# Patient Record
Sex: MALE | Race: BLACK OR AFRICAN AMERICAN | Employment: OTHER | URBAN - METROPOLITAN AREA
[De-identification: names, ages, dates, MRNs, and addresses within clinical notes are randomized per-mention and may not be internally consistent; named-entity substitution may affect disease eponyms.]

---

## 2017-02-08 ENCOUNTER — OFFICE VISIT (OUTPATIENT)
Dept: INTERNAL MEDICINE | Age: 82
End: 2017-02-08

## 2017-02-08 ENCOUNTER — TELEPHONE (OUTPATIENT)
Dept: INTERNAL MEDICINE | Age: 82
End: 2017-02-08

## 2017-02-08 VITALS
HEIGHT: 68 IN | HEART RATE: 66 BPM | SYSTOLIC BLOOD PRESSURE: 139 MMHG | BODY MASS INDEX: 47.44 KG/M2 | DIASTOLIC BLOOD PRESSURE: 75 MMHG | OXYGEN SATURATION: 90 % | WEIGHT: 313 LBS

## 2017-02-08 DIAGNOSIS — E11.22 TYPE 2 DIABETES MELLITUS WITH STAGE 3 CHRONIC KIDNEY DISEASE, WITHOUT LONG-TERM CURRENT USE OF INSULIN (HCC): Chronic | ICD-10-CM

## 2017-02-08 DIAGNOSIS — D64.9 ANEMIA, UNSPECIFIED TYPE: Chronic | ICD-10-CM

## 2017-02-08 DIAGNOSIS — M1A.9XX0 CHRONIC GOUT WITHOUT TOPHUS, UNSPECIFIED CAUSE, UNSPECIFIED SITE: Chronic | ICD-10-CM

## 2017-02-08 DIAGNOSIS — E66.01 MORBID OBESITY WITH BMI OF 45.0-49.9, ADULT (HCC): ICD-10-CM

## 2017-02-08 DIAGNOSIS — N18.30 CHRONIC KIDNEY DISEASE, STAGE III (MODERATE) (HCC): ICD-10-CM

## 2017-02-08 DIAGNOSIS — N18.30 TYPE 2 DIABETES MELLITUS WITH STAGE 3 CHRONIC KIDNEY DISEASE, WITHOUT LONG-TERM CURRENT USE OF INSULIN (HCC): Chronic | ICD-10-CM

## 2017-02-08 DIAGNOSIS — R80.9 PROTEINURIA: Chronic | ICD-10-CM

## 2017-02-08 DIAGNOSIS — R06.09 DYSPNEA ON EXERTION: Chronic | ICD-10-CM

## 2017-02-08 DIAGNOSIS — I10 ESSENTIAL HYPERTENSION: Chronic | ICD-10-CM

## 2017-02-08 DIAGNOSIS — E78.5 HYPERLIPIDEMIA, UNSPECIFIED HYPERLIPIDEMIA TYPE: Primary | Chronic | ICD-10-CM

## 2017-02-08 DIAGNOSIS — J43.9 PULMONARY EMPHYSEMA, UNSPECIFIED EMPHYSEMA TYPE (HCC): ICD-10-CM

## 2017-02-08 DIAGNOSIS — I51.89 DIASTOLIC DYSFUNCTION: Chronic | ICD-10-CM

## 2017-02-08 LAB
ALBUMIN SERPL-MCNC: 4.8 G/DL (ref 3.4–5)
ALP BLD-CCNC: 63 U/L (ref 40–129)
ALT SERPL-CCNC: 9 U/L (ref 10–40)
ANION GAP SERPL CALCULATED.3IONS-SCNC: 16 MMOL/L (ref 3–16)
AST SERPL-CCNC: 17 U/L (ref 15–37)
BASOPHILS ABSOLUTE: 0 K/UL (ref 0–0.2)
BASOPHILS RELATIVE PERCENT: 0.6 %
BILIRUB SERPL-MCNC: 0.6 MG/DL (ref 0–1)
BILIRUBIN DIRECT: <0.2 MG/DL (ref 0–0.3)
BILIRUBIN URINE: NEGATIVE
BILIRUBIN, INDIRECT: ABNORMAL MG/DL (ref 0–1)
BLOOD, URINE: NEGATIVE
BUN BLDV-MCNC: 16 MG/DL (ref 7–20)
CALCIUM SERPL-MCNC: 10.6 MG/DL (ref 8.3–10.6)
CHLORIDE BLD-SCNC: 101 MMOL/L (ref 99–110)
CLARITY: CLEAR
CO2: 29 MMOL/L (ref 21–32)
COLOR: YELLOW
CREAT SERPL-MCNC: 1.5 MG/DL (ref 0.8–1.3)
CREATININE URINE: 134.2 MG/DL (ref 39–259)
EOSINOPHILS ABSOLUTE: 0.4 K/UL (ref 0–0.6)
EOSINOPHILS RELATIVE PERCENT: 7.1 %
EPITHELIAL CELLS, UA: 3 /HPF (ref 0–5)
GFR AFRICAN AMERICAN: 54
GFR NON-AFRICAN AMERICAN: 45
GLUCOSE BLD-MCNC: 111 MG/DL (ref 70–99)
GLUCOSE URINE: NEGATIVE MG/DL
HCT VFR BLD CALC: 45.3 % (ref 40.5–52.5)
HEMOGLOBIN: 14.2 G/DL (ref 13.5–17.5)
HYALINE CASTS: 3 /HPF (ref 0–8)
KETONES, URINE: NEGATIVE MG/DL
LEUKOCYTE ESTERASE, URINE: NEGATIVE
LYMPHOCYTES ABSOLUTE: 1.5 K/UL (ref 1–5.1)
LYMPHOCYTES RELATIVE PERCENT: 29.5 %
MCH RBC QN AUTO: 30.8 PG (ref 26–34)
MCHC RBC AUTO-ENTMCNC: 31.5 G/DL (ref 31–36)
MCV RBC AUTO: 97.8 FL (ref 80–100)
MICROALBUMIN UR-MCNC: 79.1 MG/DL
MICROALBUMIN/CREAT UR-RTO: 589.4 MG/G (ref 0–30)
MICROSCOPIC EXAMINATION: YES
MONOCYTES ABSOLUTE: 0.5 K/UL (ref 0–1.3)
MONOCYTES RELATIVE PERCENT: 8.7 %
NEUTROPHILS ABSOLUTE: 2.8 K/UL (ref 1.7–7.7)
NEUTROPHILS RELATIVE PERCENT: 54.1 %
NITRITE, URINE: NEGATIVE
PDW BLD-RTO: 16.1 % (ref 12.4–15.4)
PH UA: 7
PHOSPHORUS: 2.8 MG/DL (ref 2.5–4.9)
PLATELET # BLD: 141 K/UL (ref 135–450)
PMV BLD AUTO: 10 FL (ref 5–10.5)
POTASSIUM SERPL-SCNC: 4.4 MMOL/L (ref 3.5–5.1)
PROTEIN UA: 100 MG/DL
RBC # BLD: 4.63 M/UL (ref 4.2–5.9)
RBC UA: 5 /HPF (ref 0–4)
SODIUM BLD-SCNC: 146 MMOL/L (ref 136–145)
SPECIFIC GRAVITY UA: 1.02
TOTAL PROTEIN: 7.8 G/DL (ref 6.4–8.2)
TSH SERPL DL<=0.05 MIU/L-ACNC: 4.36 UIU/ML (ref 0.27–4.2)
URIC ACID, SERUM: 5.9 MG/DL (ref 3.5–7.2)
URINE TYPE: ABNORMAL
UROBILINOGEN, URINE: 1 E.U./DL
WBC # BLD: 5.2 K/UL (ref 4–11)
WBC UA: 3 /HPF (ref 0–5)

## 2017-02-08 PROCEDURE — 99214 OFFICE O/P EST MOD 30 MIN: CPT | Performed by: INTERNAL MEDICINE

## 2017-02-08 RX ORDER — BUDESONIDE AND FORMOTEROL FUMARATE DIHYDRATE 80; 4.5 UG/1; UG/1
2 AEROSOL RESPIRATORY (INHALATION) 2 TIMES DAILY
Qty: 1 INHALER | Refills: 12 | Status: SHIPPED | OUTPATIENT
Start: 2017-02-08 | End: 2017-05-09

## 2017-02-08 ASSESSMENT — ENCOUNTER SYMPTOMS
APNEA: 0
WHEEZING: 0
STRIDOR: 0
EYES NEGATIVE: 1
SHORTNESS OF BREATH: 1
COUGH: 0
GASTROINTESTINAL NEGATIVE: 1
CHOKING: 0
CHEST TIGHTNESS: 0

## 2017-02-09 LAB
ESTIMATED AVERAGE GLUCOSE: 134.1 MG/DL
HBA1C MFR BLD: 6.3 %

## 2017-02-14 RX ORDER — LEVOTHYROXINE SODIUM 0.03 MG/1
25 TABLET ORAL DAILY
Qty: 30 TABLET | Refills: 2 | Status: SHIPPED | OUTPATIENT
Start: 2017-02-14 | End: 2017-05-09 | Stop reason: SDUPTHER

## 2017-05-09 ENCOUNTER — OFFICE VISIT (OUTPATIENT)
Dept: INTERNAL MEDICINE | Age: 82
End: 2017-05-09

## 2017-05-09 VITALS
OXYGEN SATURATION: 93 % | WEIGHT: 315 LBS | HEART RATE: 76 BPM | SYSTOLIC BLOOD PRESSURE: 154 MMHG | DIASTOLIC BLOOD PRESSURE: 86 MMHG | BODY MASS INDEX: 48.96 KG/M2

## 2017-05-09 DIAGNOSIS — N18.30 TYPE 2 DIABETES MELLITUS WITH STAGE 3 CHRONIC KIDNEY DISEASE, WITHOUT LONG-TERM CURRENT USE OF INSULIN (HCC): Chronic | ICD-10-CM

## 2017-05-09 DIAGNOSIS — E04.2 MULTIPLE THYROID NODULES: Chronic | ICD-10-CM

## 2017-05-09 DIAGNOSIS — E11.22 TYPE 2 DIABETES MELLITUS WITH STAGE 3 CHRONIC KIDNEY DISEASE, WITHOUT LONG-TERM CURRENT USE OF INSULIN (HCC): Chronic | ICD-10-CM

## 2017-05-09 DIAGNOSIS — I10 ESSENTIAL HYPERTENSION: Primary | Chronic | ICD-10-CM

## 2017-05-09 DIAGNOSIS — R60.0 LOCALIZED EDEMA: Chronic | ICD-10-CM

## 2017-05-09 DIAGNOSIS — N18.30 CHRONIC KIDNEY DISEASE, STAGE III (MODERATE) (HCC): Chronic | ICD-10-CM

## 2017-05-09 DIAGNOSIS — J43.9 PULMONARY EMPHYSEMA, UNSPECIFIED EMPHYSEMA TYPE (HCC): ICD-10-CM

## 2017-05-09 PROCEDURE — 99214 OFFICE O/P EST MOD 30 MIN: CPT | Performed by: INTERNAL MEDICINE

## 2017-05-09 RX ORDER — TORSEMIDE 100 MG/1
100 TABLET ORAL DAILY
Qty: 90 TABLET | Refills: 3 | Status: SHIPPED | OUTPATIENT
Start: 2017-05-09 | End: 2017-07-06 | Stop reason: SDUPTHER

## 2017-05-09 RX ORDER — LISINOPRIL 40 MG/1
40 TABLET ORAL DAILY
Qty: 90 TABLET | Refills: 3 | Status: ON HOLD | OUTPATIENT
Start: 2017-05-09 | End: 2017-08-13 | Stop reason: HOSPADM

## 2017-05-09 RX ORDER — CARVEDILOL 25 MG/1
25 TABLET ORAL 2 TIMES DAILY
Qty: 180 TABLET | Refills: 3 | Status: SHIPPED | OUTPATIENT
Start: 2017-05-09 | End: 2017-11-01 | Stop reason: SDUPTHER

## 2017-05-09 RX ORDER — LEVOTHYROXINE SODIUM 0.03 MG/1
25 TABLET ORAL DAILY
Qty: 90 TABLET | Refills: 3 | Status: SHIPPED | OUTPATIENT
Start: 2017-05-09 | End: 2017-11-01 | Stop reason: SDUPTHER

## 2017-05-09 ASSESSMENT — ENCOUNTER SYMPTOMS
APNEA: 0
EYES NEGATIVE: 1
CHEST TIGHTNESS: 0
CHOKING: 0
SHORTNESS OF BREATH: 1
COUGH: 0
BLURRED VISION: 0
STRIDOR: 0
GASTROINTESTINAL NEGATIVE: 1
WHEEZING: 0

## 2017-05-09 ASSESSMENT — PATIENT HEALTH QUESTIONNAIRE - PHQ9
SUM OF ALL RESPONSES TO PHQ QUESTIONS 1-9: 0
2. FEELING DOWN, DEPRESSED OR HOPELESS: 0
SUM OF ALL RESPONSES TO PHQ9 QUESTIONS 1 & 2: 0
1. LITTLE INTEREST OR PLEASURE IN DOING THINGS: 0

## 2017-05-10 LAB
ALBUMIN SERPL-MCNC: 4.5 G/DL (ref 3.4–5)
ANION GAP SERPL CALCULATED.3IONS-SCNC: 15 MMOL/L (ref 3–16)
BUN BLDV-MCNC: 13 MG/DL (ref 7–20)
CALCIUM SERPL-MCNC: 10.3 MG/DL (ref 8.3–10.6)
CHLORIDE BLD-SCNC: 101 MMOL/L (ref 99–110)
CO2: 29 MMOL/L (ref 21–32)
CREAT SERPL-MCNC: 1.4 MG/DL (ref 0.8–1.3)
ESTIMATED AVERAGE GLUCOSE: 139.9 MG/DL
GFR AFRICAN AMERICAN: 59
GFR NON-AFRICAN AMERICAN: 48
GLUCOSE BLD-MCNC: 111 MG/DL (ref 70–99)
HBA1C MFR BLD: 6.5 %
PHOSPHORUS: 2.6 MG/DL (ref 2.5–4.9)
POTASSIUM SERPL-SCNC: 4.5 MMOL/L (ref 3.5–5.1)
SODIUM BLD-SCNC: 145 MMOL/L (ref 136–145)

## 2017-06-06 ENCOUNTER — OFFICE VISIT (OUTPATIENT)
Dept: INTERNAL MEDICINE | Age: 82
End: 2017-06-06

## 2017-06-06 VITALS
BODY MASS INDEX: 47.44 KG/M2 | SYSTOLIC BLOOD PRESSURE: 130 MMHG | WEIGHT: 313 LBS | HEIGHT: 68 IN | OXYGEN SATURATION: 93 % | DIASTOLIC BLOOD PRESSURE: 74 MMHG | HEART RATE: 63 BPM

## 2017-06-06 DIAGNOSIS — R60.0 LOCALIZED EDEMA: Primary | Chronic | ICD-10-CM

## 2017-06-06 DIAGNOSIS — N18.30 CHRONIC KIDNEY DISEASE, STAGE III (MODERATE) (HCC): Chronic | ICD-10-CM

## 2017-06-06 DIAGNOSIS — L03.119 CELLULITIS OF LOWER EXTREMITY, UNSPECIFIED LATERALITY: ICD-10-CM

## 2017-06-06 PROCEDURE — 99214 OFFICE O/P EST MOD 30 MIN: CPT | Performed by: INTERNAL MEDICINE

## 2017-06-06 RX ORDER — CIPROFLOXACIN 250 MG/1
250 TABLET, FILM COATED ORAL 2 TIMES DAILY
Qty: 20 TABLET | Refills: 0 | Status: SHIPPED | OUTPATIENT
Start: 2017-06-06 | End: 2017-07-06 | Stop reason: ALTCHOICE

## 2017-06-06 ASSESSMENT — ENCOUNTER SYMPTOMS
RESPIRATORY NEGATIVE: 1
GASTROINTESTINAL NEGATIVE: 1
EYES NEGATIVE: 1

## 2017-06-07 LAB
ALBUMIN SERPL-MCNC: 4.5 G/DL (ref 3.4–5)
ANION GAP SERPL CALCULATED.3IONS-SCNC: 15 MMOL/L (ref 3–16)
BUN BLDV-MCNC: 21 MG/DL (ref 7–20)
CALCIUM SERPL-MCNC: 10.4 MG/DL (ref 8.3–10.6)
CHLORIDE BLD-SCNC: 97 MMOL/L (ref 99–110)
CO2: 31 MMOL/L (ref 21–32)
CREAT SERPL-MCNC: 1.8 MG/DL (ref 0.8–1.3)
GFR AFRICAN AMERICAN: 44
GFR NON-AFRICAN AMERICAN: 36
GLUCOSE BLD-MCNC: 94 MG/DL (ref 70–99)
PHOSPHORUS: 3 MG/DL (ref 2.5–4.9)
POTASSIUM SERPL-SCNC: 4.3 MMOL/L (ref 3.5–5.1)
SODIUM BLD-SCNC: 143 MMOL/L (ref 136–145)

## 2017-06-11 ASSESSMENT — ENCOUNTER SYMPTOMS: BLURRED VISION: 0

## 2017-07-06 ENCOUNTER — OFFICE VISIT (OUTPATIENT)
Dept: INTERNAL MEDICINE | Age: 82
End: 2017-07-06

## 2017-07-06 VITALS
SYSTOLIC BLOOD PRESSURE: 136 MMHG | DIASTOLIC BLOOD PRESSURE: 67 MMHG | WEIGHT: 312 LBS | HEIGHT: 68 IN | HEART RATE: 66 BPM | OXYGEN SATURATION: 93 % | BODY MASS INDEX: 47.29 KG/M2

## 2017-07-06 DIAGNOSIS — N18.30 TYPE 2 DIABETES MELLITUS WITH STAGE 3 CHRONIC KIDNEY DISEASE, WITHOUT LONG-TERM CURRENT USE OF INSULIN (HCC): Primary | Chronic | ICD-10-CM

## 2017-07-06 DIAGNOSIS — N18.30 CHRONIC KIDNEY DISEASE, STAGE III (MODERATE) (HCC): Chronic | ICD-10-CM

## 2017-07-06 DIAGNOSIS — R60.0 LOCALIZED EDEMA: Chronic | ICD-10-CM

## 2017-07-06 DIAGNOSIS — E11.22 TYPE 2 DIABETES MELLITUS WITH STAGE 3 CHRONIC KIDNEY DISEASE, WITHOUT LONG-TERM CURRENT USE OF INSULIN (HCC): Primary | Chronic | ICD-10-CM

## 2017-07-06 DIAGNOSIS — J43.9 PULMONARY EMPHYSEMA, UNSPECIFIED EMPHYSEMA TYPE (HCC): ICD-10-CM

## 2017-07-06 PROCEDURE — 99214 OFFICE O/P EST MOD 30 MIN: CPT | Performed by: INTERNAL MEDICINE

## 2017-07-06 RX ORDER — ALBUTEROL SULFATE 90 UG/1
2 AEROSOL, METERED RESPIRATORY (INHALATION) EVERY 6 HOURS PRN
Qty: 1 INHALER | Refills: 12 | Status: SHIPPED | OUTPATIENT
Start: 2017-07-06 | End: 2022-07-11 | Stop reason: CLARIF

## 2017-07-06 RX ORDER — TORSEMIDE 100 MG/1
100 TABLET ORAL 2 TIMES DAILY
Qty: 180 TABLET | Refills: 3 | Status: ON HOLD | OUTPATIENT
Start: 2017-07-06 | End: 2017-08-13

## 2017-07-06 RX ORDER — CEPHALEXIN 500 MG/1
500 CAPSULE ORAL 3 TIMES DAILY
Qty: 30 CAPSULE | Refills: 0 | Status: ON HOLD | OUTPATIENT
Start: 2017-07-06 | End: 2017-08-08 | Stop reason: CLARIF

## 2017-07-07 ENCOUNTER — TELEPHONE (OUTPATIENT)
Dept: INTERNAL MEDICINE | Age: 82
End: 2017-07-07

## 2017-07-10 ASSESSMENT — ENCOUNTER SYMPTOMS
APNEA: 0
EYES NEGATIVE: 1
COUGH: 0
WHEEZING: 0
CHEST TIGHTNESS: 0
SHORTNESS OF BREATH: 1
GASTROINTESTINAL NEGATIVE: 1
STRIDOR: 0
BLURRED VISION: 0
CHOKING: 0

## 2017-07-27 ENCOUNTER — TELEPHONE (OUTPATIENT)
Dept: INTERNAL MEDICINE | Age: 82
End: 2017-07-27

## 2017-08-08 PROBLEM — R60.1 ANASARCA: Status: ACTIVE | Noted: 2017-08-08

## 2017-08-09 PROBLEM — I87.2 VENOUS STASIS DERMATITIS OF BOTH LOWER EXTREMITIES: Status: ACTIVE | Noted: 2017-08-09

## 2017-08-14 ENCOUNTER — TELEPHONE (OUTPATIENT)
Dept: PHARMACY | Facility: CLINIC | Age: 82
End: 2017-08-14

## 2017-08-14 ENCOUNTER — CARE COORDINATION (OUTPATIENT)
Dept: CASE MANAGEMENT | Age: 82
End: 2017-08-14

## 2017-08-15 ENCOUNTER — CARE COORDINATION (OUTPATIENT)
Dept: CASE MANAGEMENT | Age: 82
End: 2017-08-15

## 2017-08-17 ENCOUNTER — TELEPHONE (OUTPATIENT)
Dept: INTERNAL MEDICINE | Age: 82
End: 2017-08-17

## 2017-08-18 ENCOUNTER — CARE COORDINATION (OUTPATIENT)
Dept: CASE MANAGEMENT | Age: 82
End: 2017-08-18

## 2017-08-21 ENCOUNTER — OFFICE VISIT (OUTPATIENT)
Dept: INTERNAL MEDICINE | Age: 82
End: 2017-08-21

## 2017-08-21 VITALS
HEART RATE: 78 BPM | OXYGEN SATURATION: 98 % | BODY MASS INDEX: 44.57 KG/M2 | HEIGHT: 67 IN | WEIGHT: 284 LBS | SYSTOLIC BLOOD PRESSURE: 120 MMHG | DIASTOLIC BLOOD PRESSURE: 66 MMHG

## 2017-08-21 DIAGNOSIS — R76.8 ANTINEUTROPHIL CYTOPLASMIC ANTIBODY (ANCA) POSITIVE: Chronic | ICD-10-CM

## 2017-08-21 DIAGNOSIS — E11.69 DIABETES MELLITUS TYPE 2 IN OBESE (HCC): Chronic | ICD-10-CM

## 2017-08-21 DIAGNOSIS — N18.30 CHRONIC KIDNEY DISEASE, STAGE III (MODERATE) (HCC): Chronic | ICD-10-CM

## 2017-08-21 DIAGNOSIS — D64.9 ANEMIA, UNSPECIFIED TYPE: Chronic | ICD-10-CM

## 2017-08-21 DIAGNOSIS — N18.30 ACUTE RENAL FAILURE SUPERIMPOSED ON STAGE 3 CHRONIC KIDNEY DISEASE (HCC): ICD-10-CM

## 2017-08-21 DIAGNOSIS — I87.2 VENOUS STASIS DERMATITIS OF BOTH LOWER EXTREMITIES: ICD-10-CM

## 2017-08-21 DIAGNOSIS — J43.9 PULMONARY EMPHYSEMA, UNSPECIFIED EMPHYSEMA TYPE (HCC): Chronic | ICD-10-CM

## 2017-08-21 DIAGNOSIS — N17.9 ACUTE RENAL FAILURE SUPERIMPOSED ON STAGE 3 CHRONIC KIDNEY DISEASE (HCC): ICD-10-CM

## 2017-08-21 DIAGNOSIS — E78.5 HYPERLIPIDEMIA, UNSPECIFIED HYPERLIPIDEMIA TYPE: Chronic | ICD-10-CM

## 2017-08-21 DIAGNOSIS — R60.1 ANASARCA: Primary | ICD-10-CM

## 2017-08-21 DIAGNOSIS — E55.9 VITAMIN D DEFICIENCY: Chronic | ICD-10-CM

## 2017-08-21 DIAGNOSIS — N18.4 CHRONIC KIDNEY DISEASE (CKD), STAGE IV (SEVERE) (HCC): ICD-10-CM

## 2017-08-21 DIAGNOSIS — I10 ESSENTIAL HYPERTENSION: Chronic | ICD-10-CM

## 2017-08-21 DIAGNOSIS — I50.33 ACUTE ON CHRONIC DIASTOLIC HEART FAILURE (HCC): ICD-10-CM

## 2017-08-21 DIAGNOSIS — Z23 NEED FOR INFLUENZA VACCINATION: ICD-10-CM

## 2017-08-21 DIAGNOSIS — M1A.9XX0 CHRONIC GOUT WITHOUT TOPHUS, UNSPECIFIED CAUSE, UNSPECIFIED SITE: Chronic | ICD-10-CM

## 2017-08-21 DIAGNOSIS — E66.9 DIABETES MELLITUS TYPE 2 IN OBESE (HCC): Chronic | ICD-10-CM

## 2017-08-21 LAB
ALBUMIN SERPL-MCNC: 5 G/DL (ref 3.4–5)
ALP BLD-CCNC: 64 U/L (ref 40–129)
ALT SERPL-CCNC: 11 U/L (ref 10–40)
ANION GAP SERPL CALCULATED.3IONS-SCNC: 24 MMOL/L (ref 3–16)
AST SERPL-CCNC: 15 U/L (ref 15–37)
BASOPHILS ABSOLUTE: 0 K/UL (ref 0–0.2)
BASOPHILS RELATIVE PERCENT: 0.5 %
BILIRUB SERPL-MCNC: 0.6 MG/DL (ref 0–1)
BILIRUBIN DIRECT: <0.2 MG/DL (ref 0–0.3)
BILIRUBIN URINE: NEGATIVE
BILIRUBIN, INDIRECT: ABNORMAL MG/DL (ref 0–1)
BLOOD, URINE: ABNORMAL
BUN BLDV-MCNC: 65 MG/DL (ref 7–20)
CALCIUM SERPL-MCNC: 11.2 MG/DL (ref 8.3–10.6)
CHLORIDE BLD-SCNC: 93 MMOL/L (ref 99–110)
CHOLESTEROL, TOTAL: 149 MG/DL (ref 0–199)
CLARITY: CLEAR
CO2: 25 MMOL/L (ref 21–32)
COLOR: YELLOW
CREAT SERPL-MCNC: 2.9 MG/DL (ref 0.8–1.3)
CREATININE URINE: 108.1 MG/DL (ref 39–259)
EOSINOPHILS ABSOLUTE: 0.3 K/UL (ref 0–0.6)
EOSINOPHILS RELATIVE PERCENT: 3.5 %
EPITHELIAL CELLS, UA: 3 /HPF (ref 0–5)
GFR AFRICAN AMERICAN: 25
GFR NON-AFRICAN AMERICAN: 21
GLUCOSE BLD-MCNC: 121 MG/DL (ref 70–99)
GLUCOSE URINE: NEGATIVE MG/DL
HCT VFR BLD CALC: 45.4 % (ref 40.5–52.5)
HDLC SERPL-MCNC: 42 MG/DL (ref 40–60)
HEMOGLOBIN: 14.7 G/DL (ref 13.5–17.5)
HYALINE CASTS: 1 /LPF (ref 0–8)
KETONES, URINE: NEGATIVE MG/DL
LDL CHOLESTEROL CALCULATED: 89 MG/DL
LEUKOCYTE ESTERASE, URINE: ABNORMAL
LYMPHOCYTES ABSOLUTE: 2 K/UL (ref 1–5.1)
LYMPHOCYTES RELATIVE PERCENT: 27.3 %
MCH RBC QN AUTO: 30.8 PG (ref 26–34)
MCHC RBC AUTO-ENTMCNC: 32.5 G/DL (ref 31–36)
MCV RBC AUTO: 94.6 FL (ref 80–100)
MICROALBUMIN UR-MCNC: 7.8 MG/DL
MICROALBUMIN/CREAT UR-RTO: 72.2 MG/G (ref 0–30)
MICROSCOPIC EXAMINATION: YES
MONOCYTES ABSOLUTE: 0.7 K/UL (ref 0–1.3)
MONOCYTES RELATIVE PERCENT: 10.3 %
NEUTROPHILS ABSOLUTE: 4.2 K/UL (ref 1.7–7.7)
NEUTROPHILS RELATIVE PERCENT: 58.4 %
NITRITE, URINE: NEGATIVE
PDW BLD-RTO: 15.1 % (ref 12.4–15.4)
PH UA: 6
PHOSPHORUS: 2.9 MG/DL (ref 2.5–4.9)
PLATELET # BLD: 177 K/UL (ref 135–450)
PMV BLD AUTO: 11.1 FL (ref 5–10.5)
POTASSIUM SERPL-SCNC: 4.4 MMOL/L (ref 3.5–5.1)
PROTEIN UA: ABNORMAL MG/DL
RBC # BLD: 4.79 M/UL (ref 4.2–5.9)
RBC UA: 4 /HPF (ref 0–4)
SODIUM BLD-SCNC: 142 MMOL/L (ref 136–145)
SPECIFIC GRAVITY UA: 1.01
TOTAL PROTEIN: 8.7 G/DL (ref 6.4–8.2)
TRIGL SERPL-MCNC: 91 MG/DL (ref 0–150)
URIC ACID, SERUM: 6.6 MG/DL (ref 3.5–7.2)
URINE TYPE: ABNORMAL
UROBILINOGEN, URINE: 0.2 E.U./DL
VLDLC SERPL CALC-MCNC: 18 MG/DL
WBC # BLD: 7.2 K/UL (ref 4–11)
WBC UA: 18 /HPF (ref 0–5)

## 2017-08-21 PROCEDURE — 99495 TRANSJ CARE MGMT MOD F2F 14D: CPT | Performed by: INTERNAL MEDICINE

## 2017-08-21 PROCEDURE — 90662 IIV NO PRSV INCREASED AG IM: CPT | Performed by: INTERNAL MEDICINE

## 2017-08-21 PROCEDURE — G0008 ADMIN INFLUENZA VIRUS VAC: HCPCS | Performed by: INTERNAL MEDICINE

## 2017-08-21 ASSESSMENT — ENCOUNTER SYMPTOMS
RESPIRATORY NEGATIVE: 1
EYES NEGATIVE: 1
GASTROINTESTINAL NEGATIVE: 1
FACIAL SWELLING: 0
VOICE CHANGE: 0
SORE THROAT: 0
RHINORRHEA: 0
TROUBLE SWALLOWING: 0
SINUS PRESSURE: 0

## 2017-08-22 LAB
ESTIMATED AVERAGE GLUCOSE: 165.7 MG/DL
HBA1C MFR BLD: 7.4 %
TSH SERPL DL<=0.05 MIU/L-ACNC: 4.18 UIU/ML (ref 0.27–4.2)
VITAMIN D 25-HYDROXY: 28.1 NG/ML

## 2017-08-23 ENCOUNTER — CARE COORDINATION (OUTPATIENT)
Dept: CASE MANAGEMENT | Age: 82
End: 2017-08-23

## 2017-08-24 PROBLEM — E83.52 HYPERCALCEMIA: Chronic | Status: ACTIVE | Noted: 2017-08-24

## 2017-08-24 RX ORDER — LEVOFLOXACIN 250 MG/1
250 TABLET ORAL DAILY
Qty: 3 TABLET | Refills: 0 | OUTPATIENT
Start: 2017-08-24 | End: 2017-08-27

## 2017-08-29 ENCOUNTER — CARE COORDINATION (OUTPATIENT)
Dept: CASE MANAGEMENT | Age: 82
End: 2017-08-29

## 2017-10-12 ENCOUNTER — OFFICE VISIT (OUTPATIENT)
Dept: INTERNAL MEDICINE | Age: 82
End: 2017-10-12

## 2017-10-12 VITALS
OXYGEN SATURATION: 98 % | HEART RATE: 60 BPM | WEIGHT: 275 LBS | DIASTOLIC BLOOD PRESSURE: 74 MMHG | BODY MASS INDEX: 44.2 KG/M2 | SYSTOLIC BLOOD PRESSURE: 150 MMHG | HEIGHT: 66 IN

## 2017-10-12 DIAGNOSIS — E78.5 HYPERLIPIDEMIA, UNSPECIFIED HYPERLIPIDEMIA TYPE: Chronic | ICD-10-CM

## 2017-10-12 DIAGNOSIS — E11.69 DIABETES MELLITUS TYPE 2 IN OBESE (HCC): Chronic | ICD-10-CM

## 2017-10-12 DIAGNOSIS — I51.89 DIASTOLIC DYSFUNCTION: Chronic | ICD-10-CM

## 2017-10-12 DIAGNOSIS — I10 ESSENTIAL HYPERTENSION: Chronic | ICD-10-CM

## 2017-10-12 DIAGNOSIS — N18.30 CHRONIC KIDNEY DISEASE, STAGE III (MODERATE) (HCC): Primary | Chronic | ICD-10-CM

## 2017-10-12 DIAGNOSIS — E66.9 DIABETES MELLITUS TYPE 2 IN OBESE (HCC): Chronic | ICD-10-CM

## 2017-10-12 PROCEDURE — 99213 OFFICE O/P EST LOW 20 MIN: CPT | Performed by: INTERNAL MEDICINE

## 2017-10-12 ASSESSMENT — ENCOUNTER SYMPTOMS
VOMITING: 0
ABDOMINAL PAIN: 0
CHEST TIGHTNESS: 0
SHORTNESS OF BREATH: 0
NAUSEA: 0

## 2017-10-12 NOTE — PROGRESS NOTES
MD   aspirin EC 81 MG EC tablet Take 1 tablet by mouth daily. With food. 2/6/15  Yes Santa Becerra MD   Multiple Vitamin (MULTIVITAMIN PO) Take 1 tablet by mouth daily. 5/24/10  Yes Santa Becerra MD       REVIEW OF SYSTEMS:  Review of Systems   Constitutional: Negative for chills and fever. Respiratory: Negative for chest tightness and shortness of breath. Cardiovascular: Negative for chest pain. Gastrointestinal: Negative for abdominal pain, nausea and vomiting. Neurological: Negative for weakness, numbness and headaches. Physical Exam:      Vitals: BP (!) 150/74   Pulse 60   Ht 5' 6\" (1.676 m)   Wt 275 lb (124.7 kg)   SpO2 98%   BMI 44.39 kg/m²     Body mass index is 44.39 kg/m². Wt Readings from Last 3 Encounters:   10/12/17 275 lb (124.7 kg)   08/21/17 284 lb (128.8 kg)   08/13/17 298 lb 4.5 oz (135.3 kg)     Physical Exam   Constitutional: He is oriented to person, place, and time. He appears well-developed and well-nourished. No distress. HENT:   Head: Normocephalic and atraumatic. Right Ear: External ear normal.   Left Ear: External ear normal.   Mouth/Throat: Oropharynx is clear and moist. No oropharyngeal exudate. Eyes: Conjunctivae and EOM are normal. Pupils are equal, round, and reactive to light. Right eye exhibits no discharge. Left eye exhibits no discharge. No scleral icterus. Right eye exhibits normal extraocular motion and no nystagmus. Left eye exhibits normal extraocular motion and no nystagmus. Right pupil is round and reactive. Left pupil is round and reactive. Neck: Normal range of motion. Neck supple. Cardiovascular: Normal rate and normal heart sounds. No murmur heard. Pulmonary/Chest: Effort normal and breath sounds normal. No respiratory distress. He has no wheezes. He has no rales. Abdominal: Soft. There is no tenderness. Musculoskeletal: He exhibits edema. Lymphadenopathy:     He has no cervical adenopathy.    Neurological: He is alert and oriented to person, place, and time. He has normal strength and normal reflexes. He displays no tremor. No cranial nerve deficit or sensory deficit. He exhibits normal muscle tone. GCS eye subscore is 4. GCS verbal subscore is 5. GCS motor subscore is 6. Normal CN II-XII   Skin: No rash noted. He is not diaphoretic. Psychiatric: He has a normal mood and affect. His behavior is normal. Judgment and thought content normal.        Assessment/Plan:   Nathen Perry was seen today for diabetes and hypertension. Diagnoses and all orders for this visit:    Chronic kidney disease, stage III (moderate)  He is followed by a nephrologist of the soft are clinically there is no worsening but I will repeat all lab tests and look for results. Essential hypertension  Pressure relatively controlledagain he is followed by a nephrologist which manage the blood pressure with results of blood tests for further workup    Diabetes mellitus type 2 in obese ([address]  Diabetes was not known to him last A1c 7.4 He can not be treated with metformin for his GFR. I  will wait for the current kidney function to decide on further management for the diabetes. -     HEMOGLOBIN A1C; Future    Hyperlipidemia, unspecified hyperlipidemia type  Keep  simvastatin  Diastolic dysfunction      Patient was encouraged to call the office or be seen with MD for any worsening or lack of improvement.      Ricarda Kwon M.D.   10/12/2017, 6:09 PM

## 2017-10-27 DIAGNOSIS — I10 ESSENTIAL HYPERTENSION: Primary | Chronic | ICD-10-CM

## 2017-10-30 DIAGNOSIS — I10 ESSENTIAL HYPERTENSION: Chronic | ICD-10-CM

## 2017-10-30 LAB
ANION GAP SERPL CALCULATED.3IONS-SCNC: 19 MMOL/L (ref 3–16)
BUN BLDV-MCNC: 41 MG/DL (ref 7–20)
CALCIUM SERPL-MCNC: 10.9 MG/DL (ref 8.3–10.6)
CHLORIDE BLD-SCNC: 97 MMOL/L (ref 99–110)
CO2: 25 MMOL/L (ref 21–32)
CREAT SERPL-MCNC: 2.5 MG/DL (ref 0.8–1.3)
GFR AFRICAN AMERICAN: 30
GFR NON-AFRICAN AMERICAN: 25
GLUCOSE BLD-MCNC: 111 MG/DL (ref 70–99)
POTASSIUM SERPL-SCNC: 4.7 MMOL/L (ref 3.5–5.1)
SODIUM BLD-SCNC: 141 MMOL/L (ref 136–145)

## 2017-11-01 ENCOUNTER — TELEPHONE (OUTPATIENT)
Dept: INTERNAL MEDICINE | Age: 82
End: 2017-11-01

## 2017-11-01 DIAGNOSIS — E04.2 MULTIPLE THYROID NODULES: Chronic | ICD-10-CM

## 2017-11-01 DIAGNOSIS — K21.9 GASTROESOPHAGEAL REFLUX DISEASE WITHOUT ESOPHAGITIS: Chronic | ICD-10-CM

## 2017-11-01 DIAGNOSIS — N40.1 BENIGN NON-NODULAR PROSTATIC HYPERPLASIA WITH LOWER URINARY TRACT SYMPTOMS: Chronic | ICD-10-CM

## 2017-11-01 DIAGNOSIS — I10 ESSENTIAL HYPERTENSION: Chronic | ICD-10-CM

## 2017-11-01 RX ORDER — FAMOTIDINE 20 MG/1
20 TABLET, FILM COATED ORAL 2 TIMES DAILY
Qty: 180 TABLET | Refills: 3 | Status: SHIPPED | OUTPATIENT
Start: 2017-11-01 | End: 2018-10-16 | Stop reason: SDUPTHER

## 2017-11-01 RX ORDER — TAMSULOSIN HYDROCHLORIDE 0.4 MG/1
0.4 CAPSULE ORAL NIGHTLY
Qty: 90 CAPSULE | Refills: 3 | Status: SHIPPED | OUTPATIENT
Start: 2017-11-01 | End: 2018-10-16 | Stop reason: SDUPTHER

## 2017-11-01 RX ORDER — CARVEDILOL 25 MG/1
25 TABLET ORAL 2 TIMES DAILY
Qty: 180 TABLET | Refills: 3 | Status: SHIPPED | OUTPATIENT
Start: 2017-11-01 | End: 2018-11-27 | Stop reason: SDUPTHER

## 2017-11-01 RX ORDER — SIMVASTATIN 20 MG
20 TABLET ORAL NIGHTLY
Qty: 90 TABLET | Refills: 3 | Status: SHIPPED | OUTPATIENT
Start: 2017-11-01 | End: 2018-10-16 | Stop reason: SDUPTHER

## 2017-11-01 RX ORDER — LEVOTHYROXINE SODIUM 0.03 MG/1
25 TABLET ORAL DAILY
Qty: 90 TABLET | Refills: 3 | Status: SHIPPED | OUTPATIENT
Start: 2017-11-01 | End: 2019-01-16 | Stop reason: SDUPTHER

## 2017-11-01 NOTE — TELEPHONE ENCOUNTER
Pt needs refill on the following:  carvedilol (COREG) 25 MG tablet   simvastatin (ZOCOR) 20 MG tablet [[id number]  famotidine (PEPCID) 20 MG tablet [[id number]  tamsulosin (FLOMAX) 0.4 MG capsule [[id number]    levothyroxine (LEVOTHROID) 25 MCG tablet [[id number]  To pharmacy on file

## 2017-11-15 ENCOUNTER — TELEPHONE (OUTPATIENT)
Dept: INTERNAL MEDICINE | Age: 82
End: 2017-11-15

## 2017-11-15 DIAGNOSIS — M1A.9XX0 CHRONIC GOUT WITHOUT TOPHUS, UNSPECIFIED CAUSE, UNSPECIFIED SITE: Chronic | ICD-10-CM

## 2017-11-15 RX ORDER — ALLOPURINOL 100 MG/1
200 TABLET ORAL DAILY
Qty: 60 TABLET | Refills: 2 | Status: SHIPPED | OUTPATIENT
Start: 2017-11-15 | End: 2017-12-20 | Stop reason: SDUPTHER

## 2017-11-15 NOTE — TELEPHONE ENCOUNTER
Patient needs a refill on allopurinol (ZYLOPRIM) 100 MG tablet      . They need a 30 day supply.      Mail order or local pharmacy: local    Pharmacy: [facility] on file

## 2017-12-18 ENCOUNTER — TELEPHONE (OUTPATIENT)
Dept: INTERNAL MEDICINE | Age: 82
End: 2017-12-18

## 2017-12-20 ENCOUNTER — TELEPHONE (OUTPATIENT)
Dept: INTERNAL MEDICINE | Age: 82
End: 2017-12-20

## 2017-12-20 DIAGNOSIS — M1A.9XX0 CHRONIC GOUT WITHOUT TOPHUS, UNSPECIFIED CAUSE, UNSPECIFIED SITE: Chronic | ICD-10-CM

## 2017-12-20 RX ORDER — ALLOPURINOL 100 MG/1
200 TABLET ORAL DAILY
Qty: 60 TABLET | Refills: 5 | Status: SHIPPED | OUTPATIENT
Start: 2017-12-20 | End: 2018-07-16 | Stop reason: SDUPTHER

## 2017-12-20 NOTE — TELEPHONE ENCOUNTER
Patient needs a refill on allopurinol (ZYLOPRIM) 100 MG tablet     . They need a 60 day supply.      Mail order or local pharmacy: local    [facility]     Patient  or mail to patient(If mail order):    Pt is out of medication   Please call pt when completed

## 2018-01-15 ENCOUNTER — CARE COORDINATOR VISIT (OUTPATIENT)
Dept: CARE COORDINATION | Age: 83
End: 2018-01-15

## 2018-01-15 ENCOUNTER — OFFICE VISIT (OUTPATIENT)
Dept: INTERNAL MEDICINE | Age: 83
End: 2018-01-15

## 2018-01-15 VITALS
OXYGEN SATURATION: 92 % | BODY MASS INDEX: 44.22 KG/M2 | HEART RATE: 75 BPM | DIASTOLIC BLOOD PRESSURE: 78 MMHG | SYSTOLIC BLOOD PRESSURE: 152 MMHG | WEIGHT: 274 LBS

## 2018-01-15 DIAGNOSIS — E66.9 DIABETES MELLITUS TYPE 2 IN OBESE (HCC): Chronic | ICD-10-CM

## 2018-01-15 DIAGNOSIS — E78.5 HYPERLIPIDEMIA, UNSPECIFIED HYPERLIPIDEMIA TYPE: Chronic | ICD-10-CM

## 2018-01-15 DIAGNOSIS — N18.30 CHRONIC KIDNEY DISEASE, STAGE III (MODERATE) (HCC): Chronic | ICD-10-CM

## 2018-01-15 DIAGNOSIS — E11.69 DIABETES MELLITUS TYPE 2 IN OBESE (HCC): Primary | Chronic | ICD-10-CM

## 2018-01-15 DIAGNOSIS — E11.69 DIABETES MELLITUS TYPE 2 IN OBESE (HCC): Chronic | ICD-10-CM

## 2018-01-15 DIAGNOSIS — I10 ESSENTIAL HYPERTENSION: Chronic | ICD-10-CM

## 2018-01-15 DIAGNOSIS — E66.9 DIABETES MELLITUS TYPE 2 IN OBESE (HCC): Primary | Chronic | ICD-10-CM

## 2018-01-15 LAB
A/G RATIO: 1.5 (ref 1.1–2.2)
ALBUMIN SERPL-MCNC: 4.5 G/DL (ref 3.4–5)
ALP BLD-CCNC: 59 U/L (ref 40–129)
ALT SERPL-CCNC: 11 U/L (ref 10–40)
ANION GAP SERPL CALCULATED.3IONS-SCNC: 16 MMOL/L (ref 3–16)
AST SERPL-CCNC: 19 U/L (ref 15–37)
BASOPHILS ABSOLUTE: 0 K/UL (ref 0–0.2)
BASOPHILS RELATIVE PERCENT: 0.6 %
BILIRUB SERPL-MCNC: 0.5 MG/DL (ref 0–1)
BUN BLDV-MCNC: 29 MG/DL (ref 7–20)
CALCIUM SERPL-MCNC: 10.9 MG/DL (ref 8.3–10.6)
CHLORIDE BLD-SCNC: 102 MMOL/L (ref 99–110)
CHOLESTEROL, TOTAL: 151 MG/DL (ref 0–199)
CO2: 26 MMOL/L (ref 21–32)
CREAT SERPL-MCNC: 2.3 MG/DL (ref 0.8–1.3)
EOSINOPHILS ABSOLUTE: 0.3 K/UL (ref 0–0.6)
EOSINOPHILS RELATIVE PERCENT: 4.4 %
GFR AFRICAN AMERICAN: 33
GFR NON-AFRICAN AMERICAN: 27
GLOBULIN: 3 G/DL
GLUCOSE BLD-MCNC: 101 MG/DL (ref 70–99)
HCT VFR BLD CALC: 41.3 % (ref 40.5–52.5)
HDLC SERPL-MCNC: 44 MG/DL (ref 40–60)
HEMOGLOBIN: 13.3 G/DL (ref 13.5–17.5)
LDL CHOLESTEROL CALCULATED: 84 MG/DL
LYMPHOCYTES ABSOLUTE: 1.7 K/UL (ref 1–5.1)
LYMPHOCYTES RELATIVE PERCENT: 27 %
MCH RBC QN AUTO: 31.8 PG (ref 26–34)
MCHC RBC AUTO-ENTMCNC: 32.2 G/DL (ref 31–36)
MCV RBC AUTO: 98.6 FL (ref 80–100)
MONOCYTES ABSOLUTE: 0.4 K/UL (ref 0–1.3)
MONOCYTES RELATIVE PERCENT: 7.2 %
NEUTROPHILS ABSOLUTE: 3.7 K/UL (ref 1.7–7.7)
NEUTROPHILS RELATIVE PERCENT: 60.8 %
PDW BLD-RTO: 16.4 % (ref 12.4–15.4)
PLATELET # BLD: 161 K/UL (ref 135–450)
PMV BLD AUTO: 11.5 FL (ref 5–10.5)
POTASSIUM SERPL-SCNC: 4.6 MMOL/L (ref 3.5–5.1)
RBC # BLD: 4.19 M/UL (ref 4.2–5.9)
SODIUM BLD-SCNC: 144 MMOL/L (ref 136–145)
TOTAL PROTEIN: 7.5 G/DL (ref 6.4–8.2)
TRIGL SERPL-MCNC: 117 MG/DL (ref 0–150)
VLDLC SERPL CALC-MCNC: 23 MG/DL
WBC # BLD: 6.1 K/UL (ref 4–11)

## 2018-01-15 PROCEDURE — G8484 FLU IMMUNIZE NO ADMIN: HCPCS | Performed by: INTERNAL MEDICINE

## 2018-01-15 PROCEDURE — 4040F PNEUMOC VAC/ADMIN/RCVD: CPT | Performed by: INTERNAL MEDICINE

## 2018-01-15 PROCEDURE — 1036F TOBACCO NON-USER: CPT | Performed by: INTERNAL MEDICINE

## 2018-01-15 PROCEDURE — G8417 CALC BMI ABV UP PARAM F/U: HCPCS | Performed by: INTERNAL MEDICINE

## 2018-01-15 PROCEDURE — G8598 ASA/ANTIPLAT THER USED: HCPCS | Performed by: INTERNAL MEDICINE

## 2018-01-15 PROCEDURE — 99214 OFFICE O/P EST MOD 30 MIN: CPT | Performed by: INTERNAL MEDICINE

## 2018-01-15 PROCEDURE — 1123F ACP DISCUSS/DSCN MKR DOCD: CPT | Performed by: INTERNAL MEDICINE

## 2018-01-15 PROCEDURE — G8427 DOCREV CUR MEDS BY ELIG CLIN: HCPCS | Performed by: INTERNAL MEDICINE

## 2018-01-15 ASSESSMENT — ENCOUNTER SYMPTOMS
VOMITING: 0
SHORTNESS OF BREATH: 0
CHEST TIGHTNESS: 0
ABDOMINAL PAIN: 0
NAUSEA: 0

## 2018-01-15 NOTE — PROGRESS NOTES
symptoms 11/3/2015    BPH (benign prostatic hypertrophy) 7/5/2011    Chronic gout without tophus 11/3/2015    Chronic kidney disease, stage III (moderate)     Chronic pain of both knees 11/8/2016    Coronary artery calcification seen on CT scan 2/18/2016    Diabetes mellitus, type 2 ([address]     Diastolic dysfunction 4/6/6630    Dyspnea on exertion 2/7/2014    Edema 1/8/2013    Elevated PSA 5/7/2015    Essential hypertension 11/3/2015    Gastroesophageal reflux disease without esophagitis 11/3/2015    GERD (gastroesophageal reflux disease) 7/5/2011    Gout 9/22/2011    Hepatic cyst 2/18/2016    Hypercalcemia 8/24/2017    Hyperlipidemia     Hypertension     Insomnia 6/29/2010    Localized edema 9/20/2016    Morbid obesity due to excess calories ([address] 11/8/2016    Multiple thyroid nodules 2/18/2016    Nocturia 5/7/2015    Obesity     Obstructive sleep apnea     Primary osteoarthritis of left knee 11/8/2016    Primary osteoarthritis of right knee 11/8/2016    Proteinuria 9/27/2010    Pulmonary emphysema ([address] 2/8/2017    Pulmonary nodules 2/4/2016    Shortness of breath 2/4/2016    Type 2 diabetes mellitus with diabetic chronic kidney disease ([address] 11/3/2015    Type 2 diabetes mellitus with stage 3 chronic kidney disease ([address] 2/4/2016    Type 2 diabetes mellitus with stage 3 chronic kidney disease, without long-term current use of insulin ([address] 8/5/2016    Vitamin D deficiency 8/21/2017       Social History:   TOBACCO:   reports that he has quit smoking. His smoking use included Cigarettes. He has never used smokeless tobacco.      ETOH:   reports that he does not drink alcohol. Current Medications:    Prior to Admission medications    Medication Sig Start Date End Date Taking?  Authorizing Provider   allopurinol (ZYLOPRIM) 100 MG tablet Take 2 tablets by mouth daily 12/20/17  Yes Cindy Stein MD   levothyroxine (LEVOTHROID) 25 MCG tablet Take 1 tablet by mouth daily 11/1/17 Body mass index is 44.22 kg/m². Wt Readings from Last 3 Encounters:   01/15/18 274 lb (124.3 kg)   10/12/17 275 lb (124.7 kg)   08/21/17 284 lb (128.8 kg)     Physical Exam   Constitutional: He is oriented to person, place, and time. He appears well-developed and well-nourished. No distress. HENT:   Head: Normocephalic and atraumatic. Right Ear: External ear normal.   Left Ear: External ear normal.   Mouth/Throat: Oropharynx is clear and moist. No oropharyngeal exudate. Eyes: Conjunctivae and EOM are normal. Right eye exhibits no discharge. Left eye exhibits no discharge. No scleral icterus. Neck: Normal range of motion. Neck supple. Cardiovascular: Normal rate and normal heart sounds. No murmur heard. Pulmonary/Chest: Effort normal and breath sounds normal. No respiratory distress. He has no wheezes. He has no rales. Abdominal: Soft. There is no tenderness. Musculoskeletal:   Feet:  No sensory- motor deficit  No wounds/callous/ cuts or rash  Nails: trimmed. I recommended daily foot exam, feet protection. Lymphadenopathy:     He has no cervical adenopathy. Neurological: He is alert and oriented to person, place, and time. He has normal reflexes. He exhibits normal muscle tone. Skin: No rash noted. He is not diaphoretic. No erythema. Assessment/Plan:   Valencia Gomez was seen today for hypertension and diabetes. Diagnoses and all orders for this visit:    Diabetes mellitus type 2 in [facility])  Well controlled- no changes in medication today. -     CBC Auto Differential; Future  -     Comprehensive Metabolic Panel; Future  -     Hemoglobin A1C; Future  -     Lipid Panel; Future    Chronic kidney disease, stage III (moderate)  Stable- no changes in medication today. He will see his nephrologist in a few days   -     Comprehensive Metabolic Panel; Future    Essential hypertension  He did not take his meds this morning.    He will check at home and follow up with his nephrologist.

## 2018-01-16 LAB
ESTIMATED AVERAGE GLUCOSE: 137 MG/DL
HBA1C MFR BLD: 6.4 %

## 2018-04-10 ENCOUNTER — TELEPHONE (OUTPATIENT)
Dept: INTERNAL MEDICINE | Age: 83
End: 2018-04-10

## 2018-04-10 DIAGNOSIS — R60.0 LOCALIZED EDEMA: Chronic | ICD-10-CM

## 2018-04-10 RX ORDER — TORSEMIDE 100 MG/1
50 TABLET ORAL 2 TIMES DAILY
Qty: 30 TABLET | Refills: 2 | Status: SHIPPED | OUTPATIENT
Start: 2018-04-10 | End: 2018-07-16 | Stop reason: SDUPTHER

## 2018-04-10 NOTE — TELEPHONE ENCOUNTER
Pt daughter stated the following:   needs refill on torsemide (DEMADEX) 100 MG tablet. Pt stated took last one today. Pt has an appointment on 4/16/18. Pharmacy on file verified.   Please call

## 2018-04-16 ENCOUNTER — OFFICE VISIT (OUTPATIENT)
Dept: INTERNAL MEDICINE | Age: 83
End: 2018-04-16

## 2018-04-16 VITALS
BODY MASS INDEX: 43.47 KG/M2 | HEIGHT: 67 IN | HEART RATE: 62 BPM | OXYGEN SATURATION: 95 % | WEIGHT: 277 LBS | DIASTOLIC BLOOD PRESSURE: 62 MMHG | SYSTOLIC BLOOD PRESSURE: 138 MMHG

## 2018-04-16 DIAGNOSIS — E66.9 DIABETES MELLITUS TYPE 2 IN OBESE (HCC): Chronic | ICD-10-CM

## 2018-04-16 DIAGNOSIS — E11.69 DIABETES MELLITUS TYPE 2 IN OBESE (HCC): Chronic | ICD-10-CM

## 2018-04-16 DIAGNOSIS — I10 ESSENTIAL HYPERTENSION: Primary | Chronic | ICD-10-CM

## 2018-04-16 DIAGNOSIS — E78.5 HYPERLIPIDEMIA, UNSPECIFIED HYPERLIPIDEMIA TYPE: Chronic | ICD-10-CM

## 2018-04-16 DIAGNOSIS — N18.30 CHRONIC KIDNEY DISEASE, STAGE III (MODERATE) (HCC): Chronic | ICD-10-CM

## 2018-04-16 PROCEDURE — 1123F ACP DISCUSS/DSCN MKR DOCD: CPT | Performed by: INTERNAL MEDICINE

## 2018-04-16 PROCEDURE — 4040F PNEUMOC VAC/ADMIN/RCVD: CPT | Performed by: INTERNAL MEDICINE

## 2018-04-16 PROCEDURE — G8598 ASA/ANTIPLAT THER USED: HCPCS | Performed by: INTERNAL MEDICINE

## 2018-04-16 PROCEDURE — G8417 CALC BMI ABV UP PARAM F/U: HCPCS | Performed by: INTERNAL MEDICINE

## 2018-04-16 PROCEDURE — 99214 OFFICE O/P EST MOD 30 MIN: CPT | Performed by: INTERNAL MEDICINE

## 2018-04-16 PROCEDURE — 1036F TOBACCO NON-USER: CPT | Performed by: INTERNAL MEDICINE

## 2018-04-16 PROCEDURE — G8427 DOCREV CUR MEDS BY ELIG CLIN: HCPCS | Performed by: INTERNAL MEDICINE

## 2018-04-16 ASSESSMENT — ENCOUNTER SYMPTOMS
VOMITING: 0
SHORTNESS OF BREATH: 0
NAUSEA: 0
ABDOMINAL PAIN: 0
CHEST TIGHTNESS: 0

## 2018-05-31 ENCOUNTER — TELEPHONE (OUTPATIENT)
Dept: INTERNAL MEDICINE | Age: 83
End: 2018-05-31

## 2018-05-31 DIAGNOSIS — H91.90 HEARING LOSS, UNSPECIFIED HEARING LOSS TYPE, UNSPECIFIED LATERALITY: Primary | ICD-10-CM

## 2018-07-16 ENCOUNTER — OFFICE VISIT (OUTPATIENT)
Dept: INTERNAL MEDICINE | Age: 83
End: 2018-07-16

## 2018-07-16 VITALS
HEART RATE: 68 BPM | OXYGEN SATURATION: 96 % | WEIGHT: 277 LBS | SYSTOLIC BLOOD PRESSURE: 122 MMHG | BODY MASS INDEX: 44.04 KG/M2 | DIASTOLIC BLOOD PRESSURE: 78 MMHG

## 2018-07-16 DIAGNOSIS — M1A.9XX0 CHRONIC GOUT WITHOUT TOPHUS, UNSPECIFIED CAUSE, UNSPECIFIED SITE: Chronic | ICD-10-CM

## 2018-07-16 DIAGNOSIS — I10 ESSENTIAL HYPERTENSION: Chronic | ICD-10-CM

## 2018-07-16 DIAGNOSIS — R60.0 LOCALIZED EDEMA: Chronic | ICD-10-CM

## 2018-07-16 DIAGNOSIS — E11.69 DIABETES MELLITUS TYPE 2 IN OBESE (HCC): Chronic | ICD-10-CM

## 2018-07-16 DIAGNOSIS — E78.5 HYPERLIPIDEMIA, UNSPECIFIED HYPERLIPIDEMIA TYPE: Chronic | ICD-10-CM

## 2018-07-16 DIAGNOSIS — E66.9 DIABETES MELLITUS TYPE 2 IN OBESE (HCC): Chronic | ICD-10-CM

## 2018-07-16 DIAGNOSIS — N18.30 CHRONIC KIDNEY DISEASE, STAGE III (MODERATE) (HCC): Primary | Chronic | ICD-10-CM

## 2018-07-16 DIAGNOSIS — J43.9 PULMONARY EMPHYSEMA, UNSPECIFIED EMPHYSEMA TYPE (HCC): Chronic | ICD-10-CM

## 2018-07-16 DIAGNOSIS — E83.52 HYPERCALCEMIA: ICD-10-CM

## 2018-07-16 DIAGNOSIS — N18.30 CHRONIC KIDNEY DISEASE, STAGE III (MODERATE) (HCC): Chronic | ICD-10-CM

## 2018-07-16 LAB
A/G RATIO: 1.5 (ref 1.1–2.2)
ALBUMIN SERPL-MCNC: 4.8 G/DL (ref 3.4–5)
ALP BLD-CCNC: 83 U/L (ref 40–129)
ALT SERPL-CCNC: 14 U/L (ref 10–40)
ANION GAP SERPL CALCULATED.3IONS-SCNC: 20 MMOL/L (ref 3–16)
AST SERPL-CCNC: 17 U/L (ref 15–37)
BASOPHILS ABSOLUTE: 0 K/UL (ref 0–0.2)
BASOPHILS RELATIVE PERCENT: 0.4 %
BILIRUB SERPL-MCNC: 0.5 MG/DL (ref 0–1)
BUN BLDV-MCNC: 37 MG/DL (ref 7–20)
CALCIUM SERPL-MCNC: 11 MG/DL (ref 8.3–10.6)
CHLORIDE BLD-SCNC: 90 MMOL/L (ref 99–110)
CHOLESTEROL, TOTAL: 170 MG/DL (ref 0–199)
CO2: 33 MMOL/L (ref 21–32)
CREAT SERPL-MCNC: 2.5 MG/DL (ref 0.8–1.3)
EOSINOPHILS ABSOLUTE: 0.2 K/UL (ref 0–0.6)
EOSINOPHILS RELATIVE PERCENT: 2.7 %
GFR AFRICAN AMERICAN: 30
GFR NON-AFRICAN AMERICAN: 25
GLOBULIN: 3.3 G/DL
GLUCOSE BLD-MCNC: 271 MG/DL (ref 70–99)
HCT VFR BLD CALC: 42.8 % (ref 40.5–52.5)
HDLC SERPL-MCNC: 44 MG/DL (ref 40–60)
HEMOGLOBIN: 14.2 G/DL (ref 13.5–17.5)
LDL CHOLESTEROL CALCULATED: 86 MG/DL
LYMPHOCYTES ABSOLUTE: 2.4 K/UL (ref 1–5.1)
LYMPHOCYTES RELATIVE PERCENT: 28.2 %
MCH RBC QN AUTO: 32.4 PG (ref 26–34)
MCHC RBC AUTO-ENTMCNC: 33.3 G/DL (ref 31–36)
MCV RBC AUTO: 97.3 FL (ref 80–100)
MONOCYTES ABSOLUTE: 0.6 K/UL (ref 0–1.3)
MONOCYTES RELATIVE PERCENT: 7.2 %
NEUTROPHILS ABSOLUTE: 5.2 K/UL (ref 1.7–7.7)
NEUTROPHILS RELATIVE PERCENT: 61.5 %
PDW BLD-RTO: 14.4 % (ref 12.4–15.4)
PLATELET # BLD: 178 K/UL (ref 135–450)
PMV BLD AUTO: 10.4 FL (ref 5–10.5)
POTASSIUM SERPL-SCNC: 3.1 MMOL/L (ref 3.5–5.1)
RBC # BLD: 4.39 M/UL (ref 4.2–5.9)
SODIUM BLD-SCNC: 143 MMOL/L (ref 136–145)
TOTAL PROTEIN: 8.1 G/DL (ref 6.4–8.2)
TRIGL SERPL-MCNC: 198 MG/DL (ref 0–150)
VLDLC SERPL CALC-MCNC: 40 MG/DL
WBC # BLD: 8.4 K/UL (ref 4–11)

## 2018-07-16 PROCEDURE — 4040F PNEUMOC VAC/ADMIN/RCVD: CPT | Performed by: INTERNAL MEDICINE

## 2018-07-16 PROCEDURE — 1123F ACP DISCUSS/DSCN MKR DOCD: CPT | Performed by: INTERNAL MEDICINE

## 2018-07-16 PROCEDURE — G8427 DOCREV CUR MEDS BY ELIG CLIN: HCPCS | Performed by: INTERNAL MEDICINE

## 2018-07-16 PROCEDURE — G8598 ASA/ANTIPLAT THER USED: HCPCS | Performed by: INTERNAL MEDICINE

## 2018-07-16 PROCEDURE — G8926 SPIRO NO PERF OR DOC: HCPCS | Performed by: INTERNAL MEDICINE

## 2018-07-16 PROCEDURE — 1036F TOBACCO NON-USER: CPT | Performed by: INTERNAL MEDICINE

## 2018-07-16 PROCEDURE — G8417 CALC BMI ABV UP PARAM F/U: HCPCS | Performed by: INTERNAL MEDICINE

## 2018-07-16 PROCEDURE — 1101F PT FALLS ASSESS-DOCD LE1/YR: CPT | Performed by: INTERNAL MEDICINE

## 2018-07-16 PROCEDURE — 99214 OFFICE O/P EST MOD 30 MIN: CPT | Performed by: INTERNAL MEDICINE

## 2018-07-16 PROCEDURE — 3023F SPIROM DOC REV: CPT | Performed by: INTERNAL MEDICINE

## 2018-07-16 RX ORDER — TORSEMIDE 100 MG/1
50 TABLET ORAL 2 TIMES DAILY
Qty: 90 TABLET | Refills: 3 | Status: SHIPPED | OUTPATIENT
Start: 2018-07-16 | End: 2019-01-16 | Stop reason: SDUPTHER

## 2018-07-16 RX ORDER — ALLOPURINOL 100 MG/1
200 TABLET ORAL DAILY
Qty: 180 TABLET | Refills: 5 | Status: SHIPPED | OUTPATIENT
Start: 2018-07-16 | End: 2019-08-10 | Stop reason: SDUPTHER

## 2018-07-16 ASSESSMENT — ENCOUNTER SYMPTOMS
VOMITING: 0
NAUSEA: 0
CHEST TIGHTNESS: 0
SHORTNESS OF BREATH: 0
ABDOMINAL PAIN: 0

## 2018-07-16 ASSESSMENT — PATIENT HEALTH QUESTIONNAIRE - PHQ9
SUM OF ALL RESPONSES TO PHQ QUESTIONS 1-9: 0
1. LITTLE INTEREST OR PLEASURE IN DOING THINGS: 0
SUM OF ALL RESPONSES TO PHQ9 QUESTIONS 1 & 2: 0
2. FEELING DOWN, DEPRESSED OR HOPELESS: 0

## 2018-07-16 NOTE — PROGRESS NOTES
edema 9/20/2016    Morbid obesity due to excess calories (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 11/3/2015    Type 2 diabetes mellitus with stage 3 chronic kidney disease ([facility] 2/4/2016    Type 2 diabetes mellitus with stage 3 chronic kidney disease, without long-term current use of insulin ([facility] 8/5/2016    Vitamin D deficiency 8/21/2017       Social History:   TOBACCO:   reports that he has quit smoking. His smoking use included Cigarettes. He has never used smokeless tobacco.    ETOH:   reports that he does not drink alcohol. Current Medications:    Prior to Admission medications    Medication Sig Start Date End Date Taking?  Authorizing Provider   torsemide (DEMADEX) 100 MG tablet Take 0.5 tablets by mouth 2 times daily 7/16/18  Yes Jamaal Basurto MD   allopurinol (ZYLOPRIM) 100 MG tablet Take 2 tablets by mouth daily 7/16/18  Yes Jamaal Basurto MD   insulin glargine (LANTUS SOLOSTAR) 100 UNIT/ML injection pen Inject 10 Units into the skin nightly 7/21/18   Jamaal Basurto MD   levothyroxine (LEVOTHROID) 25 MCG tablet Take 1 tablet by mouth daily 11/1/17   Jamaal Basurto MD   carvedilol (COREG) 25 MG tablet Take 1 tablet by mouth 2 times daily 11/1/17   Jamaal Basurto MD   famotidine (PEPCID) 20 MG tablet Take 1 tablet by mouth 2 times daily 11/1/17   Jamaal Basurto MD   tamsulosin (FLOMAX) 0.4 MG capsule Take 1 capsule by mouth nightly 11/1/17   Jamaal Basurto MD   simvastatin (ZOCOR) 20 MG tablet Take 1 tablet by mouth nightly 11/1/17   Jamaal Basurto MD   albuterol sulfate

## 2018-07-17 LAB
ESTIMATED AVERAGE GLUCOSE: 211.6 MG/DL
HBA1C MFR BLD: 9 %

## 2018-07-21 DIAGNOSIS — E66.9 DIABETES MELLITUS TYPE 2 IN OBESE (HCC): Primary | Chronic | ICD-10-CM

## 2018-07-21 DIAGNOSIS — E11.69 DIABETES MELLITUS TYPE 2 IN OBESE (HCC): Primary | Chronic | ICD-10-CM

## 2018-09-11 LAB
ALBUMIN SERPL-MCNC: 4.6 G/DL (ref 3.4–5)
ANION GAP SERPL CALCULATED.3IONS-SCNC: 14 MMOL/L (ref 3–16)
BASOPHILS ABSOLUTE: 0 K/UL (ref 0–0.2)
BASOPHILS RELATIVE PERCENT: 0.5 %
BUN BLDV-MCNC: 45 MG/DL (ref 7–20)
CALCIUM SERPL-MCNC: 11.6 MG/DL (ref 8.3–10.6)
CHLORIDE BLD-SCNC: 83 MMOL/L (ref 99–110)
CO2: 37 MMOL/L (ref 21–32)
CREAT SERPL-MCNC: 2.9 MG/DL (ref 0.8–1.3)
EOSINOPHILS ABSOLUTE: 0.2 K/UL (ref 0–0.6)
EOSINOPHILS RELATIVE PERCENT: 2.8 %
GFR AFRICAN AMERICAN: 25
GFR NON-AFRICAN AMERICAN: 21
GLUCOSE BLD-MCNC: 526 MG/DL (ref 70–99)
HCT VFR BLD CALC: 39.7 % (ref 40.5–52.5)
HEMOGLOBIN: 12.7 G/DL (ref 13.5–17.5)
LYMPHOCYTES ABSOLUTE: 1.8 K/UL (ref 1–5.1)
LYMPHOCYTES RELATIVE PERCENT: 31.9 %
MCH RBC QN AUTO: 31.1 PG (ref 26–34)
MCHC RBC AUTO-ENTMCNC: 32 G/DL (ref 31–36)
MCV RBC AUTO: 97 FL (ref 80–100)
MONOCYTES ABSOLUTE: 0.5 K/UL (ref 0–1.3)
MONOCYTES RELATIVE PERCENT: 8.7 %
NEUTROPHILS ABSOLUTE: 3.2 K/UL (ref 1.7–7.7)
NEUTROPHILS RELATIVE PERCENT: 56.1 %
PDW BLD-RTO: 15.1 % (ref 12.4–15.4)
PHOSPHORUS: 3 MG/DL (ref 2.5–4.9)
PLATELET # BLD: 155 K/UL (ref 135–450)
PMV BLD AUTO: 11.2 FL (ref 5–10.5)
POTASSIUM SERPL-SCNC: 3.5 MMOL/L (ref 3.5–5.1)
RBC # BLD: 4.1 M/UL (ref 4.2–5.9)
SODIUM BLD-SCNC: 134 MMOL/L (ref 136–145)
URIC ACID, SERUM: 10.1 MG/DL (ref 3.5–7.2)
WBC # BLD: 5.7 K/UL (ref 4–11)

## 2018-09-12 LAB
CALCIUM IONIZED, CALC AT PH 7.4: 1.45 MMOL/L (ref 1.11–1.3)
CALCIUM IONIZED: 1.38 MMOL/L (ref 1.11–1.3)

## 2018-10-16 ENCOUNTER — OFFICE VISIT (OUTPATIENT)
Dept: INTERNAL MEDICINE CLINIC | Age: 83
End: 2018-10-16
Payer: MEDICARE

## 2018-10-16 VITALS
BODY MASS INDEX: 46.11 KG/M2 | OXYGEN SATURATION: 98 % | SYSTOLIC BLOOD PRESSURE: 136 MMHG | WEIGHT: 290 LBS | HEART RATE: 66 BPM | DIASTOLIC BLOOD PRESSURE: 78 MMHG

## 2018-10-16 DIAGNOSIS — N18.30 CHRONIC KIDNEY DISEASE, STAGE III (MODERATE) (HCC): Chronic | ICD-10-CM

## 2018-10-16 DIAGNOSIS — D64.9 ANEMIA, UNSPECIFIED TYPE: Chronic | ICD-10-CM

## 2018-10-16 DIAGNOSIS — E83.52 HYPERCALCEMIA: ICD-10-CM

## 2018-10-16 DIAGNOSIS — N18.30 TYPE 2 DIABETES MELLITUS WITH STAGE 3 CHRONIC KIDNEY DISEASE, WITH LONG-TERM CURRENT USE OF INSULIN (HCC): Primary | ICD-10-CM

## 2018-10-16 DIAGNOSIS — I10 ESSENTIAL HYPERTENSION: Chronic | ICD-10-CM

## 2018-10-16 DIAGNOSIS — Z79.4 TYPE 2 DIABETES MELLITUS WITH STAGE 3 CHRONIC KIDNEY DISEASE, WITH LONG-TERM CURRENT USE OF INSULIN (HCC): Primary | ICD-10-CM

## 2018-10-16 DIAGNOSIS — E78.5 HYPERLIPIDEMIA, UNSPECIFIED HYPERLIPIDEMIA TYPE: Chronic | ICD-10-CM

## 2018-10-16 DIAGNOSIS — N40.1 BENIGN NON-NODULAR PROSTATIC HYPERPLASIA WITH LOWER URINARY TRACT SYMPTOMS: Chronic | ICD-10-CM

## 2018-10-16 DIAGNOSIS — K92.1 HEMATOCHEZIA: ICD-10-CM

## 2018-10-16 DIAGNOSIS — Z23 NEED FOR INFLUENZA VACCINATION: ICD-10-CM

## 2018-10-16 DIAGNOSIS — E11.22 TYPE 2 DIABETES MELLITUS WITH STAGE 3 CHRONIC KIDNEY DISEASE, WITH LONG-TERM CURRENT USE OF INSULIN (HCC): Primary | ICD-10-CM

## 2018-10-16 PROCEDURE — 1036F TOBACCO NON-USER: CPT | Performed by: INTERNAL MEDICINE

## 2018-10-16 PROCEDURE — G8417 CALC BMI ABV UP PARAM F/U: HCPCS | Performed by: INTERNAL MEDICINE

## 2018-10-16 PROCEDURE — G8598 ASA/ANTIPLAT THER USED: HCPCS | Performed by: INTERNAL MEDICINE

## 2018-10-16 PROCEDURE — G0008 ADMIN INFLUENZA VIRUS VAC: HCPCS | Performed by: INTERNAL MEDICINE

## 2018-10-16 PROCEDURE — G8428 CUR MEDS NOT DOCUMENT: HCPCS | Performed by: INTERNAL MEDICINE

## 2018-10-16 PROCEDURE — 1123F ACP DISCUSS/DSCN MKR DOCD: CPT | Performed by: INTERNAL MEDICINE

## 2018-10-16 PROCEDURE — 1101F PT FALLS ASSESS-DOCD LE1/YR: CPT | Performed by: INTERNAL MEDICINE

## 2018-10-16 PROCEDURE — 90662 IIV NO PRSV INCREASED AG IM: CPT | Performed by: INTERNAL MEDICINE

## 2018-10-16 PROCEDURE — 99214 OFFICE O/P EST MOD 30 MIN: CPT | Performed by: INTERNAL MEDICINE

## 2018-10-16 PROCEDURE — G8482 FLU IMMUNIZE ORDER/ADMIN: HCPCS | Performed by: INTERNAL MEDICINE

## 2018-10-16 PROCEDURE — 4040F PNEUMOC VAC/ADMIN/RCVD: CPT | Performed by: INTERNAL MEDICINE

## 2018-10-16 RX ORDER — TAMSULOSIN HYDROCHLORIDE 0.4 MG/1
0.4 CAPSULE ORAL NIGHTLY
Qty: 90 CAPSULE | Refills: 3 | Status: SHIPPED | OUTPATIENT
Start: 2018-10-16 | End: 2019-12-20

## 2018-10-16 RX ORDER — FAMOTIDINE 20 MG/1
20 TABLET, FILM COATED ORAL 2 TIMES DAILY
Qty: 180 TABLET | Refills: 3 | Status: SHIPPED | OUTPATIENT
Start: 2018-10-16 | End: 2019-12-20

## 2018-10-16 RX ORDER — LANCETS
EACH MISCELLANEOUS
Qty: 100 EACH | Refills: 3 | Status: SHIPPED | OUTPATIENT
Start: 2018-10-16 | End: 2021-09-02 | Stop reason: SDUPTHER

## 2018-10-16 RX ORDER — SIMVASTATIN 20 MG
20 TABLET ORAL NIGHTLY
Qty: 90 TABLET | Refills: 3 | Status: SHIPPED | OUTPATIENT
Start: 2018-10-16 | End: 2020-01-07

## 2018-10-16 RX ORDER — BLOOD SUGAR DIAGNOSTIC
STRIP MISCELLANEOUS
Qty: 100 STRIP | Refills: 3 | Status: SHIPPED | OUTPATIENT
Start: 2018-10-16 | End: 2020-05-27

## 2018-10-16 ASSESSMENT — ENCOUNTER SYMPTOMS
SHORTNESS OF BREATH: 0
ABDOMINAL PAIN: 0
BLOOD IN STOOL: 0
VOMITING: 0
CHEST TIGHTNESS: 0
NAUSEA: 0

## 2018-10-16 NOTE — PATIENT INSTRUCTIONS
Please keep a log of glucose levels ( before each meal, 3 times daily) and send me results in 2-3 weeks.

## 2018-10-18 DIAGNOSIS — D50.9 MICROCYTIC ANEMIA: Primary | ICD-10-CM

## 2018-11-05 ENCOUNTER — ANESTHESIA EVENT (OUTPATIENT)
Dept: ENDOSCOPY | Age: 83
End: 2018-11-05
Payer: MEDICARE

## 2018-11-06 ENCOUNTER — HOSPITAL ENCOUNTER (OUTPATIENT)
Age: 83
Setting detail: OUTPATIENT SURGERY
Discharge: HOME | End: 2018-11-06
Attending: INTERNAL MEDICINE | Admitting: INTERNAL MEDICINE
Payer: MEDICARE

## 2018-11-06 ENCOUNTER — ANESTHESIA (OUTPATIENT)
Dept: ENDOSCOPY | Age: 83
End: 2018-11-06
Payer: MEDICARE

## 2018-11-06 VITALS — DIASTOLIC BLOOD PRESSURE: 72 MMHG | SYSTOLIC BLOOD PRESSURE: 132 MMHG | OXYGEN SATURATION: 99 % | TEMPERATURE: 96.8 F

## 2018-11-06 VITALS
TEMPERATURE: 97.9 F | RESPIRATION RATE: 16 BRPM | WEIGHT: 280 LBS | HEART RATE: 54 BPM | DIASTOLIC BLOOD PRESSURE: 76 MMHG | OXYGEN SATURATION: 92 % | SYSTOLIC BLOOD PRESSURE: 124 MMHG | BODY MASS INDEX: 43.95 KG/M2 | HEIGHT: 67 IN

## 2018-11-06 LAB
GLUCOSE BLD-MCNC: 101 MG/DL (ref 70–99)
PERFORMED ON: ABNORMAL

## 2018-11-06 PROCEDURE — 88305 TISSUE EXAM BY PATHOLOGIST: CPT

## 2018-11-06 PROCEDURE — 3609012400 HC EGD TRANSORAL BIOPSY SINGLE/MULTIPLE: Performed by: INTERNAL MEDICINE

## 2018-11-06 PROCEDURE — 7100000011 HC PHASE II RECOVERY - ADDTL 15 MIN: Performed by: INTERNAL MEDICINE

## 2018-11-06 PROCEDURE — 3609009500 HC COLONOSCOPY DIAGNOSTIC OR SCREENING: Performed by: INTERNAL MEDICINE

## 2018-11-06 PROCEDURE — 6360000002 HC RX W HCPCS: Performed by: NURSE ANESTHETIST, CERTIFIED REGISTERED

## 2018-11-06 PROCEDURE — 7100000010 HC PHASE II RECOVERY - FIRST 15 MIN: Performed by: INTERNAL MEDICINE

## 2018-11-06 PROCEDURE — 3700000000 HC ANESTHESIA ATTENDED CARE: Performed by: INTERNAL MEDICINE

## 2018-11-06 PROCEDURE — 3700000001 HC ADD 15 MINUTES (ANESTHESIA): Performed by: INTERNAL MEDICINE

## 2018-11-06 PROCEDURE — 2500000003 HC RX 250 WO HCPCS: Performed by: INTERNAL MEDICINE

## 2018-11-06 PROCEDURE — 2580000003 HC RX 258: Performed by: NURSE ANESTHETIST, CERTIFIED REGISTERED

## 2018-11-06 PROCEDURE — 2709999900 HC NON-CHARGEABLE SUPPLY: Performed by: INTERNAL MEDICINE

## 2018-11-06 RX ORDER — SODIUM CHLORIDE, SODIUM LACTATE, POTASSIUM CHLORIDE, CALCIUM CHLORIDE 600; 310; 30; 20 MG/100ML; MG/100ML; MG/100ML; MG/100ML
INJECTION, SOLUTION INTRAVENOUS CONTINUOUS PRN
Status: DISCONTINUED | OUTPATIENT
Start: 2018-11-06 | End: 2018-11-06 | Stop reason: SDUPTHER

## 2018-11-06 RX ORDER — PROPOFOL 10 MG/ML
INJECTION, EMULSION INTRAVENOUS PRN
Status: DISCONTINUED | OUTPATIENT
Start: 2018-11-06 | End: 2018-11-06 | Stop reason: SDUPTHER

## 2018-11-06 RX ORDER — POTASSIUM CHLORIDE 1.5 G/1.77G
20 POWDER, FOR SOLUTION ORAL 2 TIMES DAILY
COMMUNITY
End: 2019-01-16 | Stop reason: SDUPTHER

## 2018-11-06 RX ADMIN — SODIUM CHLORIDE, SODIUM LACTATE, POTASSIUM CHLORIDE, AND CALCIUM CHLORIDE: 600; 310; 30; 20 INJECTION, SOLUTION INTRAVENOUS at 13:00

## 2018-11-06 RX ADMIN — PROPOFOL 100 MG: 10 INJECTION, EMULSION INTRAVENOUS at 13:24

## 2018-11-06 RX ADMIN — PROPOFOL 60 MG: 10 INJECTION, EMULSION INTRAVENOUS at 13:10

## 2018-11-06 RX ADMIN — PROPOFOL 20 MG: 10 INJECTION, EMULSION INTRAVENOUS at 13:07

## 2018-11-06 ASSESSMENT — PULMONARY FUNCTION TESTS
PIF_VALUE: 0

## 2018-11-06 ASSESSMENT — ENCOUNTER SYMPTOMS: SHORTNESS OF BREATH: 1

## 2018-11-06 ASSESSMENT — PAIN SCALES - GENERAL: PAINLEVEL_OUTOF10: 0

## 2018-11-06 ASSESSMENT — PAIN - FUNCTIONAL ASSESSMENT: PAIN_FUNCTIONAL_ASSESSMENT: 0-10

## 2018-11-06 ASSESSMENT — COPD QUESTIONNAIRES: CAT_SEVERITY: MODERATE

## 2018-11-06 NOTE — ANESTHESIA PRE PROCEDURE
use No                                Counseling given: Not Answered      Vital Signs (Current): There were no vitals filed for this visit. BP Readings from Last 3 Encounters:   10/16/18 136/78   07/16/18 122/78   04/16/18 138/62       NPO Status:                                                                                 BMI:   Wt Readings from Last 3 Encounters:   10/16/18 290 lb (131.5 kg)   07/16/18 277 lb (125.6 kg)   04/16/18 277 lb (125.6 kg)     There is no height or weight on file to calculate BMI.    CBC:   Lab Results   Component Value Date    WBC 7.5 10/16/2018    RBC 3.30 10/16/2018    HGB 10.4 10/16/2018    HCT 32.4 10/16/2018    MCV 98.4 10/16/2018    RDW 15.6 10/16/2018     10/16/2018       CMP:   Lab Results   Component Value Date     10/16/2018    K 5.3 10/16/2018    CL 99 10/16/2018    CO2 25 10/16/2018    BUN 17 10/16/2018    CREATININE 1.8 10/16/2018    GFRAA 44 10/16/2018    GFRAA 58 05/10/2013    AGRATIO 1.5 07/16/2018    LABGLOM 36 10/16/2018    GLUCOSE 67 10/16/2018    GLUCOSE 96 10/06/2011    PROT 8.1 07/16/2018    PROT 8.6 10/08/2012    CALCIUM 10.7 10/16/2018    BILITOT 0.5 07/16/2018    ALKPHOS 83 07/16/2018    AST 17 07/16/2018    ALT 14 07/16/2018       POC Tests: No results for input(s): POCGLU, POCNA, POCK, POCCL, POCBUN, POCHEMO, POCHCT in the last 72 hours.     Coags: No results found for: PROTIME, INR, APTT    HCG (If Applicable): No results found for: PREGTESTUR, PREGSERUM, HCG, HCGQUANT     ABGs: No results found for: PHART, PO2ART, UPH3JKG, MFJ5UTN, BEART, H6COGBPL     Type & Screen (If Applicable):  No results found for: LABABO, [address]    Anesthesia Evaluation  Patient summary reviewed and Nursing notes reviewed no history of anesthetic complications:   Airway: Mallampati: III  TM distance: >3 FB   Neck ROM: full  Mouth opening: > = 3 FB Dental: normal exam   (+) edentulous      Pulmonary:normal exam  breath sounds clear

## 2018-11-14 ENCOUNTER — TELEPHONE (OUTPATIENT)
Dept: INTERNAL MEDICINE CLINIC | Age: 83
End: 2018-11-14

## 2018-11-27 DIAGNOSIS — I10 ESSENTIAL HYPERTENSION: Chronic | ICD-10-CM

## 2018-11-27 RX ORDER — CARVEDILOL 25 MG/1
25 TABLET ORAL 2 TIMES DAILY
Qty: 180 TABLET | Refills: 3 | Status: SHIPPED | OUTPATIENT
Start: 2018-11-27 | End: 2019-12-20

## 2018-11-29 ENCOUNTER — TELEPHONE (OUTPATIENT)
Dept: INTERNAL MEDICINE CLINIC | Age: 83
End: 2018-11-29

## 2018-12-18 LAB
ALBUMIN SERPL-MCNC: 4.6 G/DL (ref 3.4–5)
ANION GAP SERPL CALCULATED.3IONS-SCNC: 15 MMOL/L (ref 3–16)
BASOPHILS ABSOLUTE: 0 K/UL (ref 0–0.2)
BASOPHILS RELATIVE PERCENT: 0.8 %
BUN BLDV-MCNC: 26 MG/DL (ref 7–20)
CALCIUM SERPL-MCNC: 10.6 MG/DL (ref 8.3–10.6)
CHLORIDE BLD-SCNC: 99 MMOL/L (ref 99–110)
CO2: 32 MMOL/L (ref 21–32)
CREAT SERPL-MCNC: 2 MG/DL (ref 0.8–1.3)
EOSINOPHILS ABSOLUTE: 0.2 K/UL (ref 0–0.6)
EOSINOPHILS RELATIVE PERCENT: 4.3 %
ESTIMATED AVERAGE GLUCOSE: 131.2 MG/DL
FERRITIN: 31 NG/ML (ref 30–400)
FOLATE: >20 NG/ML (ref 4.78–24.2)
GFR AFRICAN AMERICAN: 39
GFR NON-AFRICAN AMERICAN: 32
GLUCOSE BLD-MCNC: 82 MG/DL (ref 70–99)
HBA1C MFR BLD: 6.2 %
HCT VFR BLD CALC: 36 % (ref 40.5–52.5)
HEMOGLOBIN: 11.3 G/DL (ref 13.5–17.5)
IRON SATURATION: 16 % (ref 20–50)
IRON: 71 UG/DL (ref 59–158)
LYMPHOCYTES ABSOLUTE: 2.2 K/UL (ref 1–5.1)
LYMPHOCYTES RELATIVE PERCENT: 42.2 %
MAGNESIUM: 2.4 MG/DL (ref 1.8–2.4)
MCH RBC QN AUTO: 28.5 PG (ref 26–34)
MCHC RBC AUTO-ENTMCNC: 31.3 G/DL (ref 31–36)
MCV RBC AUTO: 91.1 FL (ref 80–100)
MONOCYTES ABSOLUTE: 0.6 K/UL (ref 0–1.3)
MONOCYTES RELATIVE PERCENT: 11.6 %
NEUTROPHILS ABSOLUTE: 2.2 K/UL (ref 1.7–7.7)
NEUTROPHILS RELATIVE PERCENT: 41.1 %
PDW BLD-RTO: 18 % (ref 12.4–15.4)
PHOSPHORUS: 4 MG/DL (ref 2.5–4.9)
PLATELET # BLD: 217 K/UL (ref 135–450)
PMV BLD AUTO: 9 FL (ref 5–10.5)
POTASSIUM SERPL-SCNC: 4.2 MMOL/L (ref 3.5–5.1)
RBC # BLD: 3.95 M/UL (ref 4.2–5.9)
SODIUM BLD-SCNC: 146 MMOL/L (ref 136–145)
TOTAL IRON BINDING CAPACITY: 441 UG/DL (ref 260–445)
URIC ACID, SERUM: 6.4 MG/DL (ref 3.5–7.2)
VITAMIN B-12: 669 PG/ML (ref 211–911)
WBC # BLD: 5.2 K/UL (ref 4–11)

## 2019-01-16 ENCOUNTER — OFFICE VISIT (OUTPATIENT)
Dept: INTERNAL MEDICINE CLINIC | Age: 84
End: 2019-01-16
Payer: MEDICARE

## 2019-01-16 VITALS
SYSTOLIC BLOOD PRESSURE: 142 MMHG | OXYGEN SATURATION: 95 % | WEIGHT: 298 LBS | BODY MASS INDEX: 46.67 KG/M2 | DIASTOLIC BLOOD PRESSURE: 82 MMHG | HEART RATE: 65 BPM

## 2019-01-16 DIAGNOSIS — E83.52 HYPERCALCEMIA: Chronic | ICD-10-CM

## 2019-01-16 DIAGNOSIS — D64.9 ANEMIA, UNSPECIFIED TYPE: Primary | Chronic | ICD-10-CM

## 2019-01-16 DIAGNOSIS — I10 ESSENTIAL HYPERTENSION: Chronic | ICD-10-CM

## 2019-01-16 DIAGNOSIS — E11.69 DIABETES MELLITUS TYPE 2 IN OBESE (HCC): Chronic | ICD-10-CM

## 2019-01-16 DIAGNOSIS — D64.9 ANEMIA, UNSPECIFIED TYPE: Chronic | ICD-10-CM

## 2019-01-16 DIAGNOSIS — N18.30 CHRONIC KIDNEY DISEASE, STAGE III (MODERATE) (HCC): Chronic | ICD-10-CM

## 2019-01-16 DIAGNOSIS — E66.9 DIABETES MELLITUS TYPE 2 IN OBESE (HCC): Chronic | ICD-10-CM

## 2019-01-16 DIAGNOSIS — E78.5 HYPERLIPIDEMIA, UNSPECIFIED HYPERLIPIDEMIA TYPE: Chronic | ICD-10-CM

## 2019-01-16 DIAGNOSIS — E04.2 MULTIPLE THYROID NODULES: Chronic | ICD-10-CM

## 2019-01-16 DIAGNOSIS — R60.0 LOCALIZED EDEMA: Chronic | ICD-10-CM

## 2019-01-16 LAB
A/G RATIO: 1.3 (ref 1.1–2.2)
ALBUMIN SERPL-MCNC: 4.7 G/DL (ref 3.4–5)
ALP BLD-CCNC: 73 U/L (ref 40–129)
ALT SERPL-CCNC: 10 U/L (ref 10–40)
ANION GAP SERPL CALCULATED.3IONS-SCNC: 16 MMOL/L (ref 3–16)
AST SERPL-CCNC: 18 U/L (ref 15–37)
BASOPHILS ABSOLUTE: 0 K/UL (ref 0–0.2)
BASOPHILS RELATIVE PERCENT: 0.4 %
BILIRUB SERPL-MCNC: 0.4 MG/DL (ref 0–1)
BUN BLDV-MCNC: 20 MG/DL (ref 7–20)
CALCIUM SERPL-MCNC: 10.9 MG/DL (ref 8.3–10.6)
CHLORIDE BLD-SCNC: 98 MMOL/L (ref 99–110)
CO2: 30 MMOL/L (ref 21–32)
CREAT SERPL-MCNC: 2 MG/DL (ref 0.8–1.3)
EOSINOPHILS ABSOLUTE: 0.4 K/UL (ref 0–0.6)
EOSINOPHILS RELATIVE PERCENT: 5.1 %
FERRITIN: 35.4 NG/ML (ref 30–400)
GFR AFRICAN AMERICAN: 39
GFR NON-AFRICAN AMERICAN: 32
GLOBULIN: 3.6 G/DL
GLUCOSE BLD-MCNC: 74 MG/DL (ref 70–99)
HCT VFR BLD CALC: 39.7 % (ref 40.5–52.5)
HEMOGLOBIN: 12.6 G/DL (ref 13.5–17.5)
LYMPHOCYTES ABSOLUTE: 2.4 K/UL (ref 1–5.1)
LYMPHOCYTES RELATIVE PERCENT: 32.3 %
MCH RBC QN AUTO: 28.5 PG (ref 26–34)
MCHC RBC AUTO-ENTMCNC: 31.7 G/DL (ref 31–36)
MCV RBC AUTO: 89.7 FL (ref 80–100)
MONOCYTES ABSOLUTE: 0.7 K/UL (ref 0–1.3)
MONOCYTES RELATIVE PERCENT: 9 %
NEUTROPHILS ABSOLUTE: 4 K/UL (ref 1.7–7.7)
NEUTROPHILS RELATIVE PERCENT: 53.2 %
PDW BLD-RTO: 18.1 % (ref 12.4–15.4)
PLATELET # BLD: 227 K/UL (ref 135–450)
PMV BLD AUTO: 9.2 FL (ref 5–10.5)
POTASSIUM SERPL-SCNC: 4.4 MMOL/L (ref 3.5–5.1)
RBC # BLD: 4.43 M/UL (ref 4.2–5.9)
SODIUM BLD-SCNC: 144 MMOL/L (ref 136–145)
TOTAL PROTEIN: 8.3 G/DL (ref 6.4–8.2)
WBC # BLD: 7.5 K/UL (ref 4–11)

## 2019-01-16 PROCEDURE — 4040F PNEUMOC VAC/ADMIN/RCVD: CPT | Performed by: INTERNAL MEDICINE

## 2019-01-16 PROCEDURE — 99214 OFFICE O/P EST MOD 30 MIN: CPT | Performed by: INTERNAL MEDICINE

## 2019-01-16 PROCEDURE — G8482 FLU IMMUNIZE ORDER/ADMIN: HCPCS | Performed by: INTERNAL MEDICINE

## 2019-01-16 PROCEDURE — 1101F PT FALLS ASSESS-DOCD LE1/YR: CPT | Performed by: INTERNAL MEDICINE

## 2019-01-16 PROCEDURE — 1036F TOBACCO NON-USER: CPT | Performed by: INTERNAL MEDICINE

## 2019-01-16 PROCEDURE — G8427 DOCREV CUR MEDS BY ELIG CLIN: HCPCS | Performed by: INTERNAL MEDICINE

## 2019-01-16 PROCEDURE — 1123F ACP DISCUSS/DSCN MKR DOCD: CPT | Performed by: INTERNAL MEDICINE

## 2019-01-16 PROCEDURE — G8598 ASA/ANTIPLAT THER USED: HCPCS | Performed by: INTERNAL MEDICINE

## 2019-01-16 PROCEDURE — G8417 CALC BMI ABV UP PARAM F/U: HCPCS | Performed by: INTERNAL MEDICINE

## 2019-01-16 RX ORDER — TORSEMIDE 100 MG/1
TABLET ORAL
Qty: 90 TABLET | Refills: 3 | Status: SHIPPED | OUTPATIENT
Start: 2019-01-16 | End: 2019-08-12 | Stop reason: SDUPTHER

## 2019-01-16 RX ORDER — LEVOTHYROXINE SODIUM 0.03 MG/1
25 TABLET ORAL DAILY
Qty: 90 TABLET | Refills: 3 | Status: SHIPPED | OUTPATIENT
Start: 2019-01-16 | End: 2020-06-23 | Stop reason: SDUPTHER

## 2019-01-16 RX ORDER — POTASSIUM CHLORIDE 1.5 G/1.77G
20 POWDER, FOR SOLUTION ORAL 2 TIMES DAILY
Qty: 180 EACH | Refills: 3 | Status: SHIPPED | OUTPATIENT
Start: 2019-01-16 | End: 2019-01-21

## 2019-01-16 ASSESSMENT — ENCOUNTER SYMPTOMS
NAUSEA: 0
VOMITING: 0
CHEST TIGHTNESS: 0
ABDOMINAL PAIN: 0
SHORTNESS OF BREATH: 0

## 2019-01-20 DIAGNOSIS — D64.9 ANEMIA, UNSPECIFIED TYPE: Primary | Chronic | ICD-10-CM

## 2019-01-20 RX ORDER — FERROUS SULFATE 325(65) MG
325 TABLET ORAL EVERY OTHER DAY
Qty: 45 TABLET | Refills: 0 | Status: SHIPPED | OUTPATIENT
Start: 2019-01-20 | End: 2020-09-24 | Stop reason: SDUPTHER

## 2019-01-21 ENCOUNTER — TELEPHONE (OUTPATIENT)
Dept: INTERNAL MEDICINE CLINIC | Age: 84
End: 2019-01-21

## 2019-01-21 RX ORDER — POTASSIUM CHLORIDE 20 MEQ/1
20 TABLET, EXTENDED RELEASE ORAL DAILY
Qty: 90 TABLET | Refills: 1 | Status: SHIPPED | OUTPATIENT
Start: 2019-01-21 | End: 2019-07-24 | Stop reason: SDUPTHER

## 2019-05-07 ENCOUNTER — HOSPITAL ENCOUNTER (OUTPATIENT)
Dept: GENERAL RADIOLOGY | Age: 84
Discharge: HOME | End: 2019-05-07
Payer: MEDICARE

## 2019-05-07 ENCOUNTER — HOSPITAL ENCOUNTER (OUTPATIENT)
Age: 84
Discharge: HOME | End: 2019-05-07
Payer: MEDICARE

## 2019-05-07 ENCOUNTER — OFFICE VISIT (OUTPATIENT)
Dept: INTERNAL MEDICINE CLINIC | Age: 84
End: 2019-05-07
Payer: MEDICARE

## 2019-05-07 VITALS — HEART RATE: 68 BPM | SYSTOLIC BLOOD PRESSURE: 120 MMHG | DIASTOLIC BLOOD PRESSURE: 60 MMHG | OXYGEN SATURATION: 96 %

## 2019-05-07 DIAGNOSIS — I10 ESSENTIAL HYPERTENSION: Chronic | ICD-10-CM

## 2019-05-07 DIAGNOSIS — D64.9 ANEMIA, UNSPECIFIED TYPE: Chronic | ICD-10-CM
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
## 2019-05-07 DIAGNOSIS — M25.552 LEFT HIP PAIN: ICD-10-CM

## 2019-05-07 DIAGNOSIS — E66.9 DIABETES MELLITUS TYPE 2 IN OBESE (HCC): Chronic | ICD-10-CM

## 2019-05-07 DIAGNOSIS — N18.30 CHRONIC KIDNEY DISEASE, STAGE III (MODERATE) (HCC): Chronic | ICD-10-CM

## 2019-05-07 DIAGNOSIS — N50.89 TESTICULAR MASS: ICD-10-CM

## 2019-05-07 DIAGNOSIS — L03.317 CELLULITIS OF BUTTOCK: ICD-10-CM

## 2019-05-07 DIAGNOSIS — E11.69 DIABETES MELLITUS TYPE 2 IN OBESE (HCC): Chronic | ICD-10-CM

## 2019-05-07 DIAGNOSIS — E78.5 HYPERLIPIDEMIA, UNSPECIFIED HYPERLIPIDEMIA TYPE: Chronic | ICD-10-CM

## 2019-05-07 DIAGNOSIS — R29.898 LEG WEAKNESS, BILATERAL: ICD-10-CM

## 2019-05-07 DIAGNOSIS — N50.89 TESTICULAR MASS: Primary | ICD-10-CM

## 2019-05-07 LAB
BASOPHILS ABSOLUTE: 0.1 K/UL (ref 0–0.2)
BASOPHILS RELATIVE PERCENT: 0.7 %
EOSINOPHILS ABSOLUTE: 0.3 K/UL (ref 0–0.6)
EOSINOPHILS RELATIVE PERCENT: 3.2 %
HCT VFR BLD CALC: 37.6 % (ref 40.5–52.5)
HEMOGLOBIN: 12.2 G/DL (ref 13.5–17.5)
LYMPHOCYTES ABSOLUTE: 1.5 K/UL (ref 1–5.1)
LYMPHOCYTES RELATIVE PERCENT: 18.1 %
MCH RBC QN AUTO: 30.7 PG (ref 26–34)
MCHC RBC AUTO-ENTMCNC: 32.5 G/DL (ref 31–36)
MCV RBC AUTO: 94.6 FL (ref 80–100)
MONOCYTES ABSOLUTE: 0.7 K/UL (ref 0–1.3)
MONOCYTES RELATIVE PERCENT: 8.9 %
NEUTROPHILS ABSOLUTE: 5.6 K/UL (ref 1.7–7.7)
NEUTROPHILS RELATIVE PERCENT: 69.1 %
PDW BLD-RTO: 16.2 % (ref 12.4–15.4)
PLATELET # BLD: 293 K/UL (ref 135–450)
PMV BLD AUTO: 9.2 FL (ref 5–10.5)
RBC # BLD: 3.97 M/UL (ref 4.2–5.9)
WBC # BLD: 8.1 K/UL (ref 4–11)

## 2019-05-07 PROCEDURE — 99214 OFFICE O/P EST MOD 30 MIN: CPT | Performed by: INTERNAL MEDICINE

## 2019-05-07 PROCEDURE — 1036F TOBACCO NON-USER: CPT | Performed by: INTERNAL MEDICINE

## 2019-05-07 PROCEDURE — 1123F ACP DISCUSS/DSCN MKR DOCD: CPT | Performed by: INTERNAL MEDICINE

## 2019-05-07 PROCEDURE — 73502 X-RAY EXAM HIP UNI 2-3 VIEWS: CPT

## 2019-05-07 PROCEDURE — G8417 CALC BMI ABV UP PARAM F/U: HCPCS | Performed by: INTERNAL MEDICINE

## 2019-05-07 PROCEDURE — 4040F PNEUMOC VAC/ADMIN/RCVD: CPT | Performed by: INTERNAL MEDICINE

## 2019-05-07 PROCEDURE — G8427 DOCREV CUR MEDS BY ELIG CLIN: HCPCS | Performed by: INTERNAL MEDICINE

## 2019-05-07 PROCEDURE — G8598 ASA/ANTIPLAT THER USED: HCPCS | Performed by: INTERNAL MEDICINE

## 2019-05-07 RX ORDER — LEVOFLOXACIN 500 MG/1
500 TABLET, FILM COATED ORAL DAILY
Qty: 10 TABLET | Refills: 0 | Status: SHIPPED | OUTPATIENT
Start: 2019-05-07 | End: 2019-05-17

## 2019-05-07 ASSESSMENT — ENCOUNTER SYMPTOMS
SHORTNESS OF BREATH: 0
NAUSEA: 0
ABDOMINAL PAIN: 0
CHEST TIGHTNESS: 0
VOMITING: 0

## 2019-05-07 NOTE — PROGRESS NOTES
Outpatient Note for established Patient - regular Visit    History Obtained From:  patient, electronic medical record  Is the patient new to me ? - No    HISTORY OF PRESENT ILLNESS:   The patient is a 80 y.o. male who is here today for :  Pt had a fall (mechanical) he was was disoriented after his fall. Last 11 days ago. He did not hit his head, he did not loose his conscious. He describes dysequilibrium, no dizziness. Pt denies CP/SOB/palpitations/abdominal pain/N/V. No syncope/ presyncope. Today he is /o R knee pain  He is c/o b/l leg swelling. BP is controlled, he is torsemide and coreg   He is c/o gluteal pain since his fall as well   His A1C     Lab Results   Component Value Date    LABA1C 6.2 12/17/2018     Lab Results   Component Value Date    .2 12/17/2018   he is on Insulin lantus 16 units   FBG at home- 110-130 ~mg%      He is also c/o testicular pain , no dysuric Sx. For the past 3-4 years   No fever/ chills.       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    Atelectasis of left lung 2/18/2016    Bell's palsy 7/24/2015    Left sided - July 2015    Benign non-nodular prostatic hyperplasia with lower urinary tract symptoms 11/3/2015    BPH (benign prostatic hypertrophy) 7/5/2011    Chronic gout without tophus 11/3/2015    Chronic kidney disease, stage III (moderate) (HCC)     Chronic pain of both knees 11/8/2016    Coronary artery calcification seen on CT scan 2/18/2016    Diabetes mellitus, type 2 ([address]     Diastolic dysfunction 4/3/9957    Dyspnea on exertion 2/7/2014    Edema 1/8/2013    Elevated PSA 5/7/2015    Essential hypertension 11/3/2015    Gastroesophageal reflux disease without esophagitis 11/3/2015    GERD (gastroesophageal reflux disease) 7/5/2011    Gout 9/22/2011    Hepatic cyst 2/18/2016    Hypercalcemia 8/24/2017    Hyperlipidemia     Hypertension     Insomnia 6/29/2010    Localized edema 9/20/2016    Morbid obesity due to excess calories ([facility] 11/8/2016    Multiple thyroid nodules 2/18/2016    Nocturia 5/7/2015    Obesity     Obstructive sleep apnea     Primary osteoarthritis of left knee 11/8/2016    Primary osteoarthritis of right knee 11/8/2016    Proteinuria 9/27/2010    Pul[facility]sema ([email or web address] 2/8/2017    Pulmonary nodules 2/4/2016    Shortness of breath 2/4/2016    Type 2 diabetes mellitus with diabetic chronic kidney disease ([email or web address] 11/3/2015    Type 2 diabetes mellitus with stage 3 chronic kidney disease ([facility] 2/4/2016    Type 2 diabetes mellitus with stage 3 chronic kidney disease, without long-term current use of insulin ([facility] 8/5/2016    Vitamin D deficiency 8/21/2017       Social History:   TOBACCO:   reports that he has quit smoking. His smoking use included cigarettes. He has never used smokeless tobacco.  ETOH:   reports that he does not drink alcohol. Current Medications:    Prior to Admission medications    Medication Sig Start Date End Date Taking?  Authorizing Provider   levofloxacin (LEVAQUIN) 500 MG tablet Take 1 tablet by mouth daily for 10 days 5/7/19 5/17/19 Yes Lauren Elizabeth MD   insulin glargine (LANTUS SOLOSTAR) 100 UNIT/ML injection pen Inject 10 Units into the skin nightly 5/7/19  Yes Lauren Elizabeth MD   potassium chloride (KLOR-CON M) 20 MEQ extended release tablet Take 1 tablet by mouth daily 1/21/19  Yes Lauren Elizabeth MD   ferrous sulfate 325 (65 Fe) MG tablet Take 1 tablet by mouth every other day 1/20/19  Yes Lauren Elizabeth MD   levothyroxine (LEVOTHROID) 25 MCG tablet Take 1 tablet by mouth daily 1/16/19  Yes Lauren Elizabeth MD   torsemide (DEMADEX) 100 MG tablet Take 1 tablet (100 mg) in the morning and 1/2 tablet (50 mg) in the late afternoon 1/16/19  Yes Lauren Elizabeth MD   carvedilol (COREG) 25 MG tablet Take 1 tablet by mouth 2 times daily 11/27/18  Yes [facility]. Physical Exam:      Vitals: /60   Pulse 68   SpO2 96%     There is no height or weight on file to calculate BMI. Wt Readings from Last 3 Encounters:   01/16/19 298 lb (135.2 kg)   11/06/18 280 lb (127 kg)   10/16/18 290 lb (131.5 kg)     Physical Exam   Constitutional: He is oriented to person, place, and time. He appears well-developed and well-nourished. No distress. HENT:   Head: Normocephalic and atraumatic. Mouth/Throat: Oropharynx is clear and moist. No oropharyngeal exudate. Eyes: Conjunctivae and EOM are normal. Right eye exhibits no discharge. Left eye exhibits no discharge. No scleral icterus. Neck: Normal range of motion. Neck supple. Cardiovascular: Normal rate and normal heart sounds. No murmur heard. Pulmonary/Chest: Effort normal and breath sounds normal. No respiratory distress. He has no wheezes. He has no rales. Abdominal: Soft. He exhibits no distension and no mass. There is no tenderness. There is no guarding. Buttocks: L lower side with mild swelling/ tenderness    Genitourinary:   Genitourinary Comments: R testicular tenderness and swelling/  Mass. Musculoskeletal: He exhibits no edema or deformity. Lymphadenopathy:        Head (right side): No submental and no submandibular adenopathy present. Head (left side): No submental and no submandibular adenopathy present. He has no cervical adenopathy. Right cervical: No superficial cervical and no deep cervical adenopathy present. Left cervical: No superficial cervical and no deep cervical adenopathy present. Neurological: He is alert and oriented to person, place, and time. He has normal reflexes. He exhibits normal muscle tone. GCS eye subscore is 4. GCS verbal subscore is 5. GCS motor subscore is 6. Skin: No rash noted. He is not diaphoretic. Psychiatric: He has a normal mood and affect.  His behavior is normal. Thought content normal.          Assessment/Plan: difficulties. Additional patients instructions (if given): There are no Patient Instructions on file for this visit.     German Alston M.D.   5/7/2019, 7:23 PM

## 2019-05-08 LAB
A/G RATIO: 0.9 (ref 1.1–2.2)
ALBUMIN SERPL-MCNC: 3.6 G/DL (ref 3.4–5)
ALP BLD-CCNC: 67 U/L (ref 40–129)
ALT SERPL-CCNC: 15 U/L (ref 10–40)
ANION GAP SERPL CALCULATED.3IONS-SCNC: 12 MMOL/L (ref 3–16)
AST SERPL-CCNC: 16 U/L (ref 15–37)
BILIRUB SERPL-MCNC: 0.5 MG/DL (ref 0–1)
BUN BLDV-MCNC: 24 MG/DL (ref 7–20)
CALCIUM SERPL-MCNC: 10 MG/DL (ref 8.3–10.6)
CHLORIDE BLD-SCNC: 96 MMOL/L (ref 99–110)
CO2: 31 MMOL/L (ref 21–32)
CREAT SERPL-MCNC: 1.9 MG/DL (ref 0.8–1.3)
GFR AFRICAN AMERICAN: 41
GFR NON-AFRICAN AMERICAN: 34
GLOBULIN: 3.8 G/DL
GLUCOSE BLD-MCNC: 103 MG/DL (ref 70–99)
POTASSIUM SERPL-SCNC: 4.9 MMOL/L (ref 3.5–5.1)
SODIUM BLD-SCNC: 139 MMOL/L (ref 136–145)
TOTAL PROTEIN: 7.4 G/DL (ref 6.4–8.2)

## 2019-07-24 ENCOUNTER — OFFICE VISIT (OUTPATIENT)
Dept: INTERNAL MEDICINE CLINIC | Age: 84
End: 2019-07-24
Payer: MEDICARE

## 2019-07-24 VITALS
OXYGEN SATURATION: 96 % | DIASTOLIC BLOOD PRESSURE: 86 MMHG | SYSTOLIC BLOOD PRESSURE: 155 MMHG | BODY MASS INDEX: 47.3 KG/M2 | WEIGHT: 302 LBS | HEART RATE: 51 BPM

## 2019-07-24 DIAGNOSIS — E66.9 DIABETES MELLITUS TYPE 2 IN OBESE (HCC): Primary | Chronic | ICD-10-CM

## 2019-07-24 DIAGNOSIS — E03.9 HYPOTHYROIDISM, UNSPECIFIED TYPE: ICD-10-CM

## 2019-07-24 DIAGNOSIS — E78.5 HYPERLIPIDEMIA, UNSPECIFIED HYPERLIPIDEMIA TYPE: Chronic | ICD-10-CM

## 2019-07-24 DIAGNOSIS — I10 ESSENTIAL HYPERTENSION: Chronic | ICD-10-CM

## 2019-07-24 DIAGNOSIS — E11.69 DIABETES MELLITUS TYPE 2 IN OBESE (HCC): Primary | Chronic | ICD-10-CM

## 2019-07-24 DIAGNOSIS — E11.69 DIABETES MELLITUS TYPE 2 IN OBESE (HCC): Chronic | ICD-10-CM

## 2019-07-24 DIAGNOSIS — E66.9 DIABETES MELLITUS TYPE 2 IN OBESE (HCC): Chronic | ICD-10-CM

## 2019-07-24 DIAGNOSIS — E66.01 MORBID OBESITY DUE TO EXCESS CALORIES (HCC): Chronic | ICD-10-CM

## 2019-07-24 LAB
A/G RATIO: 1.4 (ref 1.1–2.2)
ALBUMIN SERPL-MCNC: 4.4 G/DL (ref 3.4–5)
ALP BLD-CCNC: 68 U/L (ref 40–129)
ALT SERPL-CCNC: 9 U/L (ref 10–40)
ANION GAP SERPL CALCULATED.3IONS-SCNC: 15 MMOL/L (ref 3–16)
AST SERPL-CCNC: 16 U/L (ref 15–37)
BASOPHILS ABSOLUTE: 0 K/UL (ref 0–0.2)
BASOPHILS RELATIVE PERCENT: 0.3 %
BILIRUB SERPL-MCNC: 0.4 MG/DL (ref 0–1)
BUN BLDV-MCNC: 26 MG/DL (ref 7–20)
CALCIUM SERPL-MCNC: 11 MG/DL (ref 8.3–10.6)
CHLORIDE BLD-SCNC: 96 MMOL/L (ref 99–110)
CHOLESTEROL, TOTAL: 149 MG/DL (ref 0–199)
CO2: 32 MMOL/L (ref 21–32)
CREAT SERPL-MCNC: 1.9 MG/DL (ref 0.8–1.3)
EOSINOPHILS ABSOLUTE: 0.3 K/UL (ref 0–0.6)
EOSINOPHILS RELATIVE PERCENT: 5.2 %
GFR AFRICAN AMERICAN: 41
GFR NON-AFRICAN AMERICAN: 34
GLOBULIN: 3.1 G/DL
GLUCOSE BLD-MCNC: 168 MG/DL (ref 70–99)
HCT VFR BLD CALC: 42.6 % (ref 40.5–52.5)
HDLC SERPL-MCNC: 42 MG/DL (ref 40–60)
HEMOGLOBIN: 13.7 G/DL (ref 13.5–17.5)
LDL CHOLESTEROL CALCULATED: 85 MG/DL
LYMPHOCYTES ABSOLUTE: 1.9 K/UL (ref 1–5.1)
LYMPHOCYTES RELATIVE PERCENT: 29.8 %
MCH RBC QN AUTO: 30.7 PG (ref 26–34)
MCHC RBC AUTO-ENTMCNC: 32.1 G/DL (ref 31–36)
MCV RBC AUTO: 95.6 FL (ref 80–100)
MONOCYTES ABSOLUTE: 0.5 K/UL (ref 0–1.3)
MONOCYTES RELATIVE PERCENT: 8 %
NEUTROPHILS ABSOLUTE: 3.6 K/UL (ref 1.7–7.7)
NEUTROPHILS RELATIVE PERCENT: 56.7 %
PDW BLD-RTO: 15.6 % (ref 12.4–15.4)
PLATELET # BLD: 173 K/UL (ref 135–450)
PMV BLD AUTO: 9.2 FL (ref 5–10.5)
POTASSIUM SERPL-SCNC: 4.1 MMOL/L (ref 3.5–5.1)
RBC # BLD: 4.45 M/UL (ref 4.2–5.9)
SODIUM BLD-SCNC: 143 MMOL/L (ref 136–145)
T4 FREE: 1.2 NG/DL (ref 0.9–1.8)
TOTAL PROTEIN: 7.5 G/DL (ref 6.4–8.2)
TRIGL SERPL-MCNC: 112 MG/DL (ref 0–150)
TSH REFLEX: 4.47 UIU/ML (ref 0.27–4.2)
VLDLC SERPL CALC-MCNC: 22 MG/DL
WBC # BLD: 6.3 K/UL (ref 4–11)

## 2019-07-24 PROCEDURE — 1036F TOBACCO NON-USER: CPT | Performed by: INTERNAL MEDICINE

## 2019-07-24 PROCEDURE — G8598 ASA/ANTIPLAT THER USED: HCPCS | Performed by: INTERNAL MEDICINE

## 2019-07-24 PROCEDURE — 1123F ACP DISCUSS/DSCN MKR DOCD: CPT | Performed by: INTERNAL MEDICINE

## 2019-07-24 PROCEDURE — 99214 OFFICE O/P EST MOD 30 MIN: CPT | Performed by: INTERNAL MEDICINE

## 2019-07-24 PROCEDURE — G8417 CALC BMI ABV UP PARAM F/U: HCPCS | Performed by: INTERNAL MEDICINE

## 2019-07-24 PROCEDURE — 4040F PNEUMOC VAC/ADMIN/RCVD: CPT | Performed by: INTERNAL MEDICINE

## 2019-07-24 PROCEDURE — G8427 DOCREV CUR MEDS BY ELIG CLIN: HCPCS | Performed by: INTERNAL MEDICINE

## 2019-07-24 RX ORDER — POTASSIUM CHLORIDE 20 MEQ/1
20 TABLET, EXTENDED RELEASE ORAL DAILY
Qty: 90 TABLET | Refills: 2 | Status: SHIPPED | OUTPATIENT
Start: 2019-07-24 | End: 2020-05-27

## 2019-07-24 ASSESSMENT — ENCOUNTER SYMPTOMS
VOMITING: 0
NAUSEA: 0
ABDOMINAL PAIN: 0
SHORTNESS OF BREATH: 0
CHEST TIGHTNESS: 0

## 2019-07-24 ASSESSMENT — PATIENT HEALTH QUESTIONNAIRE - PHQ9
SUM OF ALL RESPONSES TO PHQ QUESTIONS 1-9: 0
2. FEELING DOWN, DEPRESSED OR HOPELESS: 0
SUM OF ALL RESPONSES TO PHQ QUESTIONS 1-9: 0
1. LITTLE INTEREST OR PLEASURE IN DOING THINGS: 0
SUM OF ALL RESPONSES TO PHQ9 QUESTIONS 1 & 2: 0

## 2019-07-24 NOTE — PROGRESS NOTES
MD Kyung   torsemide (DEMADEX) 100 MG tablet Take 1 tablet (100 mg) in the morning and 1/2 tablet (50 mg) in the late afternoon 1/16/19  Yes Sveta Sutherland MD   carvedilol (COREG) 25 MG tablet Take 1 tablet by mouth 2 times daily 11/27/18  Yes Sveta Sutherland MD   Insulin Pen Needle 32G X 4 MM MISC Use daily as directed 11/14/18  Yes Sveta Sutherland MD   ACCCAROLINE-CHECAMI MEAGAN PLUS strip Check 3 times daily before meals 10/16/18  Yes Sveta Sutherland MD   ACCU-CHECAMI SOFTCLIX LANCETS MISC Check 3 times day before meals 10/16/18  Yes Sveta Sutherland MD   famotidine (PEPCID) 20 MG tablet Take 1 tablet by mouth 2 times daily 10/16/18  Yes Sveta Sutherland MD   tamsulosin (FLOMAX) 0.4 MG capsule Take 1 capsule by mouth nightly 10/16/18  Yes Sveta Sutherland MD   simvastatin (ZOCOR) 20 MG tablet Take 1 tablet by mouth nightly 10/16/18  Yes Sveta Sutherland MD   allopurinol (ZYLOPRIM) 100 MG tablet Take 2 tablets by mouth daily 7/16/18  Yes Sveta Sutherland MD   albuterol sulfate HFA (PROAIR HFA) 108 (90 Base) MCG/ACT inhaler Inhale 2 puffs into the lungs every 6 hours as needed for Wheezing or Shortness of Breath 7/6/17  Yes Terri Green MD   Umeclidinium Bromide (INCRUSE ELLIPTA) 62.5 MCG/INH AEPB Inhale 1 puff into the lungs daily 5/9/17  Yes Terri Green MD   aspirin EC 81 MG EC tablet Take 1 tablet by mouth daily. With food. 2/6/15  Yes Terri Green MD   Multiple Vitamin (MULTIVITAMIN PO) Take 1 tablet by mouth daily. 5/24/10  Yes Terri Green MD       REVIEW OF SYSTEMS:  Review of Systems   Constitutional: Negative for activity change, appetite change, chills, fever and unexpected weight change. Respiratory: Negative for chest tightness and shortness of breath. Cardiovascular: Negative for chest pain. Gastrointestinal: Negative for abdominal pain, nausea and vomiting. Genitourinary: Negative for hematuria. Skin: Negative for rash.

## 2019-07-25 LAB
ESTIMATED AVERAGE GLUCOSE: 182.9 MG/DL
HBA1C MFR BLD: 8 %

## 2019-08-10 DIAGNOSIS — M1A.9XX0 CHRONIC GOUT WITHOUT TOPHUS, UNSPECIFIED CAUSE, UNSPECIFIED SITE: Chronic | ICD-10-CM

## 2019-08-12 ENCOUNTER — TELEPHONE (OUTPATIENT)
Dept: INTERNAL MEDICINE CLINIC | Age: 84
End: 2019-08-12

## 2019-08-12 DIAGNOSIS — R60.0 LOCALIZED EDEMA: Chronic | ICD-10-CM

## 2019-08-12 RX ORDER — TORSEMIDE 100 MG/1
TABLET ORAL
Qty: 45 TABLET | Refills: 3 | Status: SHIPPED | OUTPATIENT
Start: 2019-08-12 | End: 2020-02-03 | Stop reason: SDUPTHER

## 2019-08-12 RX ORDER — ALLOPURINOL 100 MG/1
TABLET ORAL
Qty: 180 TABLET | Refills: 4 | Status: SHIPPED | OUTPATIENT
Start: 2019-08-12 | End: 2020-09-02

## 2019-10-28 ENCOUNTER — HOSPITAL ENCOUNTER (OUTPATIENT)
Dept: GENERAL RADIOLOGY | Age: 84
Discharge: HOME | End: 2019-10-28
Payer: MEDICARE

## 2019-10-28 ENCOUNTER — HOSPITAL ENCOUNTER (OUTPATIENT)
Age: 84
Discharge: HOME | End: 2019-10-28
Payer: MEDICARE

## 2019-10-28 ENCOUNTER — OFFICE VISIT (OUTPATIENT)
Dept: INTERNAL MEDICINE CLINIC | Age: 84
End: 2019-10-28
Payer: MEDICARE

## 2019-10-28 VITALS
BODY MASS INDEX: 47.14 KG/M2 | OXYGEN SATURATION: 90 % | HEART RATE: 85 BPM | DIASTOLIC BLOOD PRESSURE: 70 MMHG | WEIGHT: 301 LBS | SYSTOLIC BLOOD PRESSURE: 136 MMHG

## 2019-10-28 DIAGNOSIS — E66.9 DIABETES MELLITUS TYPE 2 IN OBESE (HCC): Primary | Chronic | ICD-10-CM

## 2019-10-28 DIAGNOSIS — R05.9 COUGH: ICD-10-CM

## 2019-10-28 DIAGNOSIS — N18.30 CHRONIC KIDNEY DISEASE, STAGE III (MODERATE) (HCC): Chronic | ICD-10-CM

## 2019-10-28 DIAGNOSIS — Z23 NEED FOR INFLUENZA VACCINATION: ICD-10-CM

## 2019-10-28 DIAGNOSIS — E11.69 DIABETES MELLITUS TYPE 2 IN OBESE (HCC): Chronic | ICD-10-CM

## 2019-10-28 DIAGNOSIS — E78.5 HYPERLIPIDEMIA, UNSPECIFIED HYPERLIPIDEMIA TYPE: Chronic | ICD-10-CM

## 2019-10-28 DIAGNOSIS — D64.9 ANEMIA, UNSPECIFIED TYPE: Chronic | ICD-10-CM

## 2019-10-28 DIAGNOSIS — E66.9 DIABETES MELLITUS TYPE 2 IN OBESE (HCC): Chronic | ICD-10-CM

## 2019-10-28 DIAGNOSIS — I10 ESSENTIAL HYPERTENSION: Chronic | ICD-10-CM

## 2019-10-28 DIAGNOSIS — R60.0 BILATERAL LEG EDEMA: ICD-10-CM

## 2019-10-28 DIAGNOSIS — E66.01 MORBID OBESITY DUE TO EXCESS CALORIES (HCC): Chronic | ICD-10-CM

## 2019-10-28 DIAGNOSIS — E11.69 DIABETES MELLITUS TYPE 2 IN OBESE (HCC): Primary | Chronic | ICD-10-CM

## 2019-10-28 LAB
A/G RATIO: 1.9 (ref 1.1–2.2)
ALBUMIN SERPL-MCNC: 5 G/DL (ref 3.4–5)
ALP BLD-CCNC: 63 U/L (ref 40–129)
ALT SERPL-CCNC: 10 U/L (ref 10–40)
ANION GAP SERPL CALCULATED.3IONS-SCNC: 14 MMOL/L (ref 3–16)
AST SERPL-CCNC: 15 U/L (ref 15–37)
BASOPHILS ABSOLUTE: 0 K/UL (ref 0–0.2)
BASOPHILS RELATIVE PERCENT: 0.5 %
BILIRUB SERPL-MCNC: 0.5 MG/DL (ref 0–1)
BUN BLDV-MCNC: 26 MG/DL (ref 7–20)
CALCIUM SERPL-MCNC: 10.6 MG/DL (ref 8.3–10.6)
CHLORIDE BLD-SCNC: 99 MMOL/L (ref 99–110)
CO2: 33 MMOL/L (ref 21–32)
CREAT SERPL-MCNC: 1.7 MG/DL (ref 0.8–1.3)
EOSINOPHILS ABSOLUTE: 0.3 K/UL (ref 0–0.6)
EOSINOPHILS RELATIVE PERCENT: 5.2 %
GFR AFRICAN AMERICAN: 47
GFR NON-AFRICAN AMERICAN: 38
GLOBULIN: 2.6 G/DL
GLUCOSE BLD-MCNC: 96 MG/DL (ref 70–99)
HCT VFR BLD CALC: 42.9 % (ref 40.5–52.5)
HEMOGLOBIN: 13.9 G/DL (ref 13.5–17.5)
LYMPHOCYTES ABSOLUTE: 1.5 K/UL (ref 1–5.1)
LYMPHOCYTES RELATIVE PERCENT: 28.7 %
MCH RBC QN AUTO: 30.8 PG (ref 26–34)
MCHC RBC AUTO-ENTMCNC: 32.4 G/DL (ref 31–36)
MCV RBC AUTO: 94.9 FL (ref 80–100)
MONOCYTES ABSOLUTE: 0.5 K/UL (ref 0–1.3)
MONOCYTES RELATIVE PERCENT: 9.3 %
NEUTROPHILS ABSOLUTE: 2.9 K/UL (ref 1.7–7.7)
NEUTROPHILS RELATIVE PERCENT: 56.3 %
PDW BLD-RTO: 15.3 % (ref 12.4–15.4)
PLATELET # BLD: 141 K/UL (ref 135–450)
PMV BLD AUTO: 9.2 FL (ref 5–10.5)
POTASSIUM SERPL-SCNC: 4.3 MMOL/L (ref 3.5–5.1)
RBC # BLD: 4.52 M/UL (ref 4.2–5.9)
SODIUM BLD-SCNC: 146 MMOL/L (ref 136–145)
TOTAL PROTEIN: 7.6 G/DL (ref 6.4–8.2)
WBC # BLD: 5.2 K/UL (ref 4–11)

## 2019-10-28 PROCEDURE — G8482 FLU IMMUNIZE ORDER/ADMIN: HCPCS | Performed by: INTERNAL MEDICINE

## 2019-10-28 PROCEDURE — 1036F TOBACCO NON-USER: CPT | Performed by: INTERNAL MEDICINE

## 2019-10-28 PROCEDURE — G8598 ASA/ANTIPLAT THER USED: HCPCS | Performed by: INTERNAL MEDICINE

## 2019-10-28 PROCEDURE — 4040F PNEUMOC VAC/ADMIN/RCVD: CPT | Performed by: INTERNAL MEDICINE

## 2019-10-28 PROCEDURE — 90653 IIV ADJUVANT VACCINE IM: CPT | Performed by: INTERNAL MEDICINE

## 2019-10-28 PROCEDURE — 99214 OFFICE O/P EST MOD 30 MIN: CPT | Performed by: INTERNAL MEDICINE

## 2019-10-28 PROCEDURE — G0008 ADMIN INFLUENZA VIRUS VAC: HCPCS | Performed by: INTERNAL MEDICINE

## 2019-10-28 PROCEDURE — 1123F ACP DISCUSS/DSCN MKR DOCD: CPT | Performed by: INTERNAL MEDICINE

## 2019-10-28 PROCEDURE — G8417 CALC BMI ABV UP PARAM F/U: HCPCS | Performed by: INTERNAL MEDICINE

## 2019-10-28 PROCEDURE — 71046 X-RAY EXAM CHEST 2 VIEWS: CPT

## 2019-10-28 PROCEDURE — G8427 DOCREV CUR MEDS BY ELIG CLIN: HCPCS | Performed by: INTERNAL MEDICINE

## 2019-10-28 ASSESSMENT — ENCOUNTER SYMPTOMS
SHORTNESS OF BREATH: 0
CHEST TIGHTNESS: 0
NAUSEA: 0
BLOOD IN STOOL: 0
VOMITING: 0
COUGH: 1
ABDOMINAL PAIN: 0

## 2019-10-29 LAB
ESTIMATED AVERAGE GLUCOSE: 171.4 MG/DL
HBA1C MFR BLD: 7.6 %

## 2019-11-26 ENCOUNTER — TELEPHONE (OUTPATIENT)
Dept: INTERNAL MEDICINE CLINIC | Age: 84
End: 2019-11-26

## 2019-12-02 ENCOUNTER — TELEPHONE (OUTPATIENT)
Dept: INTERNAL MEDICINE CLINIC | Age: 84
End: 2019-12-02

## 2019-12-20 DIAGNOSIS — I10 ESSENTIAL HYPERTENSION: Chronic | ICD-10-CM

## 2019-12-20 DIAGNOSIS — N40.1 BENIGN NON-NODULAR PROSTATIC HYPERPLASIA WITH LOWER URINARY TRACT SYMPTOMS: Chronic | ICD-10-CM

## 2019-12-20 RX ORDER — CARVEDILOL 25 MG/1
TABLET ORAL
Qty: 180 TABLET | Refills: 2 | Status: SHIPPED | OUTPATIENT
Start: 2019-12-20 | End: 2020-09-02

## 2019-12-20 RX ORDER — FAMOTIDINE 20 MG/1
TABLET, FILM COATED ORAL
Qty: 180 TABLET | Refills: 2 | Status: SHIPPED | OUTPATIENT
Start: 2019-12-20 | End: 2020-09-02

## 2019-12-20 RX ORDER — TAMSULOSIN HYDROCHLORIDE 0.4 MG/1
CAPSULE ORAL
Qty: 90 CAPSULE | Refills: 2 | Status: SHIPPED | OUTPATIENT
Start: 2019-12-20 | End: 2020-09-02

## 2020-01-07 RX ORDER — SIMVASTATIN 20 MG
TABLET ORAL
Qty: 90 TABLET | Refills: 2 | Status: SHIPPED | OUTPATIENT
Start: 2020-01-07 | End: 2020-09-02

## 2020-02-03 ENCOUNTER — OFFICE VISIT (OUTPATIENT)
Dept: INTERNAL MEDICINE CLINIC | Age: 85
End: 2020-02-03
Payer: MEDICARE

## 2020-02-03 VITALS
WEIGHT: 279 LBS | HEIGHT: 66 IN | SYSTOLIC BLOOD PRESSURE: 130 MMHG | DIASTOLIC BLOOD PRESSURE: 70 MMHG | BODY MASS INDEX: 44.84 KG/M2

## 2020-02-03 DIAGNOSIS — E11.69 DIABETES MELLITUS TYPE 2 IN OBESE (HCC): Chronic | ICD-10-CM

## 2020-02-03 DIAGNOSIS — I10 ESSENTIAL HYPERTENSION: Chronic | ICD-10-CM

## 2020-02-03 DIAGNOSIS — E66.9 DIABETES MELLITUS TYPE 2 IN OBESE (HCC): Chronic | ICD-10-CM

## 2020-02-03 DIAGNOSIS — R60.0 LOCALIZED EDEMA: Chronic | ICD-10-CM

## 2020-02-03 DIAGNOSIS — E78.5 HYPERLIPIDEMIA, UNSPECIFIED HYPERLIPIDEMIA TYPE: Chronic | ICD-10-CM

## 2020-02-03 DIAGNOSIS — N18.30 CHRONIC KIDNEY DISEASE, STAGE III (MODERATE) (HCC): Chronic | ICD-10-CM

## 2020-02-03 LAB
A/G RATIO: 1.2 (ref 1.1–2.2)
ALBUMIN SERPL-MCNC: 4 G/DL (ref 3.4–5)
ALP BLD-CCNC: 61 U/L (ref 40–129)
ALT SERPL-CCNC: 11 U/L (ref 10–40)
ANION GAP SERPL CALCULATED.3IONS-SCNC: 11 MMOL/L (ref 3–16)
AST SERPL-CCNC: 18 U/L (ref 15–37)
BASOPHILS ABSOLUTE: 0 K/UL (ref 0–0.2)
BASOPHILS RELATIVE PERCENT: 0.6 %
BILIRUB SERPL-MCNC: 0.5 MG/DL (ref 0–1)
BUN BLDV-MCNC: 20 MG/DL (ref 7–20)
CALCIUM SERPL-MCNC: 11.2 MG/DL (ref 8.3–10.6)
CHLORIDE BLD-SCNC: 96 MMOL/L (ref 99–110)
CHOLESTEROL, TOTAL: 143 MG/DL (ref 0–199)
CO2: 34 MMOL/L (ref 21–32)
CREAT SERPL-MCNC: 1.6 MG/DL (ref 0.8–1.3)
EOSINOPHILS ABSOLUTE: 0.3 K/UL (ref 0–0.6)
EOSINOPHILS RELATIVE PERCENT: 5.6 %
GFR AFRICAN AMERICAN: 50
GFR NON-AFRICAN AMERICAN: 41
GLOBULIN: 3.4 G/DL
GLUCOSE BLD-MCNC: 92 MG/DL (ref 70–99)
HCT VFR BLD CALC: 42.8 % (ref 40.5–52.5)
HDLC SERPL-MCNC: 37 MG/DL (ref 40–60)
HEMOGLOBIN: 13.7 G/DL (ref 13.5–17.5)
LDL CHOLESTEROL CALCULATED: 83 MG/DL
LYMPHOCYTES ABSOLUTE: 1.7 K/UL (ref 1–5.1)
LYMPHOCYTES RELATIVE PERCENT: 33.5 %
MCH RBC QN AUTO: 29.7 PG (ref 26–34)
MCHC RBC AUTO-ENTMCNC: 31.9 G/DL (ref 31–36)
MCV RBC AUTO: 93.1 FL (ref 80–100)
MONOCYTES ABSOLUTE: 0.5 K/UL (ref 0–1.3)
MONOCYTES RELATIVE PERCENT: 9.7 %
NEUTROPHILS ABSOLUTE: 2.6 K/UL (ref 1.7–7.7)
NEUTROPHILS RELATIVE PERCENT: 50.6 %
PDW BLD-RTO: 15.3 % (ref 12.4–15.4)
PLATELET # BLD: 153 K/UL (ref 135–450)
PMV BLD AUTO: 9.4 FL (ref 5–10.5)
POTASSIUM SERPL-SCNC: 4.4 MMOL/L (ref 3.5–5.1)
RBC # BLD: 4.6 M/UL (ref 4.2–5.9)
SODIUM BLD-SCNC: 141 MMOL/L (ref 136–145)
TOTAL PROTEIN: 7.4 G/DL (ref 6.4–8.2)
TRIGL SERPL-MCNC: 115 MG/DL (ref 0–150)
VLDLC SERPL CALC-MCNC: 23 MG/DL
WBC # BLD: 5.1 K/UL (ref 4–11)

## 2020-02-03 PROCEDURE — G8482 FLU IMMUNIZE ORDER/ADMIN: HCPCS | Performed by: INTERNAL MEDICINE

## 2020-02-03 PROCEDURE — 99214 OFFICE O/P EST MOD 30 MIN: CPT | Performed by: INTERNAL MEDICINE

## 2020-02-03 PROCEDURE — 4040F PNEUMOC VAC/ADMIN/RCVD: CPT | Performed by: INTERNAL MEDICINE

## 2020-02-03 PROCEDURE — G8417 CALC BMI ABV UP PARAM F/U: HCPCS | Performed by: INTERNAL MEDICINE

## 2020-02-03 PROCEDURE — 1036F TOBACCO NON-USER: CPT | Performed by: INTERNAL MEDICINE

## 2020-02-03 PROCEDURE — 1123F ACP DISCUSS/DSCN MKR DOCD: CPT | Performed by: INTERNAL MEDICINE

## 2020-02-03 PROCEDURE — G8427 DOCREV CUR MEDS BY ELIG CLIN: HCPCS | Performed by: INTERNAL MEDICINE

## 2020-02-03 RX ORDER — TORSEMIDE 100 MG/1
TABLET ORAL
Qty: 135 TABLET | Refills: 3 | Status: SHIPPED | OUTPATIENT
Start: 2020-02-03 | End: 2020-09-24 | Stop reason: SDUPTHER

## 2020-02-03 SDOH — ECONOMIC STABILITY: TRANSPORTATION INSECURITY
IN THE PAST 12 MONTHS, HAS THE LACK OF TRANSPORTATION KEPT YOU FROM MEDICAL APPOINTMENTS OR FROM GETTING MEDICATIONS?: NO

## 2020-02-03 SDOH — ECONOMIC STABILITY: TRANSPORTATION INSECURITY
IN THE PAST 12 MONTHS, HAS LACK OF TRANSPORTATION KEPT YOU FROM MEETINGS, WORK, OR FROM GETTING THINGS NEEDED FOR DAILY LIVING?: NO

## 2020-02-03 SDOH — ECONOMIC STABILITY: FOOD INSECURITY: WITHIN THE PAST 12 MONTHS, YOU WORRIED THAT YOUR FOOD WOULD RUN OUT BEFORE YOU GOT MONEY TO BUY MORE.: NEVER TRUE

## 2020-02-03 SDOH — ECONOMIC STABILITY: FOOD INSECURITY: WITHIN THE PAST 12 MONTHS, THE FOOD YOU BOUGHT JUST DIDN'T LAST AND YOU DIDN'T HAVE MONEY TO GET MORE.: NEVER TRUE

## 2020-02-03 SDOH — ECONOMIC STABILITY: INCOME INSECURITY: HOW HARD IS IT FOR YOU TO PAY FOR THE VERY BASICS LIKE FOOD, HOUSING, MEDICAL CARE, AND HEATING?: NOT HARD AT ALL

## 2020-02-03 ASSESSMENT — ENCOUNTER SYMPTOMS
BLOOD IN STOOL: 0
SHORTNESS OF BREATH: 0
CONSTIPATION: 0
COUGH: 0
VOMITING: 0
DIARRHEA: 0
CHEST TIGHTNESS: 0
NAUSEA: 0
ABDOMINAL PAIN: 0

## 2020-02-03 ASSESSMENT — PATIENT HEALTH QUESTIONNAIRE - PHQ9
2. FEELING DOWN, DEPRESSED OR HOPELESS: 0
SUM OF ALL RESPONSES TO PHQ9 QUESTIONS 1 & 2: 0
SUM OF ALL RESPONSES TO PHQ QUESTIONS 1-9: 0
1. LITTLE INTEREST OR PLEASURE IN DOING THINGS: 0
SUM OF ALL RESPONSES TO PHQ QUESTIONS 1-9: 0

## 2020-02-03 NOTE — PROGRESS NOTES
Outpatient Note for established Patient - regular Visit    History Obtained From:  patient, electronic medical record  Is the patient new to me ? - No    HISTORY OF PRESENT ILLNESS:   The patient is a 80 y.o. male who is here today for :  Diagnoses and all orders for this visit:    Diabetes mellitus type 2 in obese [facility])  Lab Results   Component Value Date    LABA1C 6.9 02/03/2020     Lab Results   Component Value Date    .3 02/03/2020   FBG -  mg%  No hypoglycemia events   He is on Lantus  Injections     Localized edema    Essential hypertension  BP is 130/70  He is on Coreg and Torsemide  BP at home:     Chronic kidney disease, stage III (moderate) ([facility])  Cr was stable 1.7    Hyperlipidemia, unspecified hyperlipidemia type  Lab Results   Component Value Date    CHOL 143 02/03/2020    CHOL 149 07/24/2019    CHOL 170 07/16/2018     Lab Results   Component Value Date    TRIG 115 02/03/2020    TRIG 112 07/24/2019    TRIG 198 (H) 07/16/2018     Lab Results   Component Value Date    HDL 37 (L) 02/03/2020    HDL 42 07/24/2019    HDL 44 07/16/2018     Lab Results   Component Value Date    LDLCALC 83 02/03/2020    [facility] 85 07/24/2019    LDLCALC 86 07/16/2018     Lab Results   Component Value Date    LABVLDL 23 02/03/2020    LABVLDL 22 07/24/2019    LABVLDL 40 07/16/2018   he is on Simvastatin  No reported side effects    Pt denies CP/SOB/palpitations/abdominal pain/N/V. He is followed by Dr Arturo Waters urologist - he has an appointment   Cough has resolved.    No hemoptysis     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    Atelectasis of left lung 2/18/2016    Bell's palsy 7/24/2015    Left sided - July 2015    Benign non-nodular prostatic hyperplasia with lower urinary tract symptoms 11/3/2015    BPH (benign prostatic hypertrophy) 7/5/2011    Chronic gout without tophus 11/3/2015    Chronic kidney disease, stage III Musculoskeletal:         General: Swelling (b/l mild pitting edema +2 and venous stasis skin changes. neg Christy/ Cord sign ) present. No deformity. Lymphadenopathy:      Head:      Right side of head: No submental or submandibular adenopathy. Left side of head: No submental or submandibular adenopathy. Cervical: No cervical adenopathy. Right cervical: No superficial or deep cervical adenopathy. Left cervical: No superficial or deep cervical adenopathy. Skin:     Findings: No rash. Neurological:      Mental Status: He is alert and oriented to person, place, and time. GCS: GCS eye subscore is 4. GCS verbal subscore is 5. GCS motor subscore is 6. Motor: No weakness or abnormal muscle tone. Deep Tendon Reflexes: Reflexes are normal and symmetric. Psychiatric:         Behavior: Behavior normal.         Thought Content: Thought content normal.            Assessment/Plan:   Diagnoses and all orders for this visit:    Diabetes mellitus type 2 in obese ([address]  Well controlled- no changes in medication today. Check A1c is to verify that diabetes is controlled no change in medications  -     CBC Auto Differential; Future  -     Comprehensive Metabolic Panel; Future  -     Hemoglobin A1C; Future  -     Lipid Panel; Future    Localized edema  Leg edema is better according to patient to my physical examination no medication changes  -     torsemide (DEMADEX) 100 MG tablet; Take 1 tablet (100 mg) in the morning and 1/2 tablet (50 mg) in the late afternoon  -     CBC Auto Differential; Future  -     Comprehensive Metabolic Panel; Future    Essential hypertension  Well controlled- no changes in medication today. Monitor BP   -     CBC Auto Differential; Future  -     Comprehensive Metabolic Panel; Future  -     Lipid Panel; Future    Chronic kidney disease, stage III (moderate) (HCC)  Kidney function is stable-no changes in medication today.   We will recheck with the    -     CBC Auto Differential; Future  -     Comprehensive Metabolic Panel; Future    Hyperlipidemia, unspecified hyperlipidemia type  -     Lipid Panel; Future        - Patient was encouraged to call the office (and ask to see me) or be seen by other provider for any worsening or lack of improvement of the symptoms within a few days / weeks. - Pt was asked toschedule an appointment in 3 months, and to let me know of any scheduling difficulties. Additional patients instructions (if given): There are no Patient Instructions on file for this visit.     Eric Mendes M.D.   3/21/2020, 7:28 AM

## 2020-02-04 LAB
ESTIMATED AVERAGE GLUCOSE: 151.3 MG/DL
HBA1C MFR BLD: 6.9 %

## 2020-02-18 RX ORDER — TORSEMIDE 100 MG/1
TABLET ORAL
Qty: 90 TABLET | Refills: 2 | OUTPATIENT
Start: 2020-02-18

## 2020-04-24 ENCOUNTER — TELEPHONE (OUTPATIENT)
Dept: OTHER | Facility: CLINIC | Age: 85
End: 2020-04-24

## 2020-05-27 RX ORDER — BLOOD SUGAR DIAGNOSTIC
STRIP MISCELLANEOUS
Qty: 100 STRIP | Refills: 1 | Status: SHIPPED | OUTPATIENT
Start: 2020-05-27 | End: 2020-12-18 | Stop reason: SDUPTHER

## 2020-05-27 RX ORDER — POTASSIUM CHLORIDE 20 MEQ/1
TABLET, EXTENDED RELEASE ORAL
Qty: 90 TABLET | Refills: 1 | Status: SHIPPED | OUTPATIENT
Start: 2020-05-27 | End: 2020-09-24 | Stop reason: SDUPTHER

## 2020-06-23 ENCOUNTER — OFFICE VISIT (OUTPATIENT)
Dept: INTERNAL MEDICINE CLINIC | Age: 85
End: 2020-06-23
Payer: MEDICARE

## 2020-06-23 VITALS
TEMPERATURE: 98.6 F | HEIGHT: 66 IN | BODY MASS INDEX: 45.03 KG/M2 | DIASTOLIC BLOOD PRESSURE: 80 MMHG | SYSTOLIC BLOOD PRESSURE: 118 MMHG

## 2020-06-23 DIAGNOSIS — E66.9 DIABETES MELLITUS TYPE 2 IN OBESE (HCC): Chronic | ICD-10-CM

## 2020-06-23 DIAGNOSIS — E83.52 HYPERCALCEMIA: ICD-10-CM

## 2020-06-23 DIAGNOSIS — D64.9 ANEMIA, UNSPECIFIED TYPE: Chronic | ICD-10-CM

## 2020-06-23 DIAGNOSIS — N18.30 CHRONIC KIDNEY DISEASE, STAGE III (MODERATE) (HCC): Chronic | ICD-10-CM

## 2020-06-23 DIAGNOSIS — E78.5 HYPERLIPIDEMIA, UNSPECIFIED HYPERLIPIDEMIA TYPE: Chronic | ICD-10-CM

## 2020-06-23 DIAGNOSIS — E04.2 MULTIPLE THYROID NODULES: Chronic | ICD-10-CM

## 2020-06-23 DIAGNOSIS — I10 ESSENTIAL HYPERTENSION: Chronic | ICD-10-CM

## 2020-06-23 DIAGNOSIS — E11.69 DIABETES MELLITUS TYPE 2 IN OBESE (HCC): Chronic | ICD-10-CM

## 2020-06-23 LAB
A/G RATIO: 1.4 (ref 1.1–2.2)
ALBUMIN SERPL-MCNC: 4.1 G/DL (ref 3.4–5)
ALP BLD-CCNC: 65 U/L (ref 40–129)
ALT SERPL-CCNC: 9 U/L (ref 10–40)
ANION GAP SERPL CALCULATED.3IONS-SCNC: 10 MMOL/L (ref 3–16)
AST SERPL-CCNC: 14 U/L (ref 15–37)
BASOPHILS ABSOLUTE: 0 K/UL (ref 0–0.2)
BASOPHILS RELATIVE PERCENT: 0.3 %
BILIRUB SERPL-MCNC: 0.3 MG/DL (ref 0–1)
BUN BLDV-MCNC: 27 MG/DL (ref 7–20)
CALCIUM SERPL-MCNC: 10.4 MG/DL (ref 8.3–10.6)
CHLORIDE BLD-SCNC: 100 MMOL/L (ref 99–110)
CHOLESTEROL, TOTAL: 129 MG/DL (ref 0–199)
CO2: 34 MMOL/L (ref 21–32)
CREAT SERPL-MCNC: 1.8 MG/DL (ref 0.8–1.3)
EOSINOPHILS ABSOLUTE: 0.3 K/UL (ref 0–0.6)
EOSINOPHILS RELATIVE PERCENT: 7 %
GFR AFRICAN AMERICAN: 43
GFR NON-AFRICAN AMERICAN: 36
GLOBULIN: 3 G/DL
GLUCOSE BLD-MCNC: 104 MG/DL (ref 70–99)
HCT VFR BLD CALC: 36.8 % (ref 40.5–52.5)
HDLC SERPL-MCNC: 36 MG/DL (ref 40–60)
HEMOGLOBIN: 12 G/DL (ref 13.5–17.5)
LDL CHOLESTEROL CALCULATED: 74 MG/DL
LYMPHOCYTES ABSOLUTE: 1.4 K/UL (ref 1–5.1)
LYMPHOCYTES RELATIVE PERCENT: 34.3 %
MCH RBC QN AUTO: 30.8 PG (ref 26–34)
MCHC RBC AUTO-ENTMCNC: 32.5 G/DL (ref 31–36)
MCV RBC AUTO: 95 FL (ref 80–100)
MONOCYTES ABSOLUTE: 0.4 K/UL (ref 0–1.3)
MONOCYTES RELATIVE PERCENT: 9.6 %
NEUTROPHILS ABSOLUTE: 2 K/UL (ref 1.7–7.7)
NEUTROPHILS RELATIVE PERCENT: 48.8 %
PARATHYROID HORMONE INTACT: 275.4 PG/ML (ref 14–72)
PDW BLD-RTO: 15.9 % (ref 12.4–15.4)
PLATELET # BLD: 152 K/UL (ref 135–450)
PMV BLD AUTO: 9.8 FL (ref 5–10.5)
POTASSIUM SERPL-SCNC: 3.9 MMOL/L (ref 3.5–5.1)
RBC # BLD: 3.88 M/UL (ref 4.2–5.9)
SODIUM BLD-SCNC: 144 MMOL/L (ref 136–145)
TOTAL PROTEIN: 7.1 G/DL (ref 6.4–8.2)
TRIGL SERPL-MCNC: 94 MG/DL (ref 0–150)
TSH REFLEX: 6.7 UIU/ML (ref 0.27–4.2)
VLDLC SERPL CALC-MCNC: 19 MG/DL
WBC # BLD: 4.2 K/UL (ref 4–11)

## 2020-06-23 PROCEDURE — G8427 DOCREV CUR MEDS BY ELIG CLIN: HCPCS | Performed by: INTERNAL MEDICINE

## 2020-06-23 PROCEDURE — G8417 CALC BMI ABV UP PARAM F/U: HCPCS | Performed by: INTERNAL MEDICINE

## 2020-06-23 PROCEDURE — 1123F ACP DISCUSS/DSCN MKR DOCD: CPT | Performed by: INTERNAL MEDICINE

## 2020-06-23 PROCEDURE — 4040F PNEUMOC VAC/ADMIN/RCVD: CPT | Performed by: INTERNAL MEDICINE

## 2020-06-23 PROCEDURE — 99214 OFFICE O/P EST MOD 30 MIN: CPT | Performed by: INTERNAL MEDICINE

## 2020-06-23 PROCEDURE — 1036F TOBACCO NON-USER: CPT | Performed by: INTERNAL MEDICINE

## 2020-06-23 RX ORDER — LEVOTHYROXINE SODIUM 0.03 MG/1
25 TABLET ORAL DAILY
Qty: 90 TABLET | Refills: 3 | Status: SHIPPED | OUTPATIENT
Start: 2020-06-23 | End: 2020-09-24 | Stop reason: SDUPTHER

## 2020-06-23 ASSESSMENT — ENCOUNTER SYMPTOMS
SHORTNESS OF BREATH: 0
ABDOMINAL PAIN: 0
NAUSEA: 0
VOMITING: 0
CHEST TIGHTNESS: 0

## 2020-06-23 NOTE — PROGRESS NOTES
MCV 93.1 02/03/2020     02/03/2020     Hypercalcemia   PTH was not checked yet  He follows a urologist   He will see another urologist     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    Atelectasis of left lung 2/18/2016    Bell's palsy 7/24/2015    Left sided - July 2015    Benign non-nodular prostatic hyperplasia with lower urinary tract symptoms 11/3/2015    BPH (benign prostatic hypertrophy) 7/5/2011    Chronic gout without tophus 11/3/2015    Chronic kidney disease, stage III (moderate) (HCC)     Chronic pain of both knees 11/8/2016    Coronary artery calcification seen on CT scan 2/18/2016    Diabetes mellitus, type 2 ([address]     Diastolic dysfunction 3/9/5062    Dyspnea on exertion 2/7/2014    Edema 1/8/2013    Elevated PSA 5/7/2015    Essential hypertension 11/3/2015    Gastroesophageal reflux disease without esophagitis 11/3/2015    GERD (gastroesophageal reflux disease) 7/5/2011    Gout 9/22/2011    Hepatic cyst 2/18/2016    Hypercalcemia 8/24/2017    Hyperlipidemia     Hypertension     Insomnia 6/29/2010    Localized edema 9/20/2016    Morbid obesity due to excess calories ([address] 11/8/2016    Multiple thyroid nodules 2/18/2016    Nocturia 5/7/2015    Obesity     Obstructive sleep apnea     Primary osteoarthritis of left knee 11/8/2016    Primary osteoarthritis of right knee 11/8/2016    Proteinuria 9/27/2010    Pulmonary emphysema ([address] 2/8/2017    Pulmonary nodules 2/4/2016    Shortness of breath 2/4/2016    Type 2 diabetes mellitus with diabetic chronic kidney disease ([address] 11/3/2015    Type 2 diabetes mellitus with stage 3 chronic kidney disease ([address] 2/4/2016    Type 2 diabetes mellitus with stage 3 chronic kidney disease, without long-term current use of insulin ([address] 8/5/2016    Vitamin D deficiency 8/21/2017       Social History:   TOBACCO:   reports that he has quit Angie Doctor, MD   Umeclidinium Bromide (INCRUSE ELLIPTA) 62.5 MCG/INH AEPB Inhale 1 puff into the lungs daily 5/9/17  Yes Ozzy Oconnor MD   aspirin EC 81 MG EC tablet Take 1 tablet by mouth daily. With food. 2/6/15  Yes Ozzy Oconnor MD   Multiple Vitamin (MULTIVITAMIN PO) Take 1 tablet by mouth daily. 5/24/10  Yes Ozzy Oconnor MD       REVIEW OF SYSTEMS:  Review of Systems   Constitutional: Negative for activity change, appetite change, chills, fever and unexpected weight change. HENT: Negative for hearing loss. Eyes: Negative for visual disturbance. Respiratory: Negative for chest tightness and shortness of breath. Cardiovascular: Negative for chest pain. Gastrointestinal: Negative for abdominal pain, nausea and vomiting. Genitourinary: Negative for hematuria. Skin: Negative for rash. Allergic/Immunologic: Negative for immunocompromised state. Neurological: Negative for dizziness and headaches. Psychiatric/Behavioral: Negative for dysphoric mood and suicidal ideas. Physical Exam:      Vitals: /80   Temp 98.6 °F (37 °C)   Ht 5' 6\" (1.676 m)   BMI 45.03 kg/m²     Body mass index is 45.03 kg/m². Wt Readings from Last 3 Encounters:   02/03/20 279 lb (126.6 kg)   10/28/19 (!) 301 lb (136.5 kg)   07/24/19 (!) 302 lb (137 kg)     Physical Exam  Constitutional:       General: He is not in acute distress. Appearance: He is well-developed. He is not diaphoretic. HENT:      Head: Normocephalic and atraumatic. Mouth/Throat:      Pharynx: No oropharyngeal exudate. Eyes:      General: No scleral icterus. Right eye: No discharge. Left eye: No discharge. Conjunctiva/sclera: Conjunctivae normal.   Neck:      Musculoskeletal: Normal range of motion and neck supple. Cardiovascular:      Rate and Rhythm: Normal rate and regular rhythm. Heart sounds: Normal heart sounds. No murmur. Pulmonary:      Effort: Pulmonary effort is normal. No respiratory distress. Future    Chronic kidney disease, stage III (moderate) ([facility])  Recheck kidney function  -     CBC Auto Differential; Future  -     Comprehensive Metabolic Panel; Future    Hyperlipidemia, unspecified hyperlipidemia type  Well controlled- no changes in medication today. Monitor lipids  -     Lipid Panel; Future    Anemia, unspecified type  -     CBC Auto Differential; Future    Hypercalcemia  Check PTH  Likely primary hyperpara  -     Comprehensive Metabolic Panel; Future  -     PTH, Intact; Future        - Patient was encouraged to call the office (and ask to see me) or be seen by other provider for any worsening or lack of improvement of the symptoms within a few days / weeks. - Pt was asked toschedule an appointment in 3 months, and to let me know of any scheduling difficulties. Additional patients instructions (if given): There are no Patient Instructions on file for this visit. Vielka Jarrett M.D.   6/23/2020, 2:03 PM      NOTE: This report was transcribed using voice recognition software. Every effort was made to ensure accuracy, however, inadvertent computerized transcription errors may be present.

## 2020-06-24 LAB
ESTIMATED AVERAGE GLUCOSE: 114 MG/DL
HBA1C MFR BLD: 5.6 %
T4 FREE: 1 NG/DL (ref 0.9–1.8)

## 2020-08-18 ENCOUNTER — TELEPHONE (OUTPATIENT)
Dept: INTERNAL MEDICINE CLINIC | Age: 85
End: 2020-08-18

## 2020-08-18 NOTE — TELEPHONE ENCOUNTER
Pts daughter Dana Him called, pt next to her, stating that his handicap placard is due to   and he is in need of a new renewal prescription to take to the [facility]. Pt is requesting that the prescription be mailed to his home address.         Please advise

## 2020-09-02 RX ORDER — TAMSULOSIN HYDROCHLORIDE 0.4 MG/1
CAPSULE ORAL
Qty: 90 CAPSULE | Refills: 0 | Status: SHIPPED | OUTPATIENT
Start: 2020-09-02 | End: 2020-09-24 | Stop reason: SDUPTHER

## 2020-09-02 RX ORDER — ALLOPURINOL 100 MG/1
TABLET ORAL
Qty: 180 TABLET | Refills: 0 | Status: SHIPPED | OUTPATIENT
Start: 2020-09-02 | End: 2020-09-24 | Stop reason: SDUPTHER

## 2020-09-02 RX ORDER — SIMVASTATIN 20 MG
TABLET ORAL
Qty: 90 TABLET | Refills: 0 | Status: SHIPPED | OUTPATIENT
Start: 2020-09-02 | End: 2020-09-24 | Stop reason: SDUPTHER

## 2020-09-02 RX ORDER — CARVEDILOL 25 MG/1
TABLET ORAL
Qty: 180 TABLET | Refills: 0 | Status: SHIPPED | OUTPATIENT
Start: 2020-09-02 | End: 2020-09-24 | Stop reason: SDUPTHER

## 2020-09-02 RX ORDER — FAMOTIDINE 20 MG/1
TABLET, FILM COATED ORAL
Qty: 180 TABLET | Refills: 0 | Status: SHIPPED | OUTPATIENT
Start: 2020-09-02 | End: 2020-09-24 | Stop reason: SDUPTHER

## 2020-09-24 ENCOUNTER — OFFICE VISIT (OUTPATIENT)
Dept: INTERNAL MEDICINE CLINIC | Age: 85
End: 2020-09-24
Payer: MEDICARE

## 2020-09-24 VITALS
DIASTOLIC BLOOD PRESSURE: 70 MMHG | BODY MASS INDEX: 44.84 KG/M2 | HEIGHT: 66 IN | OXYGEN SATURATION: 98 % | SYSTOLIC BLOOD PRESSURE: 124 MMHG | WEIGHT: 279 LBS | TEMPERATURE: 97.7 F | HEART RATE: 85 BPM

## 2020-09-24 DIAGNOSIS — E04.2 MULTIPLE THYROID NODULES: Chronic | ICD-10-CM

## 2020-09-24 DIAGNOSIS — E66.9 DIABETES MELLITUS TYPE 2 IN OBESE (HCC): Chronic | ICD-10-CM

## 2020-09-24 DIAGNOSIS — N18.30 CHRONIC KIDNEY DISEASE, STAGE III (MODERATE) (HCC): Chronic | ICD-10-CM

## 2020-09-24 DIAGNOSIS — E11.69 DIABETES MELLITUS TYPE 2 IN OBESE (HCC): Chronic | ICD-10-CM

## 2020-09-24 DIAGNOSIS — E78.5 HYPERLIPIDEMIA, UNSPECIFIED HYPERLIPIDEMIA TYPE: Chronic | ICD-10-CM

## 2020-09-24 DIAGNOSIS — I10 ESSENTIAL HYPERTENSION: Chronic | ICD-10-CM

## 2020-09-24 LAB
BASOPHILS ABSOLUTE: 0 K/UL (ref 0–0.2)
BASOPHILS RELATIVE PERCENT: 0.6 %
EOSINOPHILS ABSOLUTE: 0.2 K/UL (ref 0–0.6)
EOSINOPHILS RELATIVE PERCENT: 5.4 %
HCT VFR BLD CALC: 35.2 % (ref 40.5–52.5)
HEMOGLOBIN: 11.3 G/DL (ref 13.5–17.5)
LYMPHOCYTES ABSOLUTE: 1.6 K/UL (ref 1–5.1)
LYMPHOCYTES RELATIVE PERCENT: 35.6 %
MCH RBC QN AUTO: 29.9 PG (ref 26–34)
MCHC RBC AUTO-ENTMCNC: 32.1 G/DL (ref 31–36)
MCV RBC AUTO: 93.2 FL (ref 80–100)
MONOCYTES ABSOLUTE: 0.4 K/UL (ref 0–1.3)
MONOCYTES RELATIVE PERCENT: 8.3 %
NEUTROPHILS ABSOLUTE: 2.3 K/UL (ref 1.7–7.7)
NEUTROPHILS RELATIVE PERCENT: 50.1 %
PDW BLD-RTO: 16.1 % (ref 12.4–15.4)
PLATELET # BLD: 176 K/UL (ref 135–450)
PMV BLD AUTO: 8.8 FL (ref 5–10.5)
RBC # BLD: 3.78 M/UL (ref 4.2–5.9)
WBC # BLD: 4.5 K/UL (ref 4–11)

## 2020-09-24 PROCEDURE — G8926 SPIRO NO PERF OR DOC: HCPCS | Performed by: INTERNAL MEDICINE

## 2020-09-24 PROCEDURE — G8427 DOCREV CUR MEDS BY ELIG CLIN: HCPCS | Performed by: INTERNAL MEDICINE

## 2020-09-24 PROCEDURE — G8417 CALC BMI ABV UP PARAM F/U: HCPCS | Performed by: INTERNAL MEDICINE

## 2020-09-24 PROCEDURE — 99214 OFFICE O/P EST MOD 30 MIN: CPT | Performed by: INTERNAL MEDICINE

## 2020-09-24 PROCEDURE — 90694 VACC AIIV4 NO PRSRV 0.5ML IM: CPT | Performed by: INTERNAL MEDICINE

## 2020-09-24 PROCEDURE — 3023F SPIROM DOC REV: CPT | Performed by: INTERNAL MEDICINE

## 2020-09-24 PROCEDURE — G0008 ADMIN INFLUENZA VIRUS VAC: HCPCS | Performed by: INTERNAL MEDICINE

## 2020-09-24 PROCEDURE — 4040F PNEUMOC VAC/ADMIN/RCVD: CPT | Performed by: INTERNAL MEDICINE

## 2020-09-24 PROCEDURE — 1123F ACP DISCUSS/DSCN MKR DOCD: CPT | Performed by: INTERNAL MEDICINE

## 2020-09-24 PROCEDURE — 1036F TOBACCO NON-USER: CPT | Performed by: INTERNAL MEDICINE

## 2020-09-24 RX ORDER — FAMOTIDINE 20 MG/1
20 TABLET, FILM COATED ORAL DAILY
Qty: 180 TABLET | Refills: 3 | Status: SHIPPED | OUTPATIENT
Start: 2020-09-24 | End: 2020-12-17 | Stop reason: SDUPTHER

## 2020-09-24 RX ORDER — ASPIRIN 81 MG/1
81 TABLET ORAL DAILY
Qty: 90 TABLET | Refills: 3 | Status: SHIPPED | OUTPATIENT
Start: 2020-09-24 | End: 2022-07-11 | Stop reason: CLARIF

## 2020-09-24 RX ORDER — POTASSIUM CHLORIDE 20 MEQ/1
10 TABLET, EXTENDED RELEASE ORAL DAILY
Qty: 90 TABLET | Refills: 3 | Status: SHIPPED | OUTPATIENT
Start: 2020-09-24 | End: 2021-01-25 | Stop reason: SDUPTHER

## 2020-09-24 RX ORDER — CARVEDILOL 25 MG/1
25 TABLET ORAL 2 TIMES DAILY
Qty: 180 TABLET | Refills: 3 | Status: SHIPPED | OUTPATIENT
Start: 2020-09-24 | End: 2020-12-18 | Stop reason: SDUPTHER

## 2020-09-24 RX ORDER — FERROUS SULFATE 325(65) MG
325 TABLET ORAL EVERY OTHER DAY
Qty: 45 TABLET | Refills: 0 | Status: SHIPPED | OUTPATIENT
Start: 2020-09-24 | End: 2021-09-09 | Stop reason: DRUGHIGH

## 2020-09-24 RX ORDER — UMECLIDINIUM 62.5 UG/1
1 AEROSOL, POWDER ORAL DAILY
Qty: 3 EACH | Refills: 3 | Status: SHIPPED | OUTPATIENT
Start: 2020-09-24 | End: 2021-09-02

## 2020-09-24 RX ORDER — TORSEMIDE 100 MG/1
TABLET ORAL
Qty: 135 TABLET | Refills: 3 | Status: SHIPPED | OUTPATIENT
Start: 2020-09-24 | End: 2021-05-26 | Stop reason: SDUPTHER

## 2020-09-24 RX ORDER — ALLOPURINOL 100 MG/1
TABLET ORAL
Qty: 180 TABLET | Refills: 0 | Status: SHIPPED | OUTPATIENT
Start: 2020-09-24 | End: 2020-12-18 | Stop reason: SDUPTHER

## 2020-09-24 RX ORDER — LEVOTHYROXINE SODIUM 0.03 MG/1
25 TABLET ORAL DAILY
Qty: 90 TABLET | Refills: 3 | Status: SHIPPED | OUTPATIENT
Start: 2020-09-24 | End: 2020-12-18 | Stop reason: SDUPTHER

## 2020-09-24 RX ORDER — SIMVASTATIN 20 MG
20 TABLET ORAL NIGHTLY
Qty: 90 TABLET | Refills: 3 | Status: SHIPPED | OUTPATIENT
Start: 2020-09-24 | End: 2022-04-18 | Stop reason: SDUPTHER

## 2020-09-24 RX ORDER — TAMSULOSIN HYDROCHLORIDE 0.4 MG/1
0.4 CAPSULE ORAL DAILY
Qty: 90 CAPSULE | Refills: 3 | Status: SHIPPED | OUTPATIENT
Start: 2020-09-24 | End: 2020-12-18 | Stop reason: SDUPTHER

## 2020-09-24 ASSESSMENT — ENCOUNTER SYMPTOMS
SHORTNESS OF BREATH: 0
CHEST TIGHTNESS: 0
VOMITING: 0
ABDOMINAL PAIN: 0
NAUSEA: 0

## 2020-09-24 NOTE — PROGRESS NOTES
Outpatient Note for established Patient - regular Visit     History Obtained From:  patient, electronic medical record  Is the patient new to me ? - No    HISTORY OF PRESENT ILLNESS:   The patient is a 80 y.o. male who is here today for :  Chantellegustavo Conner was seen today for hypertension. Diagnoses and all orders for this visit:    Need for influenza vaccination    Diabetes mellitus type 2 in obese [facility])  Lab Results   Component Value Date    LABA1C 5.6 06/23/2020     Lab Results   Component Value Date    .0 06/23/2020   he is on Lantus 10 units   FBG- 103 this morning. Chronic kidney disease, stage III (moderate) ([facility])  Lab Results   Component Value Date     06/23/2020    K 3.9 06/23/2020     06/23/2020    CO2 34 06/23/2020    BUN 27 06/23/2020    CREATININE 1.8 06/23/2020    GLUCOSE 104 06/23/2020    GLUCOSE 96 10/06/2011    CALCIUM 10.4 06/23/2020      Essential hypertension  BP is controlled  He is on Coreg, Torsemide     Hyperlipidemia, unspecified hyperlipidemia type  Lab Results   Component Value Date    CHOL 129 06/23/2020    CHOL 143 02/03/2020    CHOL 149 07/24/2019     Lab Results   Component Value Date    TRIG 94 06/23/2020    TRIG 115 02/03/2020    TRIG 112 07/24/2019     Lab Results   Component Value Date    HDL 36 (L) 06/23/2020    HDL 37 (L) 02/03/2020    HDL 42 07/24/2019     Lab Results   Component Value Date    LDLCALC 74 06/23/2020    LDLCALC 83 02/03/2020    LDLCALC 85 07/24/2019     Lab Results   Component Value Date    LABVLDL 19 06/23/2020    LABVLDL 23 02/03/2020    LABVLDL 22 07/24/2019     No results found for: CHOLHDLRATIO  He is on Simvastatin  No side effects    Pt denies CP/SOB/palpitations/abdominal pain/N/V.    No fever/ chills/ cough   He does JIMENEZ    Last TSH 6.5  Recheck before adjusting dose     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    Atelectasis of left lung 2/18/2016    Bell's palsy 7/24/2015    Left sided - July 2015    Benign non-nodular prostatic hyperplasia with lower urinary tract symptoms 11/3/2015    BPH (benign prostatic hypertrophy) 7/5/2011    Chronic gout without tophus 11/3/2015    Chronic kidney disease, stage III (moderate) (HCC)     Chronic pain of both knees 11/8/2016    Coronary artery calcification seen on CT scan 2/18/2016    Diabetes mellitus, type 2 ([address]     Diastolic dysfunction 1/7/9549    Dyspnea on exertion 2/7/2014    Edema 1/8/2013    Elevated PSA 5/7/2015    Essential hypertension 11/3/2015    Gastroesophageal reflux disease without esophagitis 11/3/2015    GERD (gastroesophageal reflux disease) 7/5/2011    Gout 9/22/2011    Hepatic cyst 2/18/2016    Hypercalcemia 8/24/2017    Hyperlipidemia     Hypertension     Insomnia 6/29/2010    Localized edema 9/20/2016    Morbid obesity due to excess calories ([address] 11/8/2016    Multiple thyroid nodules 2/18/2016    Nocturia 5/7/2015    Obesity     Obstructive sleep apnea     Primary osteoarthritis of left knee 11/8/2016    Primary osteoarthritis of right knee 11/8/2016    Proteinuria 9/27/2010    Pulmonary emphysema ([address] 2/8/2017    Pulmonary nodules 2/4/2016    Shortness of breath 2/4/2016    Type 2 diabetes mellitus with diabetic chronic kidney disease ([address] 11/3/2015    Type 2 diabetes mellitus with stage 3 chronic kidney disease ([address] 2/4/2016    Type 2 diabetes mellitus with stage 3 chronic kidney disease, without long-term current use of insulin ([address] 8/5/2016    Vitamin D deficiency 8/21/2017       Social History:   TOBACCO:   reports that he has quit smoking. His smoking use included cigarettes. He has never used smokeless tobacco.      ETOH:   reports no history of alcohol use. Current Medications:    Prior to Admission medications    Medication Sig Start Date End Date Taking?  Authorizing Provider   simvastatin (ZOCOR) 20 MG tablet Take 1 tablet by mouth nightly 9/24/20  Yes Jolene Goss MD   allopurinol (ZYLOPRIM) 100 MG tablet TAKE TWO TABLETS BY MOUTH DAILY 9/24/20  Yes Jolene Goss MD   carvedilol (COREG) 25 MG tablet Take 1 tablet by mouth 2 times daily 9/24/20  Yes Jolene Goss MD   famotidine (PEPCID) 20 MG tablet Take 1 tablet by mouth daily 9/24/20  Yes Jolene Goss MD   tamsulosin (FLOMAX) 0.4 MG capsule Take 1 capsule by mouth daily 9/24/20  Yes Jolene Goss MD   levothyroxine (LEVOTHROID) 25 MCG tablet Take 1 tablet by mouth daily 9/24/20  Yes Jolene Goss MD   potassium chloride (KLOR-CON M) 20 MEQ extended release tablet Take 0.5 tablets by mouth daily 9/24/20  Yes Jolene Goss MD   torsemide (DEMADEX) 100 MG tablet Take 1 tablet (100 mg) in the morning and 1/2 tablet (50 mg) in the late afternoon 9/24/20  Yes Jolene Goss MD   ferrous sulfate (IRON 325) 325 (65 Fe) MG tablet Take 1 tablet by mouth every other day 9/24/20  Yes Jolene Goss MD   Umeclidinium Bromide (INCRUSE ELLIPTA) 62.5 MCG/INH AEPB Inhale 1 puff into the lungs daily 9/24/20  Yes Jolene Goss MD   aspirin EC 81 MG EC tablet Take 1 tablet by mouth daily With food. 9/24/20  Yes Jolene Goss MD   ACCU-CHECAMI MEAGAN PLUS strip USE TO TEST THREE TIMES A DAY BEFORE MEALS 5/27/20  Yes Jolene Goss MD   Insulin Pen Needle 32G X 4 MM MISC Use daily as directed 11/14/18  Yes Jolene Goss MD   ACCU-CHECAMI SOFTCLIX LANCETS MISC Check 3 times day before meals 10/16/18  Yes Jolene Goss MD   albuterol sulfate HFA (PROAIR HFA) 108 (90 Base) MCG/ACT inhaler Inhale 2 puffs into the lungs every 6 hours as needed for Wheezing or Shortness of Breath 7/6/17  Yes Shannan Becker MD   Multiple Vitamin (MULTIVITAMIN PO) Take 1 tablet by mouth daily.  5/24/10  Yes Shannan Becker MD       REVIEW OF SYSTEMS:  Review of Systems   Constitutional: Negative for activity change, appetite change, chills, fever and unexpected weight change. HENT: Negative for hearing loss. Eyes: Negative for visual disturbance. Respiratory: Negative for chest tightness and shortness of breath. Cardiovascular: Negative for chest pain. Gastrointestinal: Negative for abdominal pain, nausea and vomiting. Genitourinary: Negative for hematuria. Skin: Negative for rash. Allergic/Immunologic: Negative for immunocompromised state. Neurological: Negative for dizziness and headaches. Psychiatric/Behavioral: Negative for dysphoric mood and suicidal ideas. Physical Exam:      Vitals: /70 (Site: Right Upper Arm)   Pulse 85   Temp 97.7 °F (36.5 °C)   Ht 5' 6\" (1.676 m)   Wt 279 lb (126.6 kg)   SpO2 98%   BMI 45.03 kg/m²     Body mass index is 45.03 kg/m². Wt Readings from Last 3 Encounters:   09/24/20 279 lb (126.6 kg)   02/03/20 279 lb (126.6 kg)   10/28/19 (!) 301 lb (136.5 kg)     Physical Exam  Constitutional:       General: He is not in acute distress. Appearance: He is well-developed. He is not diaphoretic. HENT:      Head: Normocephalic and atraumatic. Mouth/Throat:      Pharynx: No oropharyngeal exudate. Eyes:      General: No scleral icterus. Right eye: No discharge. Left eye: No discharge. Extraocular Movements: Extraocular movements intact. Conjunctiva/sclera: Conjunctivae normal.   Neck:      Musculoskeletal: Normal range of motion and neck supple. Cardiovascular:      Rate and Rhythm: Normal rate. Heart sounds: Normal heart sounds. No murmur. Pulmonary:      Effort: Pulmonary effort is normal. No respiratory distress. Breath sounds: Normal breath sounds. No wheezing or rales. Abdominal:      General: There is no distension. Palpations: Abdomen is soft. Tenderness: There is no abdominal tenderness. There is no guarding. Musculoskeletal:         General: No deformity. Right lower leg: Edema (+1-2 b/l symmetric ) present.       Left lower leg: Edema present. Lymphadenopathy:      Head:      Right side of head: No submental or submandibular adenopathy. Left side of head: No submental or submandibular adenopathy. Cervical: No cervical adenopathy. Right cervical: No superficial or deep cervical adenopathy. Left cervical: No superficial or deep cervical adenopathy. Skin:     Findings: No rash. Neurological:      Mental Status: He is alert and oriented to person, place, and time. GCS: GCS eye subscore is 4. GCS verbal subscore is 5. GCS motor subscore is 6. Motor: No abnormal muscle tone. Deep Tendon Reflexes: Reflexes are normal and symmetric. Psychiatric:         Behavior: Behavior normal.         Thought Content: Thought content normal.        Assessment/Plan:   Gustavo Key was seen today for hypertension. Diagnoses and all orders for this visit:    Diabetes mellitus type 2 in obese ([facility]  A1c was very low we will try to stop insulin monitor A1c and glucose  Patient instructed to check for 1 week's glucose levels without insulin and will decide if we can keep him off the insulin  -     CBC Auto Differential; Future  -     Comprehensive Metabolic Panel; Future  -     Lipid Panel; Future  -     TSH with Reflex; Future  -     HEMOGLOBIN A1C; Future    Chronic kidney disease, stage III (moderate) (HCC)  Well controlled- no changes in medication today. -     CBC Auto Differential; Future  -     Comprehensive Metabolic Panel; Future    Essential hypertension  Well controlled- no changes in medication today. -     CBC Auto Differential; Future  -     Comprehensive Metabolic Panel; Future  -     Lipid Panel; Future  -     carvedilol (COREG) 25 MG tablet; Take 1 tablet by mouth 2 times daily    Hyperlipidemia, unspecified hyperlipidemia type  Well controlled- no changes in medication today. -     Lipid Panel;  Future    Chronic gout without tophus, unspecified cause, unspecified site  -     allopurinol (ZYLOPRIM) 100 MG tablet; TAKE TWO TABLETS BY MOUTH DAILY    Benign non-nodular prostatic hyperplasia with lower urinary tract symptoms  -     tamsulosin (FLOMAX) 0.4 MG capsule; Take 1 capsule by mouth daily    Multiple thyroid nodules  Keep monitoring TSH  -     TSH with Reflex; Future  -     levothyroxine (LEVOTHROID) 25 MCG tablet; Take 1 tablet by mouth daily    Localized edema  Stable keep current dose of diuretic  -     torsemide (DEMADEX) 100 MG tablet; Take 1 tablet (100 mg) in the morning and 1/2 tablet (50 mg) in the late afternoon    Anemia, unspecified type  -     ferrous sulfate (IRON 325) 325 (65 Fe) MG tablet; Take 1 tablet by mouth every other day    Pulmonary emphysema, unspecified emphysema type (HCC)  -     Umeclidinium Bromide (INCRUSE ELLIPTA) 62.5 MCG/INH AEPB; Inhale 1 puff into the lungs daily    Diabetes mellitus, type 2 (HCC)  -     aspirin EC 81 MG EC tablet; Take 1 tablet by mouth daily With food. Need for influenza vaccination  -     INFLUENZA, QUADV, ADJUVANTED, 65 YRS =, IM, PF, PREFILL SYR, 0.5ML (FLUAD)    Other orders  -     simvastatin (ZOCOR) 20 MG tablet; Take 1 tablet by mouth nightly  -     famotidine (PEPCID) 20 MG tablet; Take 1 tablet by mouth daily  -     potassium chloride (KLOR-CON M) 20 MEQ extended release tablet; Take 0.5 tablets by mouth daily      - Patient was encouraged to call the office (and ask to see me) or be seen by other provider for any worsening or lack of improvement of the symptoms within a few days / weeks. - Pt was asked toschedule an appointment in 4 months, and to let me know of any scheduling difficulties. Additional patients instructions (if given): There are no Patient Instructions on file for this visit. Hector Vann M.D.   9/24/2020, 2:14 PM      NOTE: This report was transcribed using voice recognition software.  Every effort was made to ensure accuracy, however, inadvertent computerized transcription errors may be present.

## 2020-09-24 NOTE — PROGRESS NOTES
Vaccine Information Sheet, \"Influenza - Inactivated\"  given to Uyen Malone, or parent/legal guardian of  yUen Malone and verbalized understanding. Patient responses:    Have you ever had a reaction to a flu vaccine? No  Do you have any current illness? No  Have you ever had Guillian Hinton Syndrome? No  Do you have a serious allergy to any of the follow: Neomycin, Polymyxin, Thimerosal, eggs or egg products? No    Flu vaccine given per order. Please see immunization tab. Risks and benefits explained. Current VIS given.       Immunizations Administered     Name Date Dose Route    Influenza, Quadv, adjuvanted, 65 yrs +, IM, PF (Fluad) 9/24/2020 0.5 mL Intramuscular    Site: Deltoid- Left    Lot: [id number]    NDC: 44356-557-64

## 2020-09-25 LAB
A/G RATIO: 1.5 (ref 1.1–2.2)
ALBUMIN SERPL-MCNC: 4.1 G/DL (ref 3.4–5)
ALP BLD-CCNC: 68 U/L (ref 40–129)
ALT SERPL-CCNC: 8 U/L (ref 10–40)
ANION GAP SERPL CALCULATED.3IONS-SCNC: 6 MMOL/L (ref 3–16)
AST SERPL-CCNC: 14 U/L (ref 15–37)
BILIRUB SERPL-MCNC: 0.3 MG/DL (ref 0–1)
BUN BLDV-MCNC: 25 MG/DL (ref 7–20)
CALCIUM SERPL-MCNC: 10.2 MG/DL (ref 8.3–10.6)
CHLORIDE BLD-SCNC: 99 MMOL/L (ref 99–110)
CHOLESTEROL, TOTAL: 138 MG/DL (ref 0–199)
CO2: 35 MMOL/L (ref 21–32)
CREAT SERPL-MCNC: 1.6 MG/DL (ref 0.8–1.3)
ESTIMATED AVERAGE GLUCOSE: 151.3 MG/DL
GFR AFRICAN AMERICAN: 50
GFR NON-AFRICAN AMERICAN: 41
GLOBULIN: 2.8 G/DL
GLUCOSE BLD-MCNC: 110 MG/DL (ref 70–99)
HBA1C MFR BLD: 6.9 %
HDLC SERPL-MCNC: 39 MG/DL (ref 40–60)
LDL CHOLESTEROL CALCULATED: 83 MG/DL
POTASSIUM SERPL-SCNC: 3.7 MMOL/L (ref 3.5–5.1)
SODIUM BLD-SCNC: 140 MMOL/L (ref 136–145)
TOTAL PROTEIN: 6.9 G/DL (ref 6.4–8.2)
TRIGL SERPL-MCNC: 82 MG/DL (ref 0–150)
TSH REFLEX: 3.77 UIU/ML (ref 0.27–4.2)
VLDLC SERPL CALC-MCNC: 16 MG/DL

## 2020-12-18 RX ORDER — BLOOD SUGAR DIAGNOSTIC
STRIP MISCELLANEOUS
Qty: 100 STRIP | Refills: 1 | Status: SHIPPED | OUTPATIENT
Start: 2020-12-18 | End: 2021-09-02 | Stop reason: SDUPTHER

## 2020-12-18 RX ORDER — TAMSULOSIN HYDROCHLORIDE 0.4 MG/1
0.4 CAPSULE ORAL DAILY
Qty: 90 CAPSULE | Refills: 3 | Status: SHIPPED | OUTPATIENT
Start: 2020-12-18 | End: 2021-12-21 | Stop reason: SDUPTHER

## 2020-12-18 RX ORDER — FAMOTIDINE 20 MG/1
20 TABLET, FILM COATED ORAL DAILY
Qty: 90 TABLET | Refills: 3 | Status: SHIPPED | OUTPATIENT
Start: 2020-12-18 | End: 2021-12-21 | Stop reason: SDUPTHER

## 2020-12-18 RX ORDER — LEVOTHYROXINE SODIUM 0.03 MG/1
25 TABLET ORAL DAILY
Qty: 90 TABLET | Refills: 3 | Status: SHIPPED | OUTPATIENT
Start: 2020-12-18 | End: 2021-12-21 | Stop reason: SDUPTHER

## 2020-12-18 RX ORDER — CARVEDILOL 25 MG/1
25 TABLET ORAL 2 TIMES DAILY
Qty: 180 TABLET | Refills: 3 | Status: SHIPPED | OUTPATIENT
Start: 2020-12-18 | End: 2021-12-21 | Stop reason: SDUPTHER

## 2020-12-18 RX ORDER — ALLOPURINOL 100 MG/1
TABLET ORAL
Qty: 180 TABLET | Refills: 0 | Status: SHIPPED | OUTPATIENT
Start: 2020-12-18 | End: 2021-05-26 | Stop reason: SDUPTHER

## 2021-01-25 ENCOUNTER — OFFICE VISIT (OUTPATIENT)
Dept: FAMILY MEDICINE CLINIC | Age: 86
End: 2021-01-25
Payer: MEDICARE

## 2021-01-25 VITALS
SYSTOLIC BLOOD PRESSURE: 130 MMHG | HEIGHT: 66 IN | TEMPERATURE: 96.6 F | BODY MASS INDEX: 45.03 KG/M2 | DIASTOLIC BLOOD PRESSURE: 60 MMHG

## 2021-01-25 DIAGNOSIS — E78.5 HYPERLIPIDEMIA, UNSPECIFIED HYPERLIPIDEMIA TYPE: Chronic | ICD-10-CM

## 2021-01-25 DIAGNOSIS — N18.31 STAGE 3A CHRONIC KIDNEY DISEASE (HCC): Chronic | ICD-10-CM

## 2021-01-25 DIAGNOSIS — I10 ESSENTIAL HYPERTENSION: Chronic | ICD-10-CM

## 2021-01-25 DIAGNOSIS — E66.9 DIABETES MELLITUS TYPE 2 IN OBESE (HCC): Primary | Chronic | ICD-10-CM

## 2021-01-25 DIAGNOSIS — L89.222 PRESSURE INJURY OF LEFT THIGH, STAGE 2 (HCC): ICD-10-CM

## 2021-01-25 DIAGNOSIS — E11.69 DIABETES MELLITUS TYPE 2 IN OBESE (HCC): Primary | Chronic | ICD-10-CM

## 2021-01-25 DIAGNOSIS — E66.01 MORBID OBESITY DUE TO EXCESS CALORIES (HCC): Chronic | ICD-10-CM

## 2021-01-25 DIAGNOSIS — B35.6 TINEA CRURIS: ICD-10-CM

## 2021-01-25 DIAGNOSIS — E66.9 DIABETES MELLITUS TYPE 2 IN OBESE (HCC): Chronic | ICD-10-CM

## 2021-01-25 DIAGNOSIS — E11.69 DIABETES MELLITUS TYPE 2 IN OBESE (HCC): Chronic | ICD-10-CM

## 2021-01-25 PROCEDURE — 1036F TOBACCO NON-USER: CPT | Performed by: INTERNAL MEDICINE

## 2021-01-25 PROCEDURE — G8417 CALC BMI ABV UP PARAM F/U: HCPCS | Performed by: INTERNAL MEDICINE

## 2021-01-25 PROCEDURE — 4040F PNEUMOC VAC/ADMIN/RCVD: CPT | Performed by: INTERNAL MEDICINE

## 2021-01-25 PROCEDURE — G8427 DOCREV CUR MEDS BY ELIG CLIN: HCPCS | Performed by: INTERNAL MEDICINE

## 2021-01-25 PROCEDURE — 99214 OFFICE O/P EST MOD 30 MIN: CPT | Performed by: INTERNAL MEDICINE

## 2021-01-25 PROCEDURE — 1123F ACP DISCUSS/DSCN MKR DOCD: CPT | Performed by: INTERNAL MEDICINE

## 2021-01-25 PROCEDURE — G8484 FLU IMMUNIZE NO ADMIN: HCPCS | Performed by: INTERNAL MEDICINE

## 2021-01-25 RX ORDER — POTASSIUM CHLORIDE 20 MEQ/1
10 TABLET, EXTENDED RELEASE ORAL DAILY
Qty: 90 TABLET | Refills: 3 | Status: SHIPPED | OUTPATIENT
Start: 2021-01-25 | End: 2022-07-11 | Stop reason: CLARIF

## 2021-01-25 RX ORDER — CLOTRIMAZOLE 1 %
CREAM (GRAM) TOPICAL
Qty: 1 TUBE | Refills: 1 | Status: SHIPPED | OUTPATIENT
Start: 2021-01-25 | End: 2021-02-01

## 2021-01-25 ASSESSMENT — ENCOUNTER SYMPTOMS
SHORTNESS OF BREATH: 0
CHEST TIGHTNESS: 0
NAUSEA: 0
ABDOMINAL PAIN: 0
VOMITING: 0

## 2021-01-25 NOTE — PROGRESS NOTES
- July 2015    Benign non-nodular prostatic hyperplasia with lower urinary tract symptoms 11/3/2015    BPH (benign prostatic hypertrophy) 7/5/2011    Chronic gout without tophus 11/3/2015    Chronic kidney disease, stage III (moderate) (HCC)     Chronic pain of both knees 11/8/2016    Coronary artery calcification seen on CT scan 2/18/2016    Diabetes mellitus, type 2 ([address]     Diastolic dysfunction 5/1/3147    Dyspnea on exertion 2/7/2014    Edema 1/8/2013    Elevated PSA 5/7/2015    Essential hypertension 11/3/2015    Gastroesophageal reflux disease without esophagitis 11/3/2015    GERD (gastroesophageal reflux disease) 7/5/2011    Gout 9/22/2011    Hepatic cyst 2/18/2016    Hypercalcemia 8/24/2017    Hyperlipidemia     Hypertension     Insomnia 6/29/2010    Localized edema 9/20/2016    Morbid obesity due to excess calories ([address] 11/8/2016    Multiple thyroid nodules 2/18/2016    Nocturia 5/7/2015    Obesity     Obstructive sleep apnea     Primary osteoarthritis of left knee 11/8/2016    Primary osteoarthritis of right knee 11/8/2016    Proteinuria 9/27/2010    Pulmonary emphysema ([address] 2/8/2017    Pulmonary nodules 2/4/2016    Shortness of breath 2/4/2016    Type 2 diabetes mellitus with diabetic chronic kidney disease ([address] 11/3/2015    Type 2 diabetes mellitus with stage 3 chronic kidney disease ([address] 2/4/2016    Type 2 diabetes mellitus with stage 3 chronic kidney disease, without long-term current use of insulin ([address] 8/5/2016    Vitamin D deficiency 8/21/2017       Social History:   TOBACCO:   reports that he has quit smoking. His smoking use included cigarettes. He has never used smokeless tobacco.  ETOH:   reports no history of alcohol use. Current Medications:    Prior to Admission medications    Medication Sig Start Date End Date Taking?  Authorizing Provider   potassium chloride (KLOR-CON M) 20 MEQ extended release tablet Take 0.5 tablets by mouth daily 1/25/21  Yes Jagdeep Ambriz MD   clotrimazole (LOTRIMIN) 1 % cream Apply topically 2 times daily. 1/25/21 2/1/21 Yes Jagdeep Ambriz MD   tamsulosin (FLOMAX) 0.4 MG capsule Take 1 capsule by mouth daily 12/18/20  Yes MD BRITNEY Florez MEAGAN PLUS strip USE TO TEST THREE TIMES A DAY BEFORE MEALS 12/18/20  Yes Jagdeep Ambriz MD   carvedilol (COREG) 25 MG tablet Take 1 tablet by mouth 2 times daily 12/18/20  Yes Jagdeep Ambriz MD   allopurinol (ZYLOPRIM) 100 MG tablet TAKE TWO TABLETS BY MOUTH DAILY 12/18/20  Yes Jagdeep Ambriz MD   levothyroxine (SYNTHROID) 25 MCG tablet Take 1 tablet by mouth daily 12/18/20  Yes Jagdeep Ambriz MD   famotidine (PEPCID) 20 MG tablet Take 1 tablet by mouth daily 12/18/20  Yes Jagdeep Ambriz MD   simvastatin (ZOCOR) 20 MG tablet Take 1 tablet by mouth nightly 9/24/20  Yes Jagdeep Ambriz MD   torsemide (DEMADEX) 100 MG tablet Take 1 tablet (100 mg) in the morning and 1/2 tablet (50 mg) in the late afternoon 9/24/20  Yes Jagdeep Ambriz MD   ferrous sulfate (IRON 325) 325 (65 Fe) MG tablet Take 1 tablet by mouth every other day 9/24/20  Yes Jagdeep Ambriz MD   Umeclidinium Bromide (INCRUSE ELLIPTA) 62.5 MCG/INH AEPB Inhale 1 puff into the lungs daily 9/24/20  Yes Jagdeep Ambriz MD   aspirin EC 81 MG EC tablet Take 1 tablet by mouth daily With food. 9/24/20  Yes Jagdeep Ambriz MD   Insulin Pen Needle 32G X 4 MM MISC Use daily as directed 11/14/18  Yes MD CHINO FlorezU-RONALD SOFTCLIX LANCETS MISC Check 3 times day before meals 10/16/18  Yes Jagdeep Ambriz MD   albuterol sulfate HFA (PROAIR HFA) 108 (90 Base) MCG/ACT inhaler Inhale 2 puffs into the lungs every 6 hours as needed for Wheezing or Shortness of Breath 7/6/17  Yes Chalo Hall MD   Multiple Vitamin (MULTIVITAMIN PO) Take 1 tablet by mouth daily.  5/24/10  Yes Chalo aHll MD       REVIEW OF SYSTEMS:  Review of Systems Constitutional: Negative for activity change, appetite change, chills, fever and unexpected weight change. HENT: Negative for hearing loss. Eyes: Negative for visual disturbance. Respiratory: Negative for chest tightness and shortness of breath. Cardiovascular: Negative for chest pain. Gastrointestinal: Negative for abdominal pain, nausea and vomiting. Genitourinary: Negative for hematuria. Skin: Negative for rash. Rash - inguinal area  L post thigh wound   Allergic/Immunologic: Negative for immunocompromised state. Neurological: Negative for dizziness and headaches. Psychiatric/Behavioral: Negative for dysphoric mood and suicidal ideas. Physical Exam:      Vitals: /60   Temp 96.6 °F (35.9 °C)   Ht 5' 6\" (1.676 m)   BMI 45.03 kg/m²     Body mass index is 45.03 kg/m². Wt Readings from Last 3 Encounters:   09/24/20 279 lb (126.6 kg)   02/03/20 279 lb (126.6 kg)   10/28/19 (!) 301 lb (136.5 kg)     Physical Exam  Constitutional:       General: He is not in acute distress. Appearance: He is well-developed. He is not diaphoretic. HENT:      Head: Normocephalic and atraumatic. Mouth/Throat:      Pharynx: No oropharyngeal exudate. Eyes:      General: No scleral icterus. Right eye: No discharge. Left eye: No discharge. Conjunctiva/sclera: Conjunctivae normal.   Neck:      Musculoskeletal: Normal range of motion and neck supple. Cardiovascular:      Rate and Rhythm: Normal rate. Heart sounds: Normal heart sounds. No murmur. Pulmonary:      Effort: Pulmonary effort is normal. No respiratory distress. Breath sounds: Normal breath sounds. No wheezing or rales. Abdominal:      General: There is no distension. Palpations: Abdomen is soft. Tenderness: There is no abdominal tenderness. There is no guarding. Musculoskeletal:         General: No deformity.       Right lower leg: Edema (+1-2 b/l pitting edema, mostly unchanged, no tednerenss. no warm. red) present. Left lower leg: Edema present. Comments: R elbow lateral epicondyle area cyst    Lymphadenopathy:      Head:      Right side of head: No submental or submandibular adenopathy. Left side of head: No submental or submandibular adenopathy. Cervical: No cervical adenopathy. Right cervical: No superficial or deep cervical adenopathy. Left cervical: No superficial or deep cervical adenopathy. Skin:     Findings: No rash. Neurological:      Mental Status: He is alert and oriented to person, place, and time. GCS: GCS eye subscore is 4. GCS verbal subscore is 5. GCS motor subscore is 6. Motor: No abnormal muscle tone. Deep Tendon Reflexes: Reflexes are normal and symmetric. Psychiatric:         Behavior: Behavior normal.         Thought Content: Thought content normal.        Assessment/Plan:   Hattie Flores was seen today for 3 month follow-up and mouth lesions. Diagnoses and all orders for this visit:    Diabetes mellitus type 2 in obese [facility])   controlled? We stopped all insulin and will recheck A1c according to fasting blood glucose patient is likely doing okay  -     CBC Auto Differential; Future  -     Comprehensive Metabolic Panel; Future  -     Hemoglobin A1C; Future  -     Lipid Panel; Future    Essential hypertension  Well controlled- no changes in medication today. Some leg swelling but patient blood pressure is soft no medication changes I will not add diuretics  -     CBC Auto Differential; Future  -     Comprehensive Metabolic Panel; Future  -     Lipid Panel; Future    Hyperlipidemia, unspecified hyperlipidemia type  Well controlled- no changes in medication today. -     Lipid Panel; Future    Morbid obesity due to excess calories ([facility]    Stage 3a chronic kidney disease  Well controlled- no changes in medication today. -     CBC Auto Differential; Future  -     Comprehensive Metabolic Panel;  Future    Pressure injury of left thigh, stage 2 ([address]  They will apply donut cushion, neosporin refer to wound care  -     [place]    Tinea cruris  -     clotrimazole (LOTRIMIN) 1 % cream; Apply topically 2 times daily. Other orders  -     potassium chloride (KLOR-CON M) 20 MEQ extended release tablet; Take 0.5 tablets by mouth daily      - Patient was encouraged to call the office (and ask to see me) or be seen by other provider for any worsening or lack of improvement of the symptoms or any new symptoms.    - Pt was asked toschedule an appointment in 4 months, and to let me know of any scheduling difficulties. Additional patients instructions (if given):   Patient Instructions     Please call the office (and ask to see me) or be seen by other provider for any worsening or lack of improvement of the symptoms or for any new symptoms as soon as possible. Please make sure to schedule your follow appointment as discussed. Please let me know if ay further questions. Please let me know if there are any symptoms or concerns that you feel that needs to be further addressed. Patient Education        Learning About Preventing Pressure Injuries  What are pressure injuries? A pressure injury to the skin is caused by constant pressure over a period of time. The constant pressure blocks the blood supply to the skin. This causes skin cells to die and creates a sore. Pressure injuries are also called bedsores. Pressure injuries usually occur over bony areas, such as the hips, lower back, elbows, heels, and shoulders. Pressure injuries can also occur in places where the skin folds over on itself, or where medical equipment presses on the skin, such as when oxygen tubes press on the ears or cheeks. Pressure injuries can range from red areas on the surface of the skin to severe tissue damage that goes deep into muscle and bone.  Severe sores are hard to treat and slow to heal. When pressure injuries do not heal properly, problems such as bone, blood, and skin infections can develop. What causes pressure injuries? Things that cause pressure injuries include:  · Pressure on the skin and tissues. For example, the sores may happen when a person lies in bed or sits in a wheelchair for a long time. This is the most common cause of pressure injuries. · Sliding down in a bed or chair, forcing the skin to fold over itself (shear force). · Being pulled across bed sheets or other surfaces (friction burns). As we get older, our skin gets more thin and dry and less elastic, so it is easier to damage. Poor nutrition and not getting enough fluids make these natural changes in the skin worse. Skin in this condition may easily develop a pressure injury. Skin can also be damaged by sweat, feces, or urine, making pressure injuries more likely and harder to heal.  How can you help prevent pressure injuries? If you are not able to do these things yourself, ask a family member or friend for help. Change position often  · In a bed, change position every 2 hours. · In a wheelchair or other type of chair, shift your weight every 15 minutes, and give yourself a full relief of weight every hour. ? For a weight shift, lean forward and to the left and right. Push up out of the chair with your arms. If you have a chair that tilts, use the tilt control to help relieve pressure. ? For a full relief of weight, stand up or move to another chair or bed if you are able to. Personal care  · Check your skin every day, especially around bony areas. When a pressure injury is forming, skin temperature can be different than nearby skin. It might be warmer or cooler. The skin can feel either firmer or softer than the surrounding skin. · Keep your skin clean and free of sweat, wound drainage, urine, and feces. · Use skin lotions to keep your skin from drying out and cracking.  Barrier lotions or creams have ingredients that can act as a shield to help protect the skin from moisture or irritation. · Try not to slide or slump across sheets in a chair or bed. And try not to sleep in a recliner chair. Lifestyle choices  · Eat healthy foods with plenty of protein to help heal damaged skin and to help new skin grow. · Get plenty of fluids. · Stay at a healthy weight. Being either overweight or underweight can make pressure injuries more likely. · If you smoke, stop. Smoking dries the skin and reduces its blood supply. If you need help quitting, talk to your doctor about stop-smoking programs and medicines. These can increase your chances of quitting for good. Ask about using cushions or pads  · Overlays are special pads you put on top of a mattress. They provide a softer surface that will fit your body's shape better than a regular mattress. · Cushions or devices can be used to reduce pressure on certain areas of the body. For example, you can use a \"medical heel pillow\" to help prevent pressure injuries on heels. You can also get cushions for seating surfaces, such as wheelchair seats. Talk with your doctor about cushions and pads. Some products, such as doughnut-type devices, may actually cause pressure injuries or make them worse. Where can you learn more? Go to https://I-Tech.[place]. org and sign in to your Uberseq account. Enter 928 2674 in the [facility] box to learn more about \"Learning About Preventing Pressure Injuries. \"     If you do not have an account, please click on the \"Sign Up Now\" link. Current as of: March 4, 2020               Content Version: 12.6  © 0875-9928 [employer], Incorporated. Care instructions adapted under license by [facility] ([facility]). If you have questions about a medical condition or this instruction, always ask your healthcare professional. [place] [address] any warranty or liability for your use of this information.          Patient Education        Learning About opposite side of the bed so that he or she will have room to roll. · Go to the side of the bed you want your love one to roll toward. · Ask your loved one to lie on his or her back with the knees bent. Have your loved one place his or her arms across the body. · Ask your loved one to roll toward you while keeping the knees bent. If you have a rail on the bed, have your loved one reach toward the rail. · Help your loved one as needed. Gently place your hands on the shoulders and hips, and guide him or her toward you. If your loved one can't help  It is best to turn your loved one every 2 hours. If your loved one cannot move or finds it very hard, you can use your drawsheet (see \"What is a drawsheet? \"). Have a family member or friend help you. It is easier for two people to turn someone, and it can be dangerous for one person to do it. Position your loved one  · One person stands on each side of the bed. If your loved one is in a hospital bed, lower the height of the bed. This will make it easier to turn the person. · Untuck the drawsheet on both sides of the bed. Each person gathers up one side so you both almost have a \"handle\" to grab. You may also need to make sure your loved one is high enough up in the bed. If not, lift him or her toward the head of the bed. · Agree on a count, and then lift and move your loved one to the side of the bed you're going to roll away from. · Tuck in the drawsheet on the side of the bed that your loved one will roll toward. Move your loved one  When you and your assistant are ready:  · Help your loved one lie on his or her back with the knees bent. If your loved one can't bend the knees, cross one ankle over the other in the direction of the turn. Position your loved one's arms across his or her body. · Stand on opposite sides of the bed. One person will pull and the other push. Be sure that you and your assistant have your feet shoulder-width apart.  This will help you avoid straining your back. ? If you're pulling your loved one toward you, lean from your hips (don't bend your back), reach over your loved one, and grab the drawsheet at your loved one's hip and shoulder areas. Slowly pull the drawsheet toward you to roll your loved one over. ? If you're rolling your loved one away from you, slowly push at the hip and shoulder areas. Moving someone in bed is best as a two-person job. If your loved one can help, even a little, you may be able to do it yourself. But do all you can to find someone to help you. How do you turn or move a person in bed? Positioning a drawsheet   1. When you make someone's bed with a drawsheet, position it so that it is between the person's head and knees. Turning or moving a person in bed   1. The drawsheet can then be used to turn or move the person you're caring for. What do you do after turning someone? You can use pillows to help your loved one get comfortable and avoid pressure injuries. If your loved one is on his or her side:  · Place pillows in front of your loved one, at chest level, with the top arm draped over a pillow. · If needed, tuck one edge of a pillow under the buttock, lengthwise. Then fold the pillow under and tuck the other edge under the first edge. That creates a \"roll\" that stays in place better and helps keep your loved one from rolling back. · Place a pillow between your loved one's knees, with the legs slightly bent. · Put the top leg a little in front of the bottom leg. This takes pressure off the bottom leg. · Put a pillow under the bottom leg so that the bottom ankle is off the bed. If your loved one is on his or her back:  · Put a pillow under your loved one's legs between the knees and ankles. · Do not put anything under the heels. · If you have a hospital bed, don't adjust the top end above 30 degrees. This helps prevent your loved one from sliding down.   When you are finished, smooth out the drawsheet in its original position and tuck it in. Where can you learn more? Go to https://chpepiceweb.CHNL. org and sign in to your OptoNova account. Enter B726 in the [facility] box to learn more about \"Learning About Turning a Person in Bed. \"     If you do not have an account, please click on the \"Sign Up Now\" link. Current as of: December 9, 2019               Content Version: 12.6  © 2744-6076 [employer]. Care instructions adapted under license by [facility] ([facility]). If you have questions about a medical condition or this instruction, always ask your healthcare professional. [place] [address] any warranty or liability for your use of this information. Patient Education        Pressure Injuries: Care Instructions  Your Care Instructions     A pressure injury on the skin is caused by constant pressure to that area. These injuries--also called decubitus ulcers or bedsores--may happen when you lie in bed or sit in a wheelchair for a long time. The constant pressure blocks the blood supply to the skin. This causes skin cells to die and creates a sore. Pressure injuries usually occur over bony areas, such as the hips, lower back, elbows, heels, and shoulders. They also can occur in places where the skin folds over on itself. You may have mild redness or open sores that are harder to heal.  Good care at home can help heal pressure injuries. You or your caregiver needs to check your skin every day for sores. You need good nutrition and plenty of fluids to keep your skin healthy and prevent new pressure injuries. Follow-up care is a key part of your treatment and safety. Be sure to make and go to all appointments, and call your doctor if you are having problems. It's also a good idea to know your test results and keep a list of the medicines you take. How can you care for yourself at home?   · If your doctor prescribed a medicated ointment or cream, use it exactly as prescribed. Call your doctor if you think you are having a problem with your medicine. · Wash pressure injuries every day, or as often as your doctor recommends. Most tap water is safe, but follow the advice of your doctor or nurse. He or she may recommend that you use a saline solution. This is a salt and water solution that you can buy over the counter. · Put on bandages as your doctor or wound care specialist says. · Keep healthy tissue around the sore clean and dry. · Check your skin every day for sores (or have a caregiver do it). · If you know what is causing the pressure that caused the sore, find a way to remove that pressure. To prevent pressure injuries  · Change your position or have your caregiver help you change your position often. You may need to do this every 2 hours if you are in bed or every 15 minutes if you are in a wheelchair. This lowers the chance of making sores worse and getting new sores. · Use special mattresses or other support. These may include low-pressure mattresses or cushions made of foam that can be filled with air, water, beads, or fiber. · Eat healthy foods with plenty of protein to help heal damaged skin and to help new skin grow. · Try to stay at a healthy weight. Being overweight can lead to more pressure on your skin. · Do not slide across sheets or slump in a chair or bed. · Do not smoke. Smoking dries the skin and reduces its blood supply. If you need help quitting, talk to your doctor about stop-smoking programs and medicines. These can increase your chances of quitting for good. When should you call for help? Call your doctor now or seek immediate medical care if:    · You have signs of infection, such as:  ? Increased pain, swelling, warmth, or redness. ? Red streaks leading from the sore. ? Pus draining from the sore. ? A fever.    Watch closely for changes in your health, and be sure to contact your doctor if:    · Your pressure injuries are not healing.     · You have new pressure injuries.     · You need help changing positions in bed or in a chair.     · Your caregiver needs help to move you. Where can you learn more? Go to https://Intuitive User Interfaces.Kiind.me. org and sign in to your Fiksu account. Enter F114 in the [facility] box to learn more about \"Pressure Injuries: Care Instructions. \"     If you do not have an account, please click on the \"Sign Up Now\" link. Current as of: March 4, 2020               Content Version: 12.6  © 9226-1057 [employer], Incorporated. Care instructions adapted under license by [facility] ([facility]). If you have questions about a medical condition or this instruction, always ask your healthcare professional. [address] any warranty or liability for your use of this information. Ivonne Velasco M.D.   1/25/2021, 2:18 PM      NOTE: This report was transcribed using voice recognition software. Every effort was made to ensure accuracy, however, inadvertent computerized transcription errors may be present.

## 2021-01-25 NOTE — PATIENT INSTRUCTIONS
Please call the office (and ask to see me) or be seen by other provider for any worsening or lack of improvement of the symptoms or for any new symptoms as soon as possible. Please make sure to schedule your follow appointment as discussed. Please let me know if ay further questions. Please let me know if there are any symptoms or concerns that you feel that needs to be further addressed. Patient Education        Learning About Preventing Pressure Injuries  What are pressure injuries? A pressure injury to the skin is caused by constant pressure over a period of time. The constant pressure blocks the blood supply to the skin. This causes skin cells to die and creates a sore. Pressure injuries are also called bedsores. Pressure injuries usually occur over bony areas, such as the hips, lower back, elbows, heels, and shoulders. Pressure injuries can also occur in places where the skin folds over on itself, or where medical equipment presses on the skin, such as when oxygen tubes press on the ears or cheeks. Pressure injuries can range from red areas on the surface of the skin to severe tissue damage that goes deep into muscle and bone. Severe sores are hard to treat and slow to heal. When pressure injuries do not heal properly, problems such as bone, blood, and skin infections can develop. What causes pressure injuries? Things that cause pressure injuries include:  · Pressure on the skin and tissues. For example, the sores may happen when a person lies in bed or sits in a wheelchair for a long time. This is the most common cause of pressure injuries. · Sliding down in a bed or chair, forcing the skin to fold over itself (shear force). · Being pulled across bed sheets or other surfaces (friction burns). As we get older, our skin gets more thin and dry and less elastic, so it is easier to damage. Poor nutrition and not getting enough fluids make these natural changes in the skin worse.  Skin in this condition may easily develop a pressure injury. Skin can also be damaged by sweat, feces, or urine, making pressure injuries more likely and harder to heal.  How can you help prevent pressure injuries? If you are not able to do these things yourself, ask a family member or friend for help. Change position often  · In a bed, change position every 2 hours. · In a wheelchair or other type of chair, shift your weight every 15 minutes, and give yourself a full relief of weight every hour. ? For a weight shift, lean forward and to the left and right. Push up out of the chair with your arms. If you have a chair that tilts, use the tilt control to help relieve pressure. ? For a full relief of weight, stand up or move to another chair or bed if you are able to. Personal care  · Check your skin every day, especially around bony areas. When a pressure injury is forming, skin temperature can be different than nearby skin. It might be warmer or cooler. The skin can feel either firmer or softer than the surrounding skin. · Keep your skin clean and free of sweat, wound drainage, urine, and feces. · Use skin lotions to keep your skin from drying out and cracking. Barrier lotions or creams have ingredients that can act as a shield to help protect the skin from moisture or irritation. · Try not to slide or slump across sheets in a chair or bed. And try not to sleep in a recliner chair. Lifestyle choices  · Eat healthy foods with plenty of protein to help heal damaged skin and to help new skin grow. · Get plenty of fluids. · Stay at a healthy weight. Being either overweight or underweight can make pressure injuries more likely. · If you smoke, stop. Smoking dries the skin and reduces its blood supply. If you need help quitting, talk to your doctor about stop-smoking programs and medicines. These can increase your chances of quitting for good.   Ask about using cushions or pads  · Overlays are special pads you put on top of a mattress. They provide a softer surface that will fit your body's shape better than a regular mattress. · Cushions or devices can be used to reduce pressure on certain areas of the body. For example, you can use a \"medical heel pillow\" to help prevent pressure injuries on heels. You can also get cushions for seating surfaces, such as wheelchair seats. Talk with your doctor about cushions and pads. Some products, such as doughnut-type devices, may actually cause pressure injuries or make them worse. Where can you learn more? Go to https://chpepiceweb.TripIt. org and sign in to your [employer] account. Enter 446 1230 in the [employer] box to learn more about \"Learning About Preventing Pressure Injuries. \"     If you do not have an account, please click on the \"Sign Up Now\" link. Current as of: March 4, 2020               Content Version: 12.6  © 2006-2020 [employer]. Care instructions adapted under license by [facility] . If you have questions about a medical condition or this instruction, always ask your healthcare professional. [place] [address] any warranty or liability for your use of this information. Patient Education        Learning About Turning a Person in Bed  Why is it important to turn a person in bed? People sometimes have to stay in bed for long periods of time. They may be very sick, in pain, or very weak and not be able to move themselves into different positions. It's very important that your loved one changes positions. Lying in one position for a long time can cause pressure injuries (also called pressure sores). Pressure injuries are damage to the skin. They can range from red areas on the surface of the skin to severe tissue damage that goes deep into muscle and bone.  These problems are hard to treat and slow to heal. When pressure injuries don't heal well, they can cause problems such as bone, blood, and skin infections. Pressure injuries usually occur over bony areas, such as the hips, lower back, elbows, heels, and shoulders. They can also occur in places where the skin folds over on itself. You can help your loved one avoid pressure injuries by helping him or her turn and change position in bed. A drawsheet can help. What is a drawsheet? A drawsheet makes it easier to \"roll\" your loved one into another position. You can buy a drawsheet, or you can make one with a sheet. You then make the bed using the drawsheet. To make a drawsheet:  · Fold a sheet in half lengthwise. · Place the sheet on top of the fitted bottom sheet so that the top and bottom of the drawsheet go across the bed (perpendicular to the bed). Position the drawsheet so that it will be between your loved one's head and knees. · Tuck in the drawsheet tightly on both sides. Smooth out any wrinkles to reduce possible skin irritation. How do you turn a person in bed? Before getting started, tell your loved one that you want him or her to roll into another position. If your loved one has any drains, tubes, or other medical equipment, adjust them so they don't get in the way. If your loved one can help  · You may need to help your loved one scoot toward the opposite side of the bed so that he or she will have room to roll. · Go to the side of the bed you want your love one to roll toward. · Ask your loved one to lie on his or her back with the knees bent. Have your loved one place his or her arms across the body. · Ask your loved one to roll toward you while keeping the knees bent. If you have a rail on the bed, have your loved one reach toward the rail. · Help your loved one as needed. Gently place your hands on the shoulders and hips, and guide him or her toward you. If your loved one can't help  It is best to turn your loved one every 2 hours.  If your loved one cannot move or finds it very hard, you can use your drawsheet (see \"What is a drawsheet? \"). Have a family member or friend help you. It is easier for two people to turn someone, and it can be dangerous for one person to do it. Position your loved one  · One person stands on each side of the bed. If your loved one is in a hospital bed, lower the height of the bed. This will make it easier to turn the person. · Untuck the drawsheet on both sides of the bed. Each person gathers up one side so you both almost have a \"handle\" to grab. You may also need to make sure your loved one is high enough up in the bed. If not, lift him or her toward the head of the bed. · Agree on a count, and then lift and move your loved one to the side of the bed you're going to roll away from. · Tuck in the drawsheet on the side of the bed that your loved one will roll toward. Move your loved one  When you and your assistant are ready:  · Help your loved one lie on his or her back with the knees bent. If your loved one can't bend the knees, cross one ankle over the other in the direction of the turn. Position your loved one's arms across his or her body. · Stand on opposite sides of the bed. One person will pull and the other push. Be sure that you and your assistant have your feet shoulder-width apart. This will help you avoid straining your back. ? If you're pulling your loved one toward you, lean from your hips (don't bend your back), reach over your loved one, and grab the drawsheet at your loved one's hip and shoulder areas. Slowly pull the drawsheet toward you to roll your loved one over. ? If you're rolling your loved one away from you, slowly push at the hip and shoulder areas. Moving someone in bed is best as a two-person job. If your loved one can help, even a little, you may be able to do it yourself. But do all you can to find someone to help you. How do you turn or move a person in bed? Positioning a drawsheet   1.  When you make someone's bed with a drawsheet, position it so that it is between the person's head and knees. Turning or moving a person in bed   1. The drawsheet can then be used to turn or move the person you're caring for. What do you do after turning someone? You can use pillows to help your loved one get comfortable and avoid pressure injuries. If your loved one is on his or her side:  · Place pillows in front of your loved one, at chest level, with the top arm draped over a pillow. · If needed, tuck one edge of a pillow under the buttock, lengthwise. Then fold the pillow under and tuck the other edge under the first edge. That creates a \"roll\" that stays in place better and helps keep your loved one from rolling back. · Place a pillow between your loved one's knees, with the legs slightly bent. · Put the top leg a little in front of the bottom leg. This takes pressure off the bottom leg. · Put a pillow under the bottom leg so that the bottom ankle is off the bed. If your loved one is on his or her back:  · Put a pillow under your loved one's legs between the knees and ankles. · Do not put anything under the heels. · If you have a hospital bed, don't adjust the top end above 30 degrees. This helps prevent your loved one from sliding down. When you are finished, smooth out the drawsheet in its original position and tuck it in. Where can you learn more? Go to https://ePig Gamesperishiew9sky.com.Good Seed. org and sign in to your [employer] account. Enter A948 in the [facility] box to learn more about \"Learning About Turning a Person in Bed. \"     If you do not have an account, please click on the \"Sign Up Now\" link. Current as of: December 9, 2019               Content Version: 12.6  © 5658-8256 [employer]. Care instructions adapted under license by [facility] ([facility]).  If you have questions about a medical condition or this instruction, always ask your healthcare professional. [place] [address] any warranty or liability for your use of this information. Patient Education        Pressure Injuries: Care Instructions  Your Care Instructions     A pressure injury on the skin is caused by constant pressure to that area. These injuries--also called decubitus ulcers or bedsores--may happen when you lie in bed or sit in a wheelchair for a long time. The constant pressure blocks the blood supply to the skin. This causes skin cells to die and creates a sore. Pressure injuries usually occur over bony areas, such as the hips, lower back, elbows, heels, and shoulders. They also can occur in places where the skin folds over on itself. You may have mild redness or open sores that are harder to heal.  Good care at home can help heal pressure injuries. You or your caregiver needs to check your skin every day for sores. You need good nutrition and plenty of fluids to keep your skin healthy and prevent new pressure injuries. Follow-up care is a key part of your treatment and safety. Be sure to make and go to all appointments, and call your doctor if you are having problems. It's also a good idea to know your test results and keep a list of the medicines you take. How can you care for yourself at home? · If your doctor prescribed a medicated ointment or cream, use it exactly as prescribed. Call your doctor if you think you are having a problem with your medicine. · Wash pressure injuries every day, or as often as your doctor recommends. Most tap water is safe, but follow the advice of your doctor or nurse. He or she may recommend that you use a saline solution. This is a salt and water solution that you can buy over the counter. · Put on bandages as your doctor or wound care specialist says. · Keep healthy tissue around the sore clean and dry. · Check your skin every day for sores (or have a caregiver do it). · If you know what is causing the pressure that caused the sore, find a way to remove that pressure.   To prevent pressure injuries  · Change your position or have your caregiver help you change your position often. You may need to do this every 2 hours if you are in bed or every 15 minutes if you are in a wheelchair. This lowers the chance of making sores worse and getting new sores. · Use special mattresses or other support. These may include low-pressure mattresses or cushions made of foam that can be filled with air, water, beads, or fiber. · Eat healthy foods with plenty of protein to help heal damaged skin and to help new skin grow. · Try to stay at a healthy weight. Being overweight can lead to more pressure on your skin. · Do not slide across sheets or slump in a chair or bed. · Do not smoke. Smoking dries the skin and reduces its blood supply. If you need help quitting, talk to your doctor about stop-smoking programs and medicines. These can increase your chances of quitting for good. When should you call for help? Call your doctor now or seek immediate medical care if:    · You have signs of infection, such as:  ? Increased pain, swelling, warmth, or redness. ? Red streaks leading from the sore. ? Pus draining from the sore. ? A fever. Watch closely for changes in your health, and be sure to contact your doctor if:    · Your pressure injuries are not healing.     · You have new pressure injuries.     · You need help changing positions in bed or in a chair.     · Your caregiver needs help to move you. Where can you learn more? Go to https://COPsyncoscarSocial Intelligence.Evi. org and sign in to your [employer] account. Enter F114 in the [facility] box to learn more about \"Pressure Injuries: Care Instructions. \"     If you do not have an account, please click on the \"Sign Up Now\" link. Current as of: March 4, 2020               Content Version: 12.6  © 2061-4653 [employer]. Care instructions adapted under license by [facility] ([facility]).  If you have questions about a medical condition or this instruction, always ask your healthcare professional. [address] any warranty or liability for your use of this information.

## 2021-01-26 LAB
A/G RATIO: 1.2 (ref 1.1–2.2)
ALBUMIN SERPL-MCNC: 4.1 G/DL (ref 3.4–5)
ALP BLD-CCNC: 66 U/L (ref 40–129)
ALT SERPL-CCNC: 6 U/L (ref 10–40)
ANION GAP SERPL CALCULATED.3IONS-SCNC: 9 MMOL/L (ref 3–16)
AST SERPL-CCNC: 15 U/L (ref 15–37)
BASOPHILS ABSOLUTE: 0 K/UL (ref 0–0.2)
BASOPHILS RELATIVE PERCENT: 0.5 %
BILIRUB SERPL-MCNC: 0.4 MG/DL (ref 0–1)
BUN BLDV-MCNC: 17 MG/DL (ref 7–20)
CALCIUM SERPL-MCNC: 10.7 MG/DL (ref 8.3–10.6)
CHLORIDE BLD-SCNC: 100 MMOL/L (ref 99–110)
CHOLESTEROL, TOTAL: 128 MG/DL (ref 0–199)
CO2: 33 MMOL/L (ref 21–32)
CREAT SERPL-MCNC: 1.7 MG/DL (ref 0.8–1.3)
EOSINOPHILS ABSOLUTE: 0.3 K/UL (ref 0–0.6)
EOSINOPHILS RELATIVE PERCENT: 5 %
ESTIMATED AVERAGE GLUCOSE: 159.9 MG/DL
GFR AFRICAN AMERICAN: 46
GFR NON-AFRICAN AMERICAN: 38
GLOBULIN: 3.5 G/DL
GLUCOSE BLD-MCNC: 107 MG/DL (ref 70–99)
HBA1C MFR BLD: 7.2 %
HCT VFR BLD CALC: 30.6 % (ref 40.5–52.5)
HDLC SERPL-MCNC: 42 MG/DL (ref 40–60)
HEMOGLOBIN: 9.3 G/DL (ref 13.5–17.5)
LDL CHOLESTEROL CALCULATED: 68 MG/DL
LYMPHOCYTES ABSOLUTE: 1.6 K/UL (ref 1–5.1)
LYMPHOCYTES RELATIVE PERCENT: 26.7 %
MCH RBC QN AUTO: 25.9 PG (ref 26–34)
MCHC RBC AUTO-ENTMCNC: 30.4 G/DL (ref 31–36)
MCV RBC AUTO: 85.3 FL (ref 80–100)
MONOCYTES ABSOLUTE: 0.5 K/UL (ref 0–1.3)
MONOCYTES RELATIVE PERCENT: 8.4 %
NEUTROPHILS ABSOLUTE: 3.5 K/UL (ref 1.7–7.7)
NEUTROPHILS RELATIVE PERCENT: 59.4 %
PDW BLD-RTO: 16.3 % (ref 12.4–15.4)
PLATELET # BLD: 188 K/UL (ref 135–450)
PMV BLD AUTO: 8.9 FL (ref 5–10.5)
POTASSIUM SERPL-SCNC: 3.9 MMOL/L (ref 3.5–5.1)
RBC # BLD: 3.59 M/UL (ref 4.2–5.9)
SODIUM BLD-SCNC: 142 MMOL/L (ref 136–145)
TOTAL PROTEIN: 7.6 G/DL (ref 6.4–8.2)
TRIGL SERPL-MCNC: 92 MG/DL (ref 0–150)
VLDLC SERPL CALC-MCNC: 18 MG/DL
WBC # BLD: 5.8 K/UL (ref 4–11)

## 2021-01-28 DIAGNOSIS — E66.9 DIABETES MELLITUS TYPE 2 IN OBESE (HCC): Chronic | ICD-10-CM

## 2021-01-28 DIAGNOSIS — E11.69 DIABETES MELLITUS TYPE 2 IN OBESE (HCC): Chronic | ICD-10-CM

## 2021-01-28 DIAGNOSIS — D64.9 NORMOCYTIC ANEMIA: Primary | ICD-10-CM

## 2021-05-25 ENCOUNTER — TELEPHONE (OUTPATIENT)
Dept: FAMILY MEDICINE CLINIC | Age: 86
End: 2021-05-25

## 2021-05-26 ENCOUNTER — OFFICE VISIT (OUTPATIENT)
Dept: FAMILY MEDICINE CLINIC | Age: 86
End: 2021-05-26
Payer: MEDICARE

## 2021-05-26 VITALS
TEMPERATURE: 97.8 F | OXYGEN SATURATION: 94 % | HEART RATE: 63 BPM | DIASTOLIC BLOOD PRESSURE: 70 MMHG | WEIGHT: 276 LBS | SYSTOLIC BLOOD PRESSURE: 138 MMHG | HEIGHT: 66 IN | BODY MASS INDEX: 44.36 KG/M2

## 2021-05-26 DIAGNOSIS — I10 ESSENTIAL HYPERTENSION: Chronic | ICD-10-CM

## 2021-05-26 DIAGNOSIS — N18.31 STAGE 3A CHRONIC KIDNEY DISEASE (HCC): Chronic | ICD-10-CM

## 2021-05-26 DIAGNOSIS — E11.69 DIABETES MELLITUS TYPE 2 IN OBESE (HCC): Primary | Chronic | ICD-10-CM

## 2021-05-26 DIAGNOSIS — M1A.9XX0 CHRONIC GOUT WITHOUT TOPHUS, UNSPECIFIED CAUSE, UNSPECIFIED SITE: Chronic | ICD-10-CM

## 2021-05-26 DIAGNOSIS — E11.69 DIABETES MELLITUS TYPE 2 IN OBESE (HCC): Chronic | ICD-10-CM

## 2021-05-26 DIAGNOSIS — D64.9 NORMOCYTIC ANEMIA: ICD-10-CM

## 2021-05-26 DIAGNOSIS — E78.5 HYPERLIPIDEMIA, UNSPECIFIED HYPERLIPIDEMIA TYPE: Chronic | ICD-10-CM

## 2021-05-26 DIAGNOSIS — E66.9 DIABETES MELLITUS TYPE 2 IN OBESE (HCC): Primary | Chronic | ICD-10-CM

## 2021-05-26 DIAGNOSIS — R60.0 LOCALIZED EDEMA: Chronic | ICD-10-CM

## 2021-05-26 DIAGNOSIS — E66.9 DIABETES MELLITUS TYPE 2 IN OBESE (HCC): Chronic | ICD-10-CM

## 2021-05-26 PROCEDURE — G8417 CALC BMI ABV UP PARAM F/U: HCPCS | Performed by: INTERNAL MEDICINE

## 2021-05-26 PROCEDURE — G8427 DOCREV CUR MEDS BY ELIG CLIN: HCPCS | Performed by: INTERNAL MEDICINE

## 2021-05-26 PROCEDURE — 99214 OFFICE O/P EST MOD 30 MIN: CPT | Performed by: INTERNAL MEDICINE

## 2021-05-26 PROCEDURE — 4040F PNEUMOC VAC/ADMIN/RCVD: CPT | Performed by: INTERNAL MEDICINE

## 2021-05-26 PROCEDURE — 1123F ACP DISCUSS/DSCN MKR DOCD: CPT | Performed by: INTERNAL MEDICINE

## 2021-05-26 PROCEDURE — 1036F TOBACCO NON-USER: CPT | Performed by: INTERNAL MEDICINE

## 2021-05-26 RX ORDER — TORSEMIDE 100 MG/1
TABLET ORAL
Qty: 135 TABLET | Refills: 3 | Status: SHIPPED | OUTPATIENT
Start: 2021-05-26 | End: 2021-12-21 | Stop reason: SDUPTHER

## 2021-05-26 RX ORDER — ALLOPURINOL 100 MG/1
TABLET ORAL
Qty: 180 TABLET | Refills: 3 | Status: SHIPPED | OUTPATIENT
Start: 2021-05-26

## 2021-05-26 ASSESSMENT — ENCOUNTER SYMPTOMS
ABDOMINAL PAIN: 0
NAUSEA: 0
SHORTNESS OF BREATH: 0
CHEST TIGHTNESS: 0
VOMITING: 0

## 2021-05-26 NOTE — PROGRESS NOTES
Outpatient Note for established Patient - regular Visit     History Obtained From:  patient, electronic medical record  Is the patient new to me ? - No    HISTORY OF PRESENT ILLNESS:   The patient is a 80 y.o. male who is here today for :  Marny Meigs was seen today for other. Diagnoses and all orders for this visit:    Diabetes mellitus type 2 in obese [facility])    Essential hypertension    Stage 3a chronic kidney disease    Hyperlipidemia, unspecified hyperlipidemia type    Localized edema    Chronic gout without tophus, unspecified cause, unspecified site    Normocytic anemia    Lab Results   Component Value Date    LABA1C 7.2 01/25/2021     Lab Results   Component Value Date    .9 01/25/2021   pt is not on DM meds  We stopped Insulin and A1C trended up   FBG ~ 130 mg %  Lab Results   Component Value Date     01/25/2021    K 3.9 01/25/2021     01/25/2021    CO2 33 01/25/2021    BUN 17 01/25/2021    CREATININE 1.7 01/25/2021    GLUCOSE 107 01/25/2021    GLUCOSE 96 10/06/2011    CALCIUM 10.7 01/25/2021   GFR is stable     no fever/ chills/ coughb   Pt got both Covid vaccine s  No gout pain     BP is controlled   Pt denies CP/SOB/palpitations/abdominal pain/N/V.        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    Atelectasis of left lung 2/18/2016    Bell's palsy 7/24/2015    Left sided - July 2015    Benign non-nodular prostatic hyperplasia with lower urinary tract symptoms 11/3/2015    BPH (benign prostatic hypertrophy) 7/5/2011    Chronic gout without tophus 11/3/2015    Chronic kidney disease, stage III (moderate)     Chronic pain of both knees 11/8/2016    Coronary artery calcification seen on CT scan 2/18/2016    Diabetes mellitus, type 2 ([facility] [address]     Diastolic dysfunction 8/9/1516    Dyspnea on exertion 2/7/2014    Edema 1/8/2013    Elevated PSA 5/7/2015    Essential hypertension 11/3/2015    Gastroesophageal reflux disease without esophagitis 11/3/2015    GERD (gastroesophageal reflux disease) 7/5/2011    Gout 9/22/2011    Hepatic cyst 2/18/2016    Hypercalcemia 8/24/2017    Hyperlipidemia     Hypertension     Insomnia 6/29/2010    Localized edema 9/20/2016    Morbid obesity due to excess calories ([facility] 11/8/2016    Multiple thyroid nodules 2/18/2016    Nocturia 5/7/2015    Obesity     Obstructive sleep apnea     Primary osteoarthritis of left knee 11/8/2016    Primary osteoarthritis of right knee 11/8/2016    Proteinuria 9/27/2010    Pulmonary emphysema ([facility] 2/8/2017    Pulmonary nodules 2/4/2016    Shortness of breath 2/4/2016    Type 2 diabetes mellitus with diabetic chronic kidney disease ([facility] 11/3/2015    Type 2 diabetes mellitus with stage 3 chronic kidney disease ([facility] 2/4/2016    Type 2 diabetes mellitus with stage 3 chronic kidney disease, without long-term current use of insulin ([facility] 8/5/2016    Vitamin D deficiency 8/21/2017       Social History:   TOBACCO:   reports that he has quit smoking. His smoking use included cigarettes. He has never used smokeless tobacco.    ETOH:   reports no history of alcohol use. Current Medications:    Prior to Admission medications    Medication Sig Start Date End Date Taking?  Authorizing Provider   torsemide (DEMADEX) 100 MG tablet Take 1 tablet (100 mg) in the morning and 1/2 tablet (50 mg) in the late afternoon 5/26/21  Yes Lina Artis MD   allopurinol (ZYLOPRIM) 100 MG tablet TAKE TWO TABLETS BY MOUTH DAILY 5/26/21  Yes Lina Artis MD   Semaglutide 3 MG TABS Take 3 mg by mouth daily 5/26/21  Yes Lina Artis MD   SITagliptin (JANUVIA) 50 MG tablet Take 1 tablet by mouth daily 1/28/21  Yes Lina Artis MD   potassium chloride (KLOR-CON M) 20 MEQ extended release tablet Take 0.5 tablets by mouth daily 1/25/21  Yes Lina Artis MD   tamsulosin (FLOMAX) 0.4 MG capsule Take 1 capsule by mouth daily 12/18/20  Yes Jacob Carreno MD   ACCU-CHECAMI MEAGAN PLUS strip USE TO TEST THREE TIMES A DAY BEFORE MEALS 12/18/20  Yes Jacob Carreno MD   carvedilol (COREG) 25 MG tablet Take 1 tablet by mouth 2 times daily 12/18/20  Yes Jacob Carreno MD   levothyroxine (SYNTHROID) 25 MCG tablet Take 1 tablet by mouth daily 12/18/20  Yes Jacob Carreno MD   famotidine (PEPCID) 20 MG tablet Take 1 tablet by mouth daily 12/18/20  Yes Jacob Carreno MD   simvastatin (ZOCOR) 20 MG tablet Take 1 tablet by mouth nightly 9/24/20  Yes Jacob Carreno MD   ferrous sulfate (IRON 325) 325 (65 Fe) MG tablet Take 1 tablet by mouth every other day 9/24/20  Yes Jacob Carreno MD   Umeclidinium Bromide (INCRUSE ELLIPTA) 62.5 MCG/INH AEPB Inhale 1 puff into the lungs daily 9/24/20  Yes Jacob Carreno MD   aspirin EC 81 MG EC tablet Take 1 tablet by mouth daily With food. 9/24/20  Yes Jacob Carreno MD   Insulin Pen Needle 32G X 4 MM MISC Use daily as directed 11/14/18  Yes MD BRITNEY Calloway SOFTCLIX LANCETS MISC Check 3 times day before meals 10/16/18  Yes Jacob Carreno MD   albuterol sulfate HFA (PROAIR HFA) 108 (90 Base) MCG/ACT inhaler Inhale 2 puffs into the lungs every 6 hours as needed for Wheezing or Shortness of Breath 7/6/17  Yes Isma Moseley MD   Multiple Vitamin (MULTIVITAMIN PO) Take 1 tablet by mouth daily. 5/24/10  Yes Isma Moseley MD       REVIEW OF SYSTEMS:  Review of Systems   Constitutional: Negative for activity change, appetite change, chills, fever and unexpected weight change. HENT: Negative for hearing loss. Eyes: Negative for visual disturbance. Respiratory: Negative for chest tightness and shortness of breath. Cardiovascular: Negative for chest pain. Gastrointestinal: Negative for abdominal pain, nausea and vomiting. Genitourinary: Negative for hematuria.    Skin: Negative for difficulties. Additional patients instructions (if given): There are no Patient Instructions on file for this visit. Makenzie Luna MD, M.D.   5/26/2021, 2:35 PM      NOTE: This report was transcribed using voice recognition software. Every effort was made to ensure accuracy, however, inadvertent computerized transcription errors may be present.

## 2021-05-27 LAB
A/G RATIO: 1.4 (ref 1.1–2.2)
ALBUMIN SERPL-MCNC: 4.2 G/DL (ref 3.4–5)
ALP BLD-CCNC: 57 U/L (ref 40–129)
ALT SERPL-CCNC: <5 U/L (ref 10–40)
ANION GAP SERPL CALCULATED.3IONS-SCNC: 13 MMOL/L (ref 3–16)
AST SERPL-CCNC: 8 U/L (ref 15–37)
BASOPHILS ABSOLUTE: 0 K/UL (ref 0–0.2)
BASOPHILS RELATIVE PERCENT: 0.6 %
BILIRUB SERPL-MCNC: 0.5 MG/DL (ref 0–1)
BUN BLDV-MCNC: 17 MG/DL (ref 7–20)
CALCIUM SERPL-MCNC: 10 MG/DL (ref 8.3–10.6)
CHLORIDE BLD-SCNC: 103 MMOL/L (ref 99–110)
CO2: 29 MMOL/L (ref 21–32)
CREAT SERPL-MCNC: 1.9 MG/DL (ref 0.8–1.3)
EOSINOPHILS ABSOLUTE: 0.3 K/UL (ref 0–0.6)
EOSINOPHILS RELATIVE PERCENT: 5 %
ESTIMATED AVERAGE GLUCOSE: 151.3 MG/DL
GFR AFRICAN AMERICAN: 41
GFR NON-AFRICAN AMERICAN: 34
GLOBULIN: 3.1 G/DL
GLUCOSE BLD-MCNC: 93 MG/DL (ref 70–99)
HBA1C MFR BLD: 6.9 %
HCT VFR BLD CALC: 28.2 % (ref 40.5–52.5)
HEMOGLOBIN: 8.3 G/DL (ref 13.5–17.5)
LYMPHOCYTES ABSOLUTE: 1.4 K/UL (ref 1–5.1)
LYMPHOCYTES RELATIVE PERCENT: 26.1 %
MCH RBC QN AUTO: 22.1 PG (ref 26–34)
MCHC RBC AUTO-ENTMCNC: 29.3 G/DL (ref 31–36)
MCV RBC AUTO: 75.5 FL (ref 80–100)
MONOCYTES ABSOLUTE: 0.5 K/UL (ref 0–1.3)
MONOCYTES RELATIVE PERCENT: 10.4 %
NEUTROPHILS ABSOLUTE: 3 K/UL (ref 1.7–7.7)
NEUTROPHILS RELATIVE PERCENT: 57.9 %
PDW BLD-RTO: 19 % (ref 12.4–15.4)
PLATELET # BLD: 195 K/UL (ref 135–450)
PMV BLD AUTO: 7.9 FL (ref 5–10.5)
POTASSIUM SERPL-SCNC: 3.9 MMOL/L (ref 3.5–5.1)
RBC # BLD: 3.73 M/UL (ref 4.2–5.9)
SODIUM BLD-SCNC: 145 MMOL/L (ref 136–145)
TOTAL PROTEIN: 7.3 G/DL (ref 6.4–8.2)
WBC # BLD: 5.2 K/UL (ref 4–11)

## 2021-09-02 ENCOUNTER — OFFICE VISIT (OUTPATIENT)
Dept: INTERNAL MEDICINE CLINIC | Age: 86
End: 2021-09-02
Payer: MEDICARE

## 2021-09-02 VITALS
HEIGHT: 66 IN | HEART RATE: 68 BPM | SYSTOLIC BLOOD PRESSURE: 138 MMHG | DIASTOLIC BLOOD PRESSURE: 70 MMHG | OXYGEN SATURATION: 90 % | BODY MASS INDEX: 41.53 KG/M2 | TEMPERATURE: 98.4 F | WEIGHT: 258.4 LBS

## 2021-09-02 DIAGNOSIS — R79.89 ELEVATED TSH: ICD-10-CM

## 2021-09-02 DIAGNOSIS — M1A.9XX0 CHRONIC GOUT WITHOUT TOPHUS, UNSPECIFIED CAUSE, UNSPECIFIED SITE: Chronic | ICD-10-CM

## 2021-09-02 DIAGNOSIS — Z90.5 HISTORY OF NEPHRECTOMY: ICD-10-CM

## 2021-09-02 DIAGNOSIS — E04.2 GOITER, NONTOXIC, MULTINODULAR: ICD-10-CM

## 2021-09-02 DIAGNOSIS — J43.9 PULMONARY EMPHYSEMA, UNSPECIFIED EMPHYSEMA TYPE (HCC): Chronic | ICD-10-CM

## 2021-09-02 DIAGNOSIS — G47.33 OBSTRUCTIVE SLEEP APNEA: Chronic | ICD-10-CM

## 2021-09-02 DIAGNOSIS — E11.22 TYPE 2 DIABETES MELLITUS WITH STAGE 3A CHRONIC KIDNEY DISEASE, WITH LONG-TERM CURRENT USE OF INSULIN (HCC): ICD-10-CM

## 2021-09-02 DIAGNOSIS — H91.93 DECREASED HEARING OF BOTH EARS: ICD-10-CM

## 2021-09-02 DIAGNOSIS — Z79.4 TYPE 2 DIABETES MELLITUS WITH STAGE 3A CHRONIC KIDNEY DISEASE, WITH LONG-TERM CURRENT USE OF INSULIN (HCC): ICD-10-CM

## 2021-09-02 DIAGNOSIS — E66.9 DIABETES MELLITUS TYPE 2 IN OBESE (HCC): ICD-10-CM

## 2021-09-02 DIAGNOSIS — E11.69 DIABETES MELLITUS TYPE 2 IN OBESE (HCC): ICD-10-CM

## 2021-09-02 DIAGNOSIS — N18.31 TYPE 2 DIABETES MELLITUS WITH STAGE 3A CHRONIC KIDNEY DISEASE, WITH LONG-TERM CURRENT USE OF INSULIN (HCC): ICD-10-CM

## 2021-09-02 DIAGNOSIS — D64.9 ANEMIA, UNSPECIFIED TYPE: Chronic | ICD-10-CM

## 2021-09-02 DIAGNOSIS — N18.31 STAGE 3A CHRONIC KIDNEY DISEASE (HCC): Primary | ICD-10-CM

## 2021-09-02 DIAGNOSIS — I51.89 DIASTOLIC DYSFUNCTION: Chronic | ICD-10-CM

## 2021-09-02 DIAGNOSIS — R29.898 BILATERAL LEG WEAKNESS: ICD-10-CM

## 2021-09-02 PROBLEM — L03.119 CELLULITIS OF LOWER EXTREMITY: Status: RESOLVED | Noted: 2017-06-06 | Resolved: 2021-09-02

## 2021-09-02 PROBLEM — R60.1 ANASARCA: Status: RESOLVED | Noted: 2017-08-08 | Resolved: 2021-09-02

## 2021-09-02 PROCEDURE — 99214 OFFICE O/P EST MOD 30 MIN: CPT | Performed by: INTERNAL MEDICINE

## 2021-09-02 PROCEDURE — 4040F PNEUMOC VAC/ADMIN/RCVD: CPT | Performed by: INTERNAL MEDICINE

## 2021-09-02 PROCEDURE — G8926 SPIRO NO PERF OR DOC: HCPCS | Performed by: INTERNAL MEDICINE

## 2021-09-02 PROCEDURE — 3023F SPIROM DOC REV: CPT | Performed by: INTERNAL MEDICINE

## 2021-09-02 PROCEDURE — G8417 CALC BMI ABV UP PARAM F/U: HCPCS | Performed by: INTERNAL MEDICINE

## 2021-09-02 PROCEDURE — G8427 DOCREV CUR MEDS BY ELIG CLIN: HCPCS | Performed by: INTERNAL MEDICINE

## 2021-09-02 PROCEDURE — 1036F TOBACCO NON-USER: CPT | Performed by: INTERNAL MEDICINE

## 2021-09-02 PROCEDURE — 1123F ACP DISCUSS/DSCN MKR DOCD: CPT | Performed by: INTERNAL MEDICINE

## 2021-09-02 RX ORDER — BLOOD SUGAR DIAGNOSTIC
STRIP MISCELLANEOUS
Qty: 100 STRIP | Refills: 1 | Status: SHIPPED | OUTPATIENT
Start: 2021-09-02 | End: 2022-08-20 | Stop reason: SDUPTHER

## 2021-09-02 RX ORDER — LANCETS
EACH MISCELLANEOUS
Qty: 100 EACH | Refills: 3 | Status: SHIPPED | OUTPATIENT
Start: 2021-09-02

## 2021-09-02 SDOH — ECONOMIC STABILITY: FOOD INSECURITY: WITHIN THE PAST 12 MONTHS, YOU WORRIED THAT YOUR FOOD WOULD RUN OUT BEFORE YOU GOT MONEY TO BUY MORE.: NEVER TRUE

## 2021-09-02 SDOH — ECONOMIC STABILITY: FOOD INSECURITY: WITHIN THE PAST 12 MONTHS, THE FOOD YOU BOUGHT JUST DIDN'T LAST AND YOU DIDN'T HAVE MONEY TO GET MORE.: NEVER TRUE

## 2021-09-02 ASSESSMENT — PATIENT HEALTH QUESTIONNAIRE - PHQ9
SUM OF ALL RESPONSES TO PHQ9 QUESTIONS 1 & 2: 0
SUM OF ALL RESPONSES TO PHQ QUESTIONS 1-9: 0
1. LITTLE INTEREST OR PLEASURE IN DOING THINGS: 0
SUM OF ALL RESPONSES TO PHQ QUESTIONS 1-9: 0
SUM OF ALL RESPONSES TO PHQ QUESTIONS 1-9: 0
2. FEELING DOWN, DEPRESSED OR HOPELESS: 0

## 2021-09-02 ASSESSMENT — ENCOUNTER SYMPTOMS
ABDOMINAL PAIN: 0
SHORTNESS OF BREATH: 0

## 2021-09-02 ASSESSMENT — SOCIAL DETERMINANTS OF HEALTH (SDOH): HOW HARD IS IT FOR YOU TO PAY FOR THE VERY BASICS LIKE FOOD, HOUSING, MEDICAL CARE, AND HEATING?: NOT VERY HARD

## 2021-09-02 NOTE — PROGRESS NOTES
[facility] Internal Medicine  Establish care visit   2021    Kristen Garces (:  1934) senia 80 y.o. male, here to establish care. No chief complaint on file. Patient Active Problem List   Diagnosis    Obstructive sleep apnea    Chronic kidney disease, stage III (moderate) (HCC)    Hyperlipidemia    Insomnia    Proteinuria    Arthralgia    Anemia    Fatigue    Dyspnea on exertion    Bilateral leg weakness    Osteoarthritis    Diastolic dysfunction    Elevated PSA    Bell's palsy    Hypoxia    Decreased hearing    Chronic gout without tophus    Essential hypertension    Benign non-nodular prostatic hyperplasia with lower urinary tract symptoms    Gastroesophageal reflux disease without esophagitis    Pulmonary nodules    Hepatic cyst    Coronary artery calcification seen on CT scan    Difficulty walking    Diabetes mellitus type 2 in obese ([address]    Goiter, nontoxic, multinodular    Primary osteoarthritis of right knee    Primary osteoarthritis of left knee    Morbid obesity due to excess calories (HCC)    Pulmonary emphysema (HCC)    Venous stasis dermatitis of both lower extremities    Antineutrophil cytoplasmic antibody (ANCA) positive    Vitamin D deficiency    Hypercalcemia       HPI  80years old man with multiple comorbidities including diabetes type 2, CAD, diastolic heart failure, CKD stage III, s/p nephrectomy years ago, HTN, HLD, BPH, anemia, AVEL, establishing care. Accompanied by his daughter Palmira Huffman who is here to help make sure he understands everything given hearing impairment. Diabetes had been overall well controlled, currently not on any medications, reports fasting sugar ranging 113-121. Does not bring log with him. His last echo in  showed grade 1 diastolic dysfunction, he is unaware of this diagnosis.   Denies chest pain or discomfort, dyspnea, dyspnea on exertion (but mobilizes with a wheelchair given chronic knee pain), worsening swelling, orthopnea or PND. He follows with urology Dr. Job Turner. Used to see Dr. Sabino Locoly with nephrology but has not seen him in many years  Reports saw GI, he cannot remember his physician's name and had 3 colonoscopies. Was told no need for further testing. Noted worsening anemia on last labs, per patient and daughter saw his GI doctor since as recommended by his prior PCP. ROS  Review of Systems   Constitutional: Negative for unexpected weight change. Respiratory: Negative for shortness of breath. Cardiovascular: Positive for leg swelling. Negative for chest pain and palpitations. Gastrointestinal: Negative for abdominal pain. Musculoskeletal: Positive for arthralgias. HISTORIES  Current Outpatient Medications on File Prior to Visit   Medication Sig Dispense Refill    torsemide (DEMADEX) 100 MG tablet Take 1 tablet (100 mg) in the morning and 1/2 tablet (50 mg) in the late afternoon 135 tablet 3    allopurinol (ZYLOPRIM) 100 MG tablet TAKE TWO TABLETS BY MOUTH DAILY 180 tablet 3    potassium chloride (KLOR-CON M) 20 MEQ extended release tablet Take 0.5 tablets by mouth daily 90 tablet 3    tamsulosin (FLOMAX) 0.4 MG capsule Take 1 capsule by mouth daily 90 capsule 3    carvedilol (COREG) 25 MG tablet Take 1 tablet by mouth 2 times daily 180 tablet 3    levothyroxine (SYNTHROID) 25 MCG tablet Take 1 tablet by mouth daily 90 tablet 3    famotidine (PEPCID) 20 MG tablet Take 1 tablet by mouth daily 90 tablet 3    simvastatin (ZOCOR) 20 MG tablet Take 1 tablet by mouth nightly 90 tablet 3    ferrous sulfate (IRON 325) 325 (65 Fe) MG tablet Take 1 tablet by mouth every other day 45 tablet 0    aspirin EC 81 MG EC tablet Take 1 tablet by mouth daily With food.  90 tablet 3    albuterol sulfate HFA (PROAIR HFA) 108 (90 Base) MCG/ACT inhaler Inhale 2 puffs into the lungs every 6 hours as needed for Wheezing or Shortness of Breath 1 Inhaler 12    Multiple Vitamin (MULTIVITAMIN PO) Take 1 tablet by mouth daily. No current facility-administered medications on file prior to visit.         No Known Allergies    Past Medical History:   Diagnosis Date    Anemia 4/9/2012    Antineutrophil cytoplasmic antibody (ANCA) positive 8/21/2017    Atelectasis of left lung 2/18/2016    Bell's palsy 7/24/2015    Left sided - July 2015    Benign non-nodular prostatic hyperplasia with lower urinary tract symptoms 11/3/2015    BPH (benign prostatic hypertrophy) 7/5/2011    Chronic gout without tophus 11/3/2015    Chronic kidney disease, stage III (moderate) (HCC)     Chronic pain of both knees 11/8/2016    Coronary artery calcification seen on CT scan 2/18/2016    Diabetes mellitus, type 2 ([address]     Diastolic dysfunction 7/7/1646    Dyspnea on exertion 2/7/2014    Edema 1/8/2013    Elevated PSA 5/7/2015    Essential hypertension 11/3/2015    Gastroesophageal reflux disease without esophagitis 11/3/2015    GERD (gastroesophageal reflux disease) 7/5/2011    Gout 9/22/2011    Hepatic cyst 2/18/2016    Hypercalcemia 8/24/2017    Hyperlipidemia     Hypertension     Insomnia 6/29/2010    Localized edema 9/20/2016    Morbid obesity due to excess calories ([address] 11/8/2016    Multiple thyroid nodules 2/18/2016    Nocturia 5/7/2015    Obesity     Obstructive sleep apnea     Primary osteoarthritis of left knee 11/8/2016    Primary osteoarthritis of right knee 11/8/2016    Proteinuria 9/27/2010    Pulmonary emphysema ([address] 2/8/2017    Pulmonary nodules 2/4/2016    Shortness of breath 2/4/2016    Type 2 diabetes mellitus with diabetic chronic kidney disease ([address] 11/3/2015    Type 2 diabetes mellitus with stage 3 chronic kidney disease ([address] 2/4/2016    Type 2 diabetes mellitus with stage 3 chronic kidney disease, without long-term current use of insulin ([address] 8/5/2016    Vitamin D deficiency 8/21/2017       Patient Active Problem List   Diagnosis    Obstructive sleep apnea    Chronic kidney disease, stage III (moderate) (HCC)    Hyperlipidemia    Insomnia    Proteinuria    Arthralgia    Anemia    Fatigue    Dyspnea on exertion    Bilateral leg weakness    Osteoarthritis    Diastolic dysfunction    Elevated PSA    Bell's palsy    Hypoxia    Decreased hearing    Chronic gout without tophus    Essential hypertension    Benign non-nodular prostatic hyperplasia with lower urinary tract symptoms    Gastroesophageal reflux disease without esophagitis    Pulmonary nodules    Hepatic cyst    Coronary artery calcification seen on CT scan    Difficulty walking    Diabetes mellitus type 2 in obese (HCC)    Goiter, nontoxic, multinodular    Primary osteoarthritis of right knee    Primary osteoarthritis of left knee    Morbid obesity due to excess calories (HCC)    Pulmonary emphysema (HCC)    Venous stasis dermatitis of both lower extremities    Antineutrophil cytoplasmic antibody (ANCA) positive    Vitamin D deficiency    Hypercalcemia       Past Surgical History:   Procedure Laterality Date    APPENDECTOMY      COLONOSCOPY      Dr. Otto Wick    CYST REMOVAL      TN COLONOSCOPY FLX DX W/COLLJ Bertha 1978 PFRMD N/A 11/6/2018    COLONOSCOPY WITH MAC performed by Keturah Krabbe, MD at [facility]      left sided - August 2000    TURP      August 7675   [facility] UMBILICAL HERNIA REPAIR      August 2000    UPPER GASTROINTESTINAL ENDOSCOPY N/A 11/6/2018    EGD BIOPSY Forcep biopsy of gastric R/O H. 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    Occupation: retired - age 72     Comment: as of May 10, 2013   Tobacco Use    Smoking status: Former Smoker     Types: Cigarettes    Smokeless tobacco: Never Used    Tobacco comment: quit smoking in    Substance and Sexual Activity    Alcohol use: No    Drug use: No    Sexual activity: Never     Comment:  -    Other Topics Concern    Not on file   Social History Narrative    Past Surgical History     Appendectomy    Hernia Surgery: umbilical hernia repair - 2000    TURP: 2000    Cervical Cyst Removed    left nephrectomy - 2000                                                     Last updated by Hal Bains MD on 10/16/2008              Social History       Marital Status:  ()    Children: 1     Employment Status: retired -     Occupation:     Caffeine per Day: one cup of coffee     Alcohol Use: none    Drug Use: none    Tobacco Usage: prior smoker    Cigarettes - Year Quit:                                                 Last updated by Rafael Muñoz MD on 2014                      Family History     mother -  - age 55 - 56 - heart attack    father -  - age 67 -  in his sleep        brother -  - age 61 - 5 - head injury with need for a plate, seizures    brother - Keanu Olmedo - abnormal weight loss        sister - Charla - throat cancer (smoker) (age 62)        son - Tu Amezquita - obesity                                 Last updated by Hal Bains MD on 01/15/2009      Social Determinants of Health     Financial Resource Strain: Low Risk     Difficulty of Paying Living Expenses: Not very hard   Food Insecurity: No Food Insecurity    Worried About Running Out of Food in the Last Year: Never true    Gonzalo of Food in the Last Year: Never true   Transportation Needs:     Lack of Transportation (Medical):      Lack of Transportation (Non-Medical):    Physical Activity:     Days of Exercise per Week:     Minutes of Exercise per Session:    Stress:     Feeling of Stress :    Social Connections:     Frequency of Communication with Friends and Family:  Frequency of Social Gatherings with Friends and Family:     Attends Religion Services:     Active Member of Clubs or Organizations:     Attends Club or Organization Meetings:     Marital Status:    Intimate Partner Violence:     Fear of Current or Ex-Partner:     Emotionally Abused:     Physically Abused:     Sexually Abused:         Family History   Problem Relation Age of Onset    Cancer Sister         throat    Substance Abuse Sister         smoker    Heart Disease Mother     Heart Attack Mother     Stroke Brother     Seizures Brother        PE  Vitals:    09/02/21 1437   BP: 138/70   Pulse: 68   Temp: 98.4 °F (36.9 °C)   SpO2: 90%   Weight: 258 lb 6.4 oz (117.2 kg)   Height: 5' 6\" (1.676 m)     Estimated body mass index is 41.71 kg/m² as calculated from the following:    Height as of this encounter: 5' 6\" (1.676 m). Weight as of this encounter: 258 lb 6.4 oz (117.2 kg). Physical Exam  Constitutional:       General: He is not in acute distress. Appearance: Normal appearance. He is not ill-appearing, toxic-appearing or diaphoretic. HENT:      Head: Normocephalic and atraumatic. Eyes:      Extraocular Movements: Extraocular movements intact. Conjunctiva/sclera: Conjunctivae normal.      Pupils: Pupils are equal, round, and reactive to light. Cardiovascular:      Rate and Rhythm: Normal rate and regular rhythm. Heart sounds: Normal heart sounds. Pulmonary:      Effort: Pulmonary effort is normal. No respiratory distress. Breath sounds: Rales present. Musculoskeletal:      Right lower leg: Edema present. Left lower leg: Edema present. Skin:     General: Skin is warm and dry. Neurological:      Mental Status: He is alert. Sensory: No sensory deficit. Motor: No weakness.          Immunization History   Administered Date(s) Administered    COVID-19, Moderna, PF, 100mcg/0.5mL 01/31/2021, 02/28/2021    FLUZONE 3 YEARS AND OVER 09/25/2014    Influenza Vaccine, unspecified formulation 09/16/2015, 09/20/2016    Influenza, High Dose (Fluzone 65 yrs and older) 09/27/2010, 09/22/2011, 10/08/2012, 09/16/2015, 09/20/2016, 08/21/2017, 10/16/2018    Influenza, Quadv, adjuvanted, 65 yrs +, IM, PF (Fluad) 09/24/2020    Influenza, Triv, inactivated, subunit, adjuvanted, IM (Fluad 65 yrs and older) 10/28/2019    Pneumococcal Conjugate 13-valent ([id number]) 09/16/2015    Pneumococcal Polysaccharide ([id number]) 04/16/2009       Health Maintenance   Topic Date Due    DTaP/Tdap/Td vaccine (1 - Tdap) Never done    Shingles Vaccine (1 of 2) Never done    PSA counseling  08/31/2016    Annual Wellness Visit (AWV)  Never done    Flu vaccine (1) 09/01/2021    Lipid screen  01/25/2022    Potassium monitoring  05/26/2022    Creatinine monitoring  05/26/2022    Pneumococcal 65+ years Vaccine  Completed    COVID-19 Vaccine  Completed    Hepatitis A vaccine  Aged Out    Hib vaccine  Aged Out    Meningococcal (ACWY) vaccine  Aged Out       PSH, PMH, SH and FH reviewed and noted. Recent and past labs, tests and consults also reviewed. Recent or new meds also reviewed. ASSESSMENT/ PLAN:    Diabetes mellitus type 2 in obese ([address]  Well-controlled, last A1c 6.9%, overall doing well from a clinical standpoint. Known CKD stage III. Currently not on treatment and is managed with lifestyle. Was on Lantus, semaglutide and Januvia before, per patient and family stopped by prior PCP.  - Repeat HbA1c. AIC goal< 7    - BP is acceptable. Not on ACE/ARB  - LDL within goal, continue simvastatin 20 mg for now  - Last foot exam: Unclear  - Last eye exam: Unclear  - Continue ASA   - low carb diet and regular exercise  - regular foot care  - repeat BMP,  urine for microalbuminuria  - uptodate w/ PSV23 vaccine.   - Follow up in 3 months    Goiter, nontoxic, multinodular  -Last thyroid ultrasound 2016-multinodular goiter, largest node 1 cm on the right without suspicious features, likely benign  -Last TSH a year ago was mildly elevated with normal FT4. Will start with repeating TSH. Pulmonary emphysema ([address]  Carries this diagnosis but no emphysematous changes seen on last CT chest done in 2016. He did have PFTs that here which were unreliable for conclusion due to inadequate performance. Today denies dyspnea/chronic cough. He was prescribed Incruse Ellipta by his prior PCP which he had not been taking  -Will not restart for now and continue to monitor clinically    Obstructive Sleep Apnea  Not on CPAP    Decreased hearing  Per daughter recently evaluated by ENT and hearing exam, and his planned for a new hearing aids. Today he is not wearing hearing aids and struggles with hearing throughout our visit    Chronic kidney disease, stage III (moderate)  Noted GFR decline on last labs. -He is status post nephrectomy, unclear why  -Follows with both urology and used to see nephrology. I asked him to go back to see his nephrologist  -Repeat urine protein    Chronic gout without tophus  Stable  -Continue allopurinol 200 mg daily    Bilateral leg weakness  In wheelchair but no significant weakness on exam today. Per daughter mobilizes with wheelchair due to chronic knee pain. Denies history of strokes    Anemia  -Repeat CBC, iron panel  -Was referred to GI by prior PCP due to hemoglobin decline, Per patients daughter had seen GI but I am unable to find documentation. Per patient had 3 colonoscopies so far and was told he does not need to repeat. Diastolic dysfunction  Noted on last echo in 2736, grade 1 diastolic with normal EF. Does not see cardiology.   Asymptomatic today.  -On torsemide 100 mg daily      Orders Placed This Encounter   Procedures    TSH WITH REFLEX TO FT4    Basic Metabolic Panel    MICROALBUMIN / CREATININE URINE RATIO    Hemoglobin A1C    Ferritin    Iron and TIBC    CBC WITH AUTO DIFFERENTIAL    AFL - Melo Martines MD, Nephrology, [facility]     Orders Placed This Encounter   Medications    ACCU-CHEK MEAGAN PLUS strip     Sig: USE TO TEST THREE TIMES A DAY BEFORE MEALS     Dispense:  100 strip     Refill:  1    Accu-Chek Softclix Lancets MISC     Sig: Check 3 times day before meals     Dispense:  100 each     Refill:  3    DISCONTD: Insulin Pen Needle 32G X 4 MM MISC     Sig: Use daily as directed     Dispense:  100 each     Refill:  5      Medications Discontinued During This Encounter   Medication Reason    Umeclidinium Bromide (INCRUSE ELLIPTA) 62.5 MCG/INH AEPB Patient Choice    Semaglutide 3 MG TABS LIST CLEANUP    SITagliptin (JANUVIA) 50 MG tablet LIST CLEANUP    ACCU-CHEK SOFTCLIX LANCETS MISC REORDER    Insulin Pen Needle 32G X 4 MM MISC REORDER    ACCU-CHEK MEAGAN PLUS strip REORDER    Insulin Pen Needle 32G X 4 MM MISC Therapy completed        Return in about 3 months (around 12/2/2021) for Medicare wellness exam.    Mitchell Toribio MD    This dictation was generated by voice recognition computer software. Although all attempts are made to edit the dictation for accuracy, there may be errors in the transcription that are not intended.

## 2021-09-02 NOTE — ASSESSMENT & PLAN NOTE
-Repeat CBC, iron panel  -Was referred to GI by prior PCP due to hemoglobin decline, Per patients daughter had seen GI but I am unable to find documentation. Per patient had 3 colonoscopies so far and was told he does not need to repeat.

## 2021-09-02 NOTE — ASSESSMENT & PLAN NOTE
Noted GFR decline on last labs. -He is status post nephrectomy, unclear why  -Follows with both urology and used to see nephrology.   I asked him to go back to see his nephrologist  -Repeat urine protein

## 2021-09-02 NOTE — ASSESSMENT & PLAN NOTE
Well-controlled, last A1c 6.9%, overall doing well from a clinical standpoint. Known CKD stage III. Currently not on treatment and is managed with lifestyle. Was on Lantus, semaglutide and Januvia before, per patient and family stopped by prior PCP.  - Repeat HbA1c. AIC goal< 7    - BP is acceptable. Not on ACE/ARB  - LDL within goal, continue simvastatin 20 mg for now  - Last foot exam: Unclear  - Last eye exam: Unclear  - Continue ASA   - low carb diet and regular exercise  - regular foot care  - repeat BMP,  urine for microalbuminuria  - uptodate w/ PSV23 vaccine.   - Follow up in 3 months

## 2021-09-02 NOTE — ASSESSMENT & PLAN NOTE
Noted on last echo in 7984, grade 1 diastolic with normal EF. Does not see cardiology.   Asymptomatic today.  -On torsemide 100 mg daily

## 2021-09-02 NOTE — ASSESSMENT & PLAN NOTE
In wheelchair but no significant weakness on exam today. Per daughter mobilizes with wheelchair due to chronic knee pain.   Denies history of strokes

## 2021-09-02 NOTE — ASSESSMENT & PLAN NOTE
Carries this diagnosis but no emphysematous changes seen on last CT chest done in 2016. He did have PFTs that here which were unreliable for conclusion due to inadequate performance. Today denies dyspnea/chronic cough.   He was prescribed Incruse Ellipta by his prior PCP which he had not been taking  -Will not restart for now and continue to monitor clinically

## 2021-09-08 RX ORDER — LANOLIN ALCOHOL/MO/W.PET/CERES
325 CREAM (GRAM) TOPICAL 2 TIMES DAILY
Qty: 90 TABLET | Refills: 3 | Status: SHIPPED
Start: 2021-09-08 | End: 2021-09-08

## 2021-09-08 RX ORDER — LANOLIN ALCOHOL/MO/W.PET/CERES
325 CREAM (GRAM) TOPICAL 2 TIMES DAILY
Qty: 90 TABLET | Refills: 3 | Status: SHIPPED | OUTPATIENT
Start: 2021-09-08 | End: 2021-09-09 | Stop reason: SDUPTHER

## 2021-09-09 RX ORDER — LANOLIN ALCOHOL/MO/W.PET/CERES
1 CREAM (GRAM) TOPICAL EVERY OTHER DAY
Qty: 45 TABLET | Refills: 0 | Status: SHIPPED
Start: 2021-09-09 | End: 2022-07-11 | Stop reason: CLARIF

## 2021-10-04 ENCOUNTER — TELEPHONE (OUTPATIENT)
Dept: ORTHOPEDIC SURGERY | Age: 86
End: 2021-10-04

## 2021-10-04 NOTE — TELEPHONE ENCOUNTER
Dear PCP Provider: [facility]) has partnered with [facility] to utilize predictive analytics to improve the care management for patients with arthritic knee pain. Utilizing claims data and patient visit features, we have developed an algorithmic risk score to determine which patient's quality of life may be most impacted to their knee pain. The selection criteria for this  program are: 1) risk score greater than .85; 2) existing [address] [place] [address] Floor patient; and 3) seen for knee pain in the past 3 years. Based upon the predictive analytics risk score  program, your patient has a risk score of greater than . 85 (i.e. 85% probability in having their quality of life impacted by their knee pain). To assist with care management of your patient, a navigator will be contacting them to understand their knee pain status and to educate on the [address] Pain Program, including scheduling an appointment with a joint specialist or their PCP. If you feel this patient not appropriate to be contacted given other medical reasons, please reply back to the navigator within 7 days with reason why not to be contacted. Otherwise, the navigator will follow up with you on the details of the patient encounter after the call.  Thank you for your support for this program.

## 2021-10-12 ENCOUNTER — TELEPHONE (OUTPATIENT)
Dept: ORTHOPEDIC SURGERY | Age: 86
End: 2021-10-12

## 2021-11-15 ENCOUNTER — CLINICAL DOCUMENTATION (OUTPATIENT)
Dept: OTHER | Age: 86
End: 2021-11-15

## 2021-12-01 LAB
A/G RATIO: 1.4 (ref 1.1–2.2)
ALBUMIN SERPL-MCNC: 4.2 G/DL (ref 3.4–5)
ALP BLD-CCNC: 69 U/L (ref 40–129)
ALT SERPL-CCNC: <5 U/L (ref 10–40)
ANION GAP SERPL CALCULATED.3IONS-SCNC: 12 MMOL/L (ref 3–16)
AST SERPL-CCNC: 7 U/L (ref 15–37)
BACTERIA: ABNORMAL /HPF
BASOPHILS ABSOLUTE: 0 K/UL (ref 0–0.2)
BASOPHILS RELATIVE PERCENT: 0.3 %
BILIRUB SERPL-MCNC: 0.4 MG/DL (ref 0–1)
BILIRUBIN URINE: NEGATIVE
BLOOD, URINE: ABNORMAL
BUN BLDV-MCNC: 14 MG/DL (ref 7–20)
CALCIUM SERPL-MCNC: 10.1 MG/DL (ref 8.3–10.6)
CHLORIDE BLD-SCNC: 100 MMOL/L (ref 99–110)
CLARITY: ABNORMAL
CO2: 31 MMOL/L (ref 21–32)
COLOR: YELLOW
CREAT SERPL-MCNC: 1.5 MG/DL (ref 0.8–1.3)
CREATININE URINE: 58.4 MG/DL (ref 39–259)
EOSINOPHILS ABSOLUTE: 0.2 K/UL (ref 0–0.6)
EOSINOPHILS RELATIVE PERCENT: 6 %
EPITHELIAL CELLS, UA: 2 /HPF (ref 0–5)
GFR AFRICAN AMERICAN: 53
GFR NON-AFRICAN AMERICAN: 44
GLUCOSE BLD-MCNC: 109 MG/DL (ref 70–99)
GLUCOSE URINE: NEGATIVE MG/DL
HCT VFR BLD CALC: 36.9 % (ref 40.5–52.5)
HEMOGLOBIN: 11.1 G/DL (ref 13.5–17.5)
HYALINE CASTS: 13 /LPF (ref 0–8)
KETONES, URINE: NEGATIVE MG/DL
LEUKOCYTE ESTERASE, URINE: ABNORMAL
LYMPHOCYTES ABSOLUTE: 1.2 K/UL (ref 1–5.1)
LYMPHOCYTES RELATIVE PERCENT: 29.2 %
MCH RBC QN AUTO: 25.8 PG (ref 26–34)
MCHC RBC AUTO-ENTMCNC: 30.2 G/DL (ref 31–36)
MCV RBC AUTO: 85.5 FL (ref 80–100)
MICROSCOPIC EXAMINATION: YES
MONOCYTES ABSOLUTE: 0.4 K/UL (ref 0–1.3)
MONOCYTES RELATIVE PERCENT: 9.4 %
NEUTROPHILS ABSOLUTE: 2.2 K/UL (ref 1.7–7.7)
NEUTROPHILS RELATIVE PERCENT: 55.1 %
NITRITE, URINE: NEGATIVE
PDW BLD-RTO: 20.5 % (ref 12.4–15.4)
PH UA: 7 (ref 5–8)
PLATELET # BLD: 198 K/UL (ref 135–450)
PMV BLD AUTO: 8.3 FL (ref 5–10.5)
POTASSIUM SERPL-SCNC: 3.8 MMOL/L (ref 3.5–5.1)
PROTEIN PROTEIN: 29 MG/DL
PROTEIN UA: ABNORMAL MG/DL
PROTEIN/CREAT RATIO: 0.5 MG/DL
RBC # BLD: 4.31 M/UL (ref 4.2–5.9)
RBC UA: 3 /HPF (ref 0–4)
SODIUM BLD-SCNC: 143 MMOL/L (ref 136–145)
SPECIFIC GRAVITY UA: 1.01 (ref 1–1.03)
TOTAL PROTEIN: 7.3 G/DL (ref 6.4–8.2)
URIC ACID, SERUM: 5.8 MG/DL (ref 3.5–7.2)
URINE TYPE: ABNORMAL
UROBILINOGEN, URINE: 0.2 E.U./DL
VITAMIN D 25-HYDROXY: 22.8 NG/ML
WBC # BLD: 4.1 K/UL (ref 4–11)
WBC UA: 50 /HPF (ref 0–5)

## 2021-12-21 ENCOUNTER — OFFICE VISIT (OUTPATIENT)
Dept: INTERNAL MEDICINE CLINIC | Age: 86
End: 2021-12-21
Payer: MEDICARE

## 2021-12-21 VITALS
TEMPERATURE: 97.3 F | HEART RATE: 57 BPM | DIASTOLIC BLOOD PRESSURE: 80 MMHG | HEIGHT: 66 IN | OXYGEN SATURATION: 93 % | SYSTOLIC BLOOD PRESSURE: 152 MMHG | BODY MASS INDEX: 41.71 KG/M2

## 2021-12-21 DIAGNOSIS — Z00.00 ROUTINE GENERAL MEDICAL EXAMINATION AT A HEALTH CARE FACILITY: Primary | ICD-10-CM

## 2021-12-21 DIAGNOSIS — R60.0 LOCALIZED EDEMA: Chronic | ICD-10-CM

## 2021-12-21 DIAGNOSIS — N40.1 BENIGN NON-NODULAR PROSTATIC HYPERPLASIA WITH LOWER URINARY TRACT SYMPTOMS: Chronic | ICD-10-CM

## 2021-12-21 DIAGNOSIS — I10 ESSENTIAL HYPERTENSION: Chronic | ICD-10-CM

## 2021-12-21 DIAGNOSIS — E04.2 MULTIPLE THYROID NODULES: Chronic | ICD-10-CM

## 2021-12-21 PROCEDURE — 4040F PNEUMOC VAC/ADMIN/RCVD: CPT | Performed by: HOSPITALIST

## 2021-12-21 PROCEDURE — 1123F ACP DISCUSS/DSCN MKR DOCD: CPT | Performed by: HOSPITALIST

## 2021-12-21 PROCEDURE — G0439 PPPS, SUBSEQ VISIT: HCPCS | Performed by: HOSPITALIST

## 2021-12-21 PROCEDURE — G8484 FLU IMMUNIZE NO ADMIN: HCPCS | Performed by: HOSPITALIST

## 2021-12-21 RX ORDER — FAMOTIDINE 20 MG/1
20 TABLET, FILM COATED ORAL DAILY
Qty: 90 TABLET | Refills: 3 | Status: SHIPPED
Start: 2021-12-21 | End: 2022-07-11 | Stop reason: CLARIF

## 2021-12-21 RX ORDER — TORSEMIDE 100 MG/1
TABLET ORAL
Qty: 135 TABLET | Refills: 3 | Status: SHIPPED | OUTPATIENT
Start: 2021-12-21

## 2021-12-21 RX ORDER — LEVOTHYROXINE SODIUM 0.03 MG/1
25 TABLET ORAL DAILY
Qty: 90 TABLET | Refills: 3 | Status: SHIPPED | OUTPATIENT
Start: 2021-12-21 | End: 2022-07-11 | Stop reason: ALTCHOICE

## 2021-12-21 RX ORDER — CARVEDILOL 25 MG/1
25 TABLET ORAL 2 TIMES DAILY
Qty: 180 TABLET | Refills: 3 | Status: SHIPPED | OUTPATIENT
Start: 2021-12-21

## 2021-12-21 RX ORDER — TAMSULOSIN HYDROCHLORIDE 0.4 MG/1
0.4 CAPSULE ORAL DAILY
Qty: 90 CAPSULE | Refills: 3 | Status: SHIPPED | OUTPATIENT
Start: 2021-12-21

## 2021-12-21 ASSESSMENT — LIFESTYLE VARIABLES
AUDIT-C TOTAL SCORE: INCOMPLETE
AUDIT TOTAL SCORE: INCOMPLETE
HOW OFTEN DO YOU HAVE A DRINK CONTAINING ALCOHOL: NEVER
HOW OFTEN DO YOU HAVE A DRINK CONTAINING ALCOHOL: 0

## 2021-12-21 ASSESSMENT — PATIENT HEALTH QUESTIONNAIRE - PHQ9
1. LITTLE INTEREST OR PLEASURE IN DOING THINGS: 0
2. FEELING DOWN, DEPRESSED OR HOPELESS: 0
SUM OF ALL RESPONSES TO PHQ QUESTIONS 1-9: 0
SUM OF ALL RESPONSES TO PHQ QUESTIONS 1-9: 0
SUM OF ALL RESPONSES TO PHQ9 QUESTIONS 1 & 2: 0
SUM OF ALL RESPONSES TO PHQ QUESTIONS 1-9: 0

## 2021-12-21 NOTE — PROGRESS NOTES
Medicare Annual Wellness Visit  Name: Edi Covert Date: 2021   MRN: <[id number]> Sex: Male   Age: 80 y.o. Ethnicity: Non- / Non    : 1934 Race: Steph James / Anyi Stout is here for Medicare AWV and Medication Refill    Screenings for behavioral, psychosocial and functional/safety risks, and cognitive dysfunction are all negative except as indicated below. These results, as well as other patient data from the [address] form, are documented in Flowsheets linked to this Encounter. No Known Allergies    Prior to Visit Medications    Medication Sig Taking? Authorizing Provider   torsemide (DEMADEX) 100 MG tablet Take 1 tablet (100 mg) in the morning and 1/2 tablet (50 mg) in the late afternoon Yes Robert Crandall MD   tamsulosin (FLOMAX) 0.4 MG capsule Take 1 capsule by mouth daily Yes Robert Crandall MD   carvedilol (COREG) 25 MG tablet Take 1 tablet by mouth 2 times daily Yes Robert Crandall MD   levothyroxine (SYNTHROID) 25 MCG tablet Take 1 tablet by mouth daily Yes Robert Crandall MD   famotidine (PEPCID) 20 MG tablet Take 1 tablet by mouth daily Yes Robert Crandall MD   ferrous sulfate (FE TABS) 325 (65 Fe) MG EC tablet Take 1 tablet by mouth every other day Correction to last RX sent Yes Fredi Wilde MD   ACCU-CHEBERHANE MEAGAN PLUS strip USE TO TEST THREE TIMES A DAY BEFORE MEALS Yes Fredi Wilde MD   Accu-Cheberhane Softclix Lancets MISC Check 3 times day before meals Yes Fredi Wilde MD   allopurinol (ZYLOPRIM) 100 MG tablet TAKE TWO TABLETS BY MOUTH DAILY Yes Adarsh Woodson MD   potassium chloride (KLOR-CON M) 20 MEQ extended release tablet Take 0.5 tablets by mouth daily Yes Adarsh Woodson MD   simvastatin (ZOCOR) 20 MG tablet Take 1 tablet by mouth nightly Yes Adarsh Woodson MD   aspirin EC 81 MG EC tablet Take 1 tablet by mouth daily With food.  Yes Adarsh Woodson MD   albuterol sulfate HFA (PROAIR HFA) 108 (90 Base) MCG/ACT inhaler Inhale 2 puffs into the lungs every 6 hours as needed for Wheezing or Shortness of Breath Yes Shanon Azevedo MD   Multiple Vitamin (MULTIVITAMIN PO) Take 1 tablet by mouth daily.  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    Atelectasis of left lung 2/18/2016    Bell's palsy 7/24/2015    Left sided - July 2015    Benign non-nodular prostatic hyperplasia with lower urinary tract symptoms 11/3/2015    BPH (benign prostatic hypertrophy) 7/5/2011    Chronic gout without tophus 11/3/2015    Chronic kidney disease, stage III (moderate) (HCC)     Chronic pain of both knees 11/8/2016    Coronary artery calcification seen on CT scan 2/18/2016    Diabetes mellitus, type 2 ([address]     Diastolic dysfunction 0/7/7367    Dyspnea on exertion 2/7/2014    Edema 1/8/2013    Elevated PSA 5/7/2015    Essential hypertension 11/3/2015    Gastroesophageal reflux disease without esophagitis 11/3/2015    GERD (gastroesophageal reflux disease) 7/5/2011    Gout 9/22/2011    Hepatic cyst 2/18/2016    Hypercalcemia 8/24/2017    Hyperlipidemia     Hypertension     Insomnia 6/29/2010    Localized edema 9/20/2016    Morbid obesity due to excess calories ([address] 11/8/2016    Multiple thyroid nodules 2/18/2016    Nocturia 5/7/2015    Obesity     Obstructive sleep apnea     Primary osteoarthritis of left knee 11/8/2016    Primary osteoarthritis of right knee 11/8/2016    Proteinuria 9/27/2010    Pulmonary emphysema ([address] 2/8/2017    Pulmonary nodules 2/4/2016    Shortness of breath 2/4/2016    Type 2 diabetes mellitus with diabetic chronic kidney disease ([address] 11/3/2015    Type 2 diabetes mellitus with stage 3 chronic kidney disease ([address] 2/4/2016    Type 2 diabetes mellitus with stage 3 chronic kidney disease, without long-term current use of insulin ([address] 8/5/2016    Vitamin D deficiency 8/21/2017       Past Surgical History:   Procedure Laterality Date    APPENDECTOMY      COLONOSCOPY      Dr. Lokesh Marques    CYST REMOVAL      MI COLONOSCOPY FLX DX W/COLLJ Bertha 1978 PFRMD N/A 11/6/2018    COLONOSCOPY WITH MAC performed by Nitin Jenkins MD at [address] [facility]      left sided - August 2000    TURP      August 6990    UMBILICAL HERNIA REPAIR      August 2000    UPPER GASTROINTESTINAL ENDOSCOPY N/A 11/6/2018    EGD BIOPSY Forcep biopsy of gastric R/O H. Pylori performed by Nitin Jenkins MD at [facility] ENDOSCOPY       Family History   Problem Relation Age of Onset    Cancer Sister         throat    Substance Abuse Sister         smoker    Heart Disease Mother     Heart Attack Mother     Stroke Brother     Seizures Brother        CareTeam (Including outside providers/suppliers regularly involved in providing care):   Patient Care Team:  Navarro Norwood MD as PCP - General (Internal Medicine)  Navarro Norwood MD as PCP - [facility] Empaneled Provider  Arian Sinha MD (Urology)  Kim Moran (Optometry)  Luz Chavez MD (Urology)  Torri Mcnamara OD (Optometry)  Mary Ann Murillo MD (Nephrology)    Wt Readings from Last 3 Encounters:   09/02/21 258 lb 6.4 oz (117.2 kg)   05/26/21 276 lb (125.2 kg)   09/24/20 279 lb (126.6 kg)     Vitals:    12/21/21 1631   BP: (!) 152/80   Site: Left Upper Arm   Pulse: 57   Temp: 97.3 °F (36.3 °C)   TempSrc: Infrared   SpO2: 93%   Height: 5' 6\" (1.676 m)     Body mass index is 41.71 kg/m². Based upon direct observation of the patient, evaluation of cognition reveals recent and remote memory intact.     General Appearance: alert and oriented to person, place and time, well developed and well- nourished, in no acute distress  Skin: warm and dry, no rash or erythema  Head: normocephalic and atraumatic  Eyes: pupils equal, round, and reactive to light, extraocular eye movements intact, conjunctivae normal  ENT: tympanic membrane, external ear and ear canal normal bilaterally, nose without deformity, nasal mucosa and turbinates normal without polyps  Neck: supple and non-tender without mass, no thyromegaly or thyroid nodules, no cervical lymphadenopathy  Pulmonary/Chest: clear to auscultation bilaterally- no wheezes, rales or rhonchi, normal air movement, no respiratory distress  Cardiovascular: normal rate, regular rhythm, normal S1 and S2, no murmurs, rubs, clicks, or gallops, distal pulses intact, no carotid bruits  Abdomen: soft, non-tender, non-distended, normal bowel sounds, no masses or organomegaly  Extremities: no cyanosis, clubbing or edema  Musculoskeletal: normal range of motion, no joint swelling, deformity or tenderness  Neurologic: reflexes normal and symmetric, no cranial nerve deficit, gait, coordination and speech normal    Patient's complete Health Risk Assessment and screening values have been reviewed and are found in Flowsheets. The following problems were reviewed today and where indicated follow up appointments were made and/or referrals ordered. Positive Risk Factor Screenings with Interventions:            General Health and ACP:  General  In general, how would you say your health is?: Good  In the past 7 days, have you experienced any of the following?  New or Increased Pain, New or Increased Fatigue, Loneliness, Social Isolation, Stress or Anger?: None of These  Do you get the social and emotional support that you need?: Yes  Do you have a Living Will?: Yes  Advance Directives     Power of  Living Will ACP-Advance Directive ACP-Power of     Not on File Filed on 05/07/19 Not on File Not on File      General Health Risk Interventions:  · No Living Will: Patient declines ACP discussion/assistance    Health Habits/Nutrition:  Health Habits/Nutrition  Do you exercise for at least 20 minutes 2-3 times per week?: Yes  Have you lost any weight without trying in the past 3 months?: No  Do you eat only one meal per day?: No  Have you seen the dentist within the past year?: (!) No     Health Habits/Nutrition Interventions:  · Inadequate physical activity:  patient agrees to exercise for at least 150 minutes/week    Hearing/Vision:  No exam data present  Hearing/Vision  Do you or your family notice any trouble with your hearing that hasn't been managed with hearing aids?: (!) Yes  Do you have difficulty driving, watching TV, or doing any of your daily activities because of your eyesight?: No  Have you had an eye exam within the past year?: Yes  Hearing/Vision Interventions:  · Hearing concerns:  patient declines any further evaluation/treatment for hearing issues    Safety:  Safety  Do you have working smoke detectors?: Yes  Have all throw rugs been removed or fastened?: Yes  Do you have non-slip mats or surfaces in all bathtubs/showers?: (!) No  Do all of your stairways have a railing or banister?: Yes  Are your doorways, halls and stairs free of clutter?: Yes  Do you always fasten your seatbelt when you are in a car?: Yes  Safety Interventions:  · Patient declines any further evaluation/treatment for this issue    ADL:  ADLs  In the past 7 days, did you need help from others to perform any of the following everyday activities? Eating, dressing, grooming, bathing, toileting, or walking/balance?: (!) Bathing  In the past 7 days, did you need help from others to take care of any of the following?  Laundry, housekeeping, banking/finances, shopping, telephone use, food preparation, transportation, or taking medications?: (!) Laundry,Housekeeping,Transportation  ADL Interventions:  · Patient declines any further evaluation/treatment for this issue      Personalized Preventive Plan   Current Health Maintenance Status  Immunization History   Administered Date(s) Administered    ALISHAID-19Ap, Primary or Immunocompromised, PF, 100mcg/0.5mL 01/31/2021, 02/28/2021    FLUZONE 3 YEARS AND OVER 09/25/2014    Influenza Vaccine, unspecified formulation 09/16/2015,

## 2021-12-21 NOTE — PATIENT INSTRUCTIONS
Personalized Preventive Plan for Rafi Llanos - 12/21/2021  Medicare offers a range of preventive health benefits. Some of the tests and screenings are paid in full while other may be subject to a deductible, co-insurance, and/or copay. Some of these benefits include a comprehensive review of your medical history including lifestyle, illnesses that may run in your family, and various assessments and screenings as appropriate. After reviewing your medical record and screening and assessments performed today your provider may have ordered immunizations, labs, imaging, and/or referrals for you. A list of these orders (if applicable) as well as your Preventive Care list are included within your After Visit Summary for your review. Other Preventive Recommendations:    · A preventive eye exam performed by an eye specialist is recommended every 1-2 years to screen for glaucoma; cataracts, macular degeneration, and other eye disorders. · A preventive dental visit is recommended every 6 months. · Try to get at least 150 minutes of exercise per week or 10,000 steps per day on a pedometer . · Order or download the FREE \"Exercise & Physical Activity: Your Everyday Guide\" from [employer]. Call 9-438.763.1971 or search [employer] online. · You need 6885-1162 mg of calcium and 0605-5358 IU of vitamin D per day. It is possible to meet your calcium requirement with diet alone, but a vitamin D supplement is usually necessary to meet this goal.  · When exposed to the sun, use a sunscreen that protects against both UVA and UVB radiation with an SPF of 30 or greater. Reapply every 2 to 3 hours or after sweating, drying off with a towel, or swimming. · Always wear a seat belt when traveling in a car. Always wear a helmet when riding a bicycle or motorcycle.

## 2022-04-18 RX ORDER — SIMVASTATIN 20 MG
20 TABLET ORAL NIGHTLY
Qty: 90 TABLET | Refills: 1 | Status: SHIPPED | OUTPATIENT
Start: 2022-04-18

## 2022-04-18 NOTE — TELEPHONE ENCOUNTER
----- Message from Cindy Gilbert sent at 4/18/2022  8:41 AM EDT -----  Subject: Refill Request    QUESTIONS  Name of Medication? simvastatin (ZOCOR) 20 MG tablet  Patient-reported dosage and instructions? unknown  How many days do you have left? 0  [id number]  Pharmacy phone number (if available)? [phone]  Additional Information for Provider? Patient has been out since December. ---------------------------------------------------------------------------  --------------  Michelle Licea INFO  What is the best way for the office to contact you? OK to leave message on   voicemail  Preferred Call Back Phone Number? [phone]  ---------------------------------------------------------------------------  --------------  SCRIPT ANSWERS  Relationship to Patient?  Self

## 2022-06-21 ENCOUNTER — TELEPHONE (OUTPATIENT)
Dept: INTERNAL MEDICINE CLINIC | Age: 87
End: 2022-06-21

## 2022-06-21 NOTE — TELEPHONE ENCOUNTER
The patient's daughter called back stating that her father has a fever of 101 hasn't been eating or sleeping has been coughing. I told her that he should be tested for Covid. She states he had a booster on the 7th of this month and he doesn't need to be tested. I told her that was 2 weeks ago that he should be test. I asked Juventino too be sure, she agreed. The patient's daughter Richard Dunlap then stated that she believes that the patient had a stroke. I informed her if she believes he had a stroke he needs to go immediately to the emergency room. She said if this is her father's doctors office he's supposed to get treatment here. I let here know that if he did have or is having a stroke the best place for him is the hospital. There isn't anything we can do during a stroke. That's when she said he's getting better from his stroke that he's up and walking around now. Then she hung up on me.

## 2022-06-21 NOTE — TELEPHONE ENCOUNTER
----- Message from Nash Wang sent at 6/20/2022  4:58 PM EDT -----  Subject: Appointment Request    Reason for Call: Routine Existing Condition Follow Up    QUESTIONS  Type of Appointment? Established Patient  Reason for appointment request? Available appointments did not meet   patient need  Additional Information for Provider? pt has been tired and not eating   daughter Emigdio bhatt on [employer] is concerned and would like to have him   seen as soon as possible screened green   ---------------------------------------------------------------------------  --------------  CALL BACK INFO  What is the best way for the office to contact you? OK to leave message on   voicemail  Preferred Call Back Phone Number? [phone]  ---------------------------------------------------------------------------  --------------  SCRIPT ANSWERS  Relationship to Patient? Other  Representative Name? Emigdio bhatt   Additional information verified (besides Name and Date of Birth)? Phone   Number  Is this follow up request related to routine Diabetes Management? No  Have you been diagnosed with COVID-19 in the past 10 days? No  (Service Expert  click yes below to proceed with [employer] As Usual   Scheduling)?  Yes

## 2022-06-21 NOTE — TELEPHONE ENCOUNTER
Agree if concern for stroke should go IMMEDIATELY to the ED for advanced treatment that cannot be given here. Time is brain.   I tried calling patient myself, no answer, unable to leave VM, voicemail is full

## 2022-07-11 ENCOUNTER — OFFICE VISIT (OUTPATIENT)
Dept: INTERNAL MEDICINE CLINIC | Age: 87
End: 2022-07-11
Payer: MEDICARE

## 2022-07-11 VITALS
DIASTOLIC BLOOD PRESSURE: 70 MMHG | HEIGHT: 66 IN | SYSTOLIC BLOOD PRESSURE: 126 MMHG | TEMPERATURE: 98.4 F | WEIGHT: 279 LBS | OXYGEN SATURATION: 95 % | BODY MASS INDEX: 44.84 KG/M2 | HEART RATE: 62 BPM

## 2022-07-11 DIAGNOSIS — M1A.9XX0 CHRONIC GOUT WITHOUT TOPHUS, UNSPECIFIED CAUSE, UNSPECIFIED SITE: Chronic | ICD-10-CM

## 2022-07-11 DIAGNOSIS — N18.31 STAGE 3A CHRONIC KIDNEY DISEASE (HCC): ICD-10-CM

## 2022-07-11 DIAGNOSIS — H91.93 DECREASED HEARING OF BOTH EARS: ICD-10-CM

## 2022-07-11 DIAGNOSIS — E11.69 DIABETES MELLITUS TYPE 2 IN OBESE (HCC): ICD-10-CM

## 2022-07-11 DIAGNOSIS — D64.9 ANEMIA, UNSPECIFIED TYPE: Chronic | ICD-10-CM

## 2022-07-11 DIAGNOSIS — N40.1 BENIGN NON-NODULAR PROSTATIC HYPERPLASIA WITH LOWER URINARY TRACT SYMPTOMS: Primary | ICD-10-CM

## 2022-07-11 DIAGNOSIS — E66.9 DIABETES MELLITUS TYPE 2 IN OBESE (HCC): ICD-10-CM

## 2022-07-11 DIAGNOSIS — E04.2 GOITER, NONTOXIC, MULTINODULAR: Primary | ICD-10-CM

## 2022-07-11 DIAGNOSIS — J43.9 PULMONARY EMPHYSEMA, UNSPECIFIED EMPHYSEMA TYPE (HCC): ICD-10-CM

## 2022-07-11 DIAGNOSIS — I10 ESSENTIAL HYPERTENSION: Chronic | ICD-10-CM

## 2022-07-11 DIAGNOSIS — E04.2 GOITER, NONTOXIC, MULTINODULAR: ICD-10-CM

## 2022-07-11 LAB
ANION GAP SERPL CALCULATED.3IONS-SCNC: 10 MMOL/L (ref 3–16)
BASOPHILS ABSOLUTE: 0 K/UL (ref 0–0.2)
BASOPHILS RELATIVE PERCENT: 0.3 %
BUN BLDV-MCNC: 11 MG/DL (ref 7–20)
CALCIUM SERPL-MCNC: 10 MG/DL (ref 8.3–10.6)
CHLORIDE BLD-SCNC: 98 MMOL/L (ref 99–110)
CHOLESTEROL, TOTAL: 155 MG/DL (ref 0–199)
CO2: 33 MMOL/L (ref 21–32)
CREAT SERPL-MCNC: 1.6 MG/DL (ref 0.8–1.3)
EOSINOPHILS ABSOLUTE: 0.2 K/UL (ref 0–0.6)
EOSINOPHILS RELATIVE PERCENT: 3.1 %
FERRITIN: 44 NG/ML (ref 30–400)
GFR AFRICAN AMERICAN: 50
GFR NON-AFRICAN AMERICAN: 41
GLUCOSE BLD-MCNC: 110 MG/DL (ref 70–99)
HCT VFR BLD CALC: 38.4 % (ref 40.5–52.5)
HDLC SERPL-MCNC: 34 MG/DL (ref 40–60)
HEMOGLOBIN: 12.1 G/DL (ref 13.5–17.5)
LDL CHOLESTEROL CALCULATED: 96 MG/DL
LYMPHOCYTES ABSOLUTE: 1.4 K/UL (ref 1–5.1)
LYMPHOCYTES RELATIVE PERCENT: 27.4 %
MCH RBC QN AUTO: 28.9 PG (ref 26–34)
MCHC RBC AUTO-ENTMCNC: 31.5 G/DL (ref 31–36)
MCV RBC AUTO: 91.8 FL (ref 80–100)
MONOCYTES ABSOLUTE: 0.5 K/UL (ref 0–1.3)
MONOCYTES RELATIVE PERCENT: 9.3 %
NEUTROPHILS ABSOLUTE: 3 K/UL (ref 1.7–7.7)
NEUTROPHILS RELATIVE PERCENT: 59.9 %
PDW BLD-RTO: 16.7 % (ref 12.4–15.4)
PLATELET # BLD: 185 K/UL (ref 135–450)
PMV BLD AUTO: 8.6 FL (ref 5–10.5)
POTASSIUM SERPL-SCNC: 3.8 MMOL/L (ref 3.5–5.1)
RBC # BLD: 4.19 M/UL (ref 4.2–5.9)
SODIUM BLD-SCNC: 141 MMOL/L (ref 136–145)
TRIGL SERPL-MCNC: 127 MG/DL (ref 0–150)
VITAMIN D 25-HYDROXY: 23.6 NG/ML
VLDLC SERPL CALC-MCNC: 25 MG/DL
WBC # BLD: 5.1 K/UL (ref 4–11)

## 2022-07-11 PROCEDURE — 1123F ACP DISCUSS/DSCN MKR DOCD: CPT | Performed by: INTERNAL MEDICINE

## 2022-07-11 PROCEDURE — 99214 OFFICE O/P EST MOD 30 MIN: CPT | Performed by: INTERNAL MEDICINE

## 2022-07-11 ASSESSMENT — PATIENT HEALTH QUESTIONNAIRE - PHQ9
2. FEELING DOWN, DEPRESSED OR HOPELESS: 0
SUM OF ALL RESPONSES TO PHQ QUESTIONS 1-9: 0
1. LITTLE INTEREST OR PLEASURE IN DOING THINGS: 0
SUM OF ALL RESPONSES TO PHQ QUESTIONS 1-9: 0
SUM OF ALL RESPONSES TO PHQ QUESTIONS 1-9: 0
SUM OF ALL RESPONSES TO PHQ9 QUESTIONS 1 & 2: 0
SUM OF ALL RESPONSES TO PHQ QUESTIONS 1-9: 0

## 2022-07-11 ASSESSMENT — ENCOUNTER SYMPTOMS: SHORTNESS OF BREATH: 0

## 2022-07-11 NOTE — ASSESSMENT & PLAN NOTE
Improving last cbc  -Repeat CBC, iron panel  -not taking iron anymore  -Was referred to GI by prior PCP due to hemoglobin decline, Per patients daughter had seen GI but I am unable to find documentation. Per patient had 3 colonoscopies so far and was told he does not need to repeat. normal...

## 2022-07-11 NOTE — PROGRESS NOTES
[facility] Internal Medicine  Follow up visit   2022    Lisy Noel (:  1934) is a 80 y.o. male, here for evaluation of the following medical concerns:    Chief Complaint   Patient presents with    Medication Refill        ASSESSMENT/ PLAN:    Anemia  Improving last cbc  -Repeat CBC, iron panel  -not taking iron anymore  -Was referred to GI by prior PCP due to hemoglobin decline, Per patients daughter had seen GI but I am unable to find documentation. Per patient had 3 colonoscopies so far and was told he does not need to repeat. Chronic gout without tophus  Stable  -Continue allopurinol 200 mg daily  -uric acid 5.8     Chronic kidney disease, stage III (moderate)  -baseline GFR 40-50s. -He is status post nephrectomy, unclear why  -Follows with urology? Will refer to nephro   -microalbuminuria 258     Decreased hearing  -Will refer to ENT/ audiology     Diabetes mellitus type 2 in [facility])  Well-controlled, last A1c 6.3% , overall doing well from a clinical standpoint. Known CKD stage III. -Currently not on treatment and is managed with lifestyle. Was on Lantus, semaglutide and Januvia before, per patient and family stopped by prior PCP.    - Repeat HbA1c. AIC goal< 7    - BP within goal.  Not on ACE/ARB  - LDL within goal, continue simvastatin 20 mg   - Continue ASA   - low carb diet and regular exercise  - regular foot care  - repeat BMP, microalbuminuria 258   - uptodate w/ PSV23 vaccine. - Follow up in 3 months    Essential hypertension  Well controlled  -continue metoprolol 25 mg bid  -bmp    Goiter, nontoxic, multinodular  -Last thyroid ultrasound -multinodular goiter, largest node 1 cm on the right without suspicious features, likely benign  -had been on synthroid 25 mcg for mildly elevated TSH 4-6 in the past. Last TSH 3.34. given on such low dose which will be subtherapeutic with no hx of TSH >7, will try to stop tx.   -repeat TSH now and in 6 weeks. Orders Placed This Encounter   Procedures    CBC with Auto Differential    Hemoglobin A1C    Basic Metabolic Panel    Vitamin D 25 Hydroxy    Ferritin    Lipid Panel   [address], [place]. D., Audiology, Wilbur Suazo DO, Otolaryngology, [address] [place] [address] Ambulatory referral to Nephrology   Mamadou Gamboa MD, Urology, Agatha Bowman     No orders of the defined types were placed in this encounter. Medications Discontinued During This Encounter   Medication Reason    aspirin EC 81 MG EC tablet ERROR    famotidine (PEPCID) 20 MG tablet ERROR    ferrous sulfate (FE TABS) 325 (65 Fe) MG EC tablet ERROR    Multiple Vitamin (MULTIVITAMIN PO) ERROR    potassium chloride (KLOR-CON M) 20 MEQ extended release tablet ERROR    albuterol sulfate HFA (PROAIR HFA) 108 (90 Base) MCG/ACT inhaler ERROR    levothyroxine (SYNTHROID) 25 MCG tablet Therapy completed        No follow-ups on file. HPI  Here with his daughter. Overall doing well and has no concerns. HISTORIES   Current Outpatient Medications on File Prior to Visit   Medication Sig Dispense Refill    simvastatin (ZOCOR) 20 MG tablet Take 1 tablet by mouth nightly 90 tablet 1    torsemide (DEMADEX) 100 MG tablet Take 1 tablet (100 mg) in the morning and 1/2 tablet (50 mg) in the late afternoon 135 tablet 3    tamsulosin (FLOMAX) 0.4 MG capsule Take 1 capsule by mouth daily 90 capsule 3    carvedilol (COREG) 25 MG tablet Take 1 tablet by mouth 2 times daily 180 tablet 3    ACCU-CHEK MEAGAN PLUS strip USE TO TEST THREE TIMES A DAY BEFORE MEALS 100 strip 1    Accu-Chek Softclix Lancets MISC Check 3 times day before meals 100 each 3    allopurinol (ZYLOPRIM) 100 MG tablet TAKE TWO TABLETS BY MOUTH DAILY 180 tablet 3     No current facility-administered medications on file prior to visit.        No Known Allergies  Past Medical History:   Diagnosis Date    Anemia 4/9/2012    Antineutrophil cytoplasmic antibody (ANCA) positive 8/21/2017    Atelectasis of left lung 2/18/2016    Bell's palsy 7/24/2015    Left sided - July 2015    Benign non-nodular prostatic hyperplasia with lower urinary tract symptoms 11/3/2015    BPH (benign prostatic hypertrophy) 7/5/2011    Chronic gout without tophus 11/3/2015    Chronic kidney disease, stage III (moderate) (HCC)     Chronic pain of both knees 11/8/2016    Coronary artery calcification seen on CT scan 2/18/2016    Diabetes mellitus, type 2 ([address]     Diastolic dysfunction 3/2/4185    Dyspnea on exertion 2/7/2014    Edema 1/8/2013    Elevated PSA 5/7/2015    Essential hypertension 11/3/2015    Gastroesophageal reflux disease without esophagitis 11/3/2015    GERD (gastroesophageal reflux disease) 7/5/2011    Gout 9/22/2011    Hepatic cyst 2/18/2016    Hypercalcemia 8/24/2017    Hyperlipidemia     Hypertension     Insomnia 6/29/2010    Localized edema 9/20/2016    Morbid obesity due to excess calories ([address] 11/8/2016    Multiple thyroid nodules 2/18/2016    Nocturia 5/7/2015    Obesity     Obstructive sleep apnea     Primary osteoarthritis of left knee 11/8/2016    Primary osteoarthritis of right knee 11/8/2016    Proteinuria 9/27/2010    Pulmonary emphysema ([address] 2/8/2017    Pulmonary nodules 2/4/2016    Shortness of breath 2/4/2016    Type 2 diabetes mellitus with diabetic chronic kidney disease ([address] 11/3/2015    Type 2 diabetes mellitus with stage 3 chronic kidney disease ([address] 2/4/2016    Type 2 diabetes mellitus with stage 3 chronic kidney disease, without long-term current use of insulin ([address] 8/5/2016    Vitamin D deficiency 8/21/2017     Patient Active Problem List   Diagnosis    Obstructive sleep apnea    Chronic kidney disease, stage III (moderate) (HCC)    Hyperlipidemia    Insomnia    Proteinuria    Anemia    Fatigue    Dyspnea on exertion    Bilateral leg weakness    Osteoarthritis    Diastolic dysfunction  Elevated PSA    Bell's palsy    Hypoxia    Decreased hearing    Chronic gout without tophus    Essential hypertension    Benign non-nodular prostatic hyperplasia with lower urinary tract symptoms    Gastroesophageal reflux disease without esophagitis    Pulmonary nodules    Hepatic cyst    Coronary artery calcification seen on CT scan    Difficulty walking    Diabetes mellitus type 2 in obese (HCC)    Goiter, nontoxic, multinodular    Primary osteoarthritis of right knee    Primary osteoarthritis of left knee    Morbid obesity due to excess calories (HCC)    Pulmonary emphysema (HCC)    Venous stasis dermatitis of both lower extremities    Antineutrophil cytoplasmic antibody (ANCA) positive    Vitamin D deficiency    Hypercalcemia     Past Surgical History:   Procedure Laterality Date    APPENDECTOMY      COLONOSCOPY      Dr. Edwards Serum FLX DX W/COLLJ Nahedfatuma 1978 PFRMD N/A 11/6/2018    COLONOSCOPY WITH MAC performed by Everlyn Severe, MD at [facility]      left sided - August 2000    TRANSURETHRAL RESECTION OF PROSTATE      August 5192    UMBILICAL HERNIA REPAIR      August 2000    UPPER GASTROINTESTINAL ENDOSCOPY N/A 11/6/2018    EGD BIOPSY Forcep biopsy of gastric R/O H. 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    Occupation: retired - age 72     Comment: as of May 10, 2013   Tobacco Use    Smoking status: Former Smoker     Types: Cigarettes    Smokeless tobacco: Never Used    Tobacco comment: quit smoking in 1968   Substance and Sexual Activity    Alcohol use: No    Drug use: No    Sexual activity: Never     Comment:  - 2004   Other Topics Concern    Not on file   Social History Narrative    Past Surgical History     Appendectomy    Hernia Surgery: umbilical hernia repair - 2000    TURP: 2000    Cervical Cyst Removed    left nephrectomy - 2000                                                     Last updated by Christy Allen MD on 10/16/2008              Social History       Marital Status:  ()    Children: 1     Employment Status: retired -     Occupation:     Caffeine per Day: one cup of coffee     Alcohol Use: none    Drug Use: none    Tobacco Usage: prior smoker    Cigarettes - Year Quit:                                                 Last updated by Colletta Brazen MD on 2014                      Family History     mother -  - age 55 - 56 - heart attack    father -  - age 67 -  in his sleep        brother -  - age 61 - 5 - head injury with need for a plate, seizures    brother - Vincenzo Ellis - abnormal weight loss        sister - Charla - throat cancer (smoker) (age 62)        son - Subha Ocasio - obesity                                 Last updated by Christy Allen MD on 01/15/2009      Social Determinants of Health     Financial Resource Strain: Low Risk     Difficulty of Paying Living Expenses: Not very hard   Food Insecurity: No Food Insecurity    Worried About Running Out of Food in the Last Year: Never true    [place] of Food in the Last Year: Never true   Transportation Needs:     Lack of Transportation (Medical): Not on file    Lack of Transportation (Non-Medical):  Not on file   Physical Activity:     Days of Exercise per Week: Not on file    Minutes of Exercise per Session: Not on file   Stress:     Feeling of Stress : Not on file   Social Connections:     Frequency of Communication with Friends and Family: Not on file    Frequency of Social Gatherings with Friends and Family: Not on file    Attends Protestant Services: Not on file   [place] Active Member of Clubs or Organizations: Not on file    Attends Club or Organization Meetings: Not on file    Marital Status: Not on file   Intimate Partner Violence:     Fear of Current or Ex-Partner: Not on file    Emotionally Abused: Not on file    Physically Abused: Not on file    Sexually Abused: Not on file   Housing Stability:     Unable to Pay for Housing in the Last Year: Not on file    Number of [place] in the Last Year: Not on file    Unstable Housing in the Last Year: Not on file      Family History   Problem Relation Age of Onset    Cancer Sister         throat    Substance Abuse Sister         smoker    Heart Disease Mother     Heart Attack Mother     Stroke Brother     Seizures Brother        ROS  Review of Systems   Respiratory: Negative for shortness of breath. Cardiovascular: Positive for leg swelling. Negative for chest pain. Skin: Negative for wound. PE  Vitals:    07/11/22 1033   BP: 126/70   Site: Left Upper Arm   Position: Sitting   Pulse: 62   Temp: 98.4 °F (36.9 °C)   SpO2: 95%   Weight: 279 lb (126.6 kg)   Height: 5' 6\" (1.676 m)     Estimated body mass index is 45.03 kg/m² as calculated from the following:    Height as of this encounter: 5' 6\" (1.676 m). Weight as of this encounter: 279 lb (126.6 kg). Physical Exam  Vitals reviewed. Constitutional:       General: He is not in acute distress. Appearance: Normal appearance. He is well-developed. He is not ill-appearing, toxic-appearing or diaphoretic. HENT:      Head: Normocephalic and atraumatic. Eyes:      General: No scleral icterus. Conjunctiva/sclera: Conjunctivae normal.      Pupils: Pupils are equal, round, and reactive to light. Cardiovascular:      Rate and Rhythm: Normal rate and regular rhythm. Heart sounds: Normal heart sounds. Pulmonary:      Effort: Pulmonary effort is normal. No respiratory distress. Breath sounds: Normal breath sounds.    Musculoskeletal:      Cervical back: Normal range of motion and neck supple. Right lower leg: No edema. Left lower leg: Edema (chronic and unchanged) present. Skin:     General: Skin is warm and dry. Coloration: Skin is not jaundiced. Neurological:      Mental Status: He is alert and oriented to person, place, and time. Psychiatric:         Behavior: Behavior normal.           Brianna Fischer MD    This dictation was generated by voice recognition computer software. Although all attempts are made to edit the dictation for accuracy, there may be errors in the transcription that are not intended.

## 2022-07-11 NOTE — ASSESSMENT & PLAN NOTE
-baseline GFR 40-50s. -He is status post nephrectomy, unclear why  -Follows with urology?  Will refer to nephro   -microalbuminuria 258 9/21

## 2022-07-11 NOTE — ASSESSMENT & PLAN NOTE
-Last thyroid ultrasound 2016-multinodular goiter, largest node 1 cm on the right without suspicious features, likely benign  -had been on synthroid 25 mcg for mildly elevated TSH 4-6 in the past. Last TSH 3.34. given on such low dose which will be subtherapeutic with no hx of TSH >7, will try to stop tx.   -repeat TSH now and in 6 weeks.

## 2022-07-11 NOTE — ASSESSMENT & PLAN NOTE
Well-controlled, last A1c 6.3% 9/21, overall doing well from a clinical standpoint. Known CKD stage III. -Currently not on treatment and is managed with lifestyle. Was on Lantus, semaglutide and Januvia before, per patient and family stopped by prior PCP.    - Repeat HbA1c. AIC goal< 7    - BP within goal.  Not on ACE/ARB  - LDL within goal, continue simvastatin 20 mg   - Continue ASA   - low carb diet and regular exercise  - regular foot care  - repeat BMP, microalbuminuria 258 9/21  - uptodate w/ PSV23 vaccine.   - Follow up in 3 months

## 2022-07-12 LAB
ESTIMATED AVERAGE GLUCOSE: 159.9 MG/DL
HBA1C MFR BLD: 7.2 %

## 2022-08-19 DIAGNOSIS — N18.31 TYPE 2 DIABETES MELLITUS WITH STAGE 3A CHRONIC KIDNEY DISEASE, WITH LONG-TERM CURRENT USE OF INSULIN (HCC): ICD-10-CM

## 2022-08-19 DIAGNOSIS — Z79.4 TYPE 2 DIABETES MELLITUS WITH STAGE 3A CHRONIC KIDNEY DISEASE, WITH LONG-TERM CURRENT USE OF INSULIN (HCC): ICD-10-CM

## 2022-08-19 DIAGNOSIS — E11.22 TYPE 2 DIABETES MELLITUS WITH STAGE 3A CHRONIC KIDNEY DISEASE, WITH LONG-TERM CURRENT USE OF INSULIN (HCC): ICD-10-CM

## 2022-08-20 RX ORDER — BLOOD SUGAR DIAGNOSTIC
STRIP MISCELLANEOUS
Qty: 100 STRIP | Refills: 1 | Status: SHIPPED | OUTPATIENT
Start: 2022-08-20

## 2022-09-06 DIAGNOSIS — N18.30 STAGE 3 CHRONIC KIDNEY DISEASE, UNSPECIFIED WHETHER STAGE 3A OR 3B CKD (HCC): ICD-10-CM

## 2022-09-06 DIAGNOSIS — E04.2 GOITER, NONTOXIC, MULTINODULAR: ICD-10-CM

## 2022-09-06 DIAGNOSIS — I50.30 DIASTOLIC CONGESTIVE HEART FAILURE, UNSPECIFIED HF CHRONICITY (HCC): ICD-10-CM

## 2022-09-06 LAB
BASOPHILS ABSOLUTE: 0 K/UL (ref 0–0.2)
BASOPHILS RELATIVE PERCENT: 0.6 %
EOSINOPHILS ABSOLUTE: 0.2 K/UL (ref 0–0.6)
EOSINOPHILS RELATIVE PERCENT: 6 %
HCT VFR BLD CALC: 35.8 % (ref 40.5–52.5)
HEMOGLOBIN: 11.3 G/DL (ref 13.5–17.5)
LYMPHOCYTES ABSOLUTE: 1.2 K/UL (ref 1–5.1)
LYMPHOCYTES RELATIVE PERCENT: 30.4 %
MCH RBC QN AUTO: 28.3 PG (ref 26–34)
MCHC RBC AUTO-ENTMCNC: 31.5 G/DL (ref 31–36)
MCV RBC AUTO: 89.7 FL (ref 80–100)
MONOCYTES ABSOLUTE: 0.3 K/UL (ref 0–1.3)
MONOCYTES RELATIVE PERCENT: 8.6 %
NEUTROPHILS ABSOLUTE: 2.1 K/UL (ref 1.7–7.7)
NEUTROPHILS RELATIVE PERCENT: 54.4 %
PDW BLD-RTO: 16.5 % (ref 12.4–15.4)
PLATELET # BLD: 212 K/UL (ref 135–450)
PMV BLD AUTO: 8.5 FL (ref 5–10.5)
RBC # BLD: 4 M/UL (ref 4.2–5.9)
WBC # BLD: 3.8 K/UL (ref 4–11)

## 2022-09-07 LAB
ALBUMIN SERPL-MCNC: 3.9 G/DL (ref 3.4–5)
ANION GAP SERPL CALCULATED.3IONS-SCNC: 9 MMOL/L (ref 3–16)
BUN BLDV-MCNC: 27 MG/DL (ref 7–20)
CALCIUM SERPL-MCNC: 9.9 MG/DL (ref 8.3–10.6)
CHLORIDE BLD-SCNC: 100 MMOL/L (ref 99–110)
CO2: 34 MMOL/L (ref 21–32)
CREAT SERPL-MCNC: 1.7 MG/DL (ref 0.8–1.3)
CREATININE URINE: 111.7 MG/DL (ref 39–259)
GFR AFRICAN AMERICAN: 46
GFR NON-AFRICAN AMERICAN: 38
GLUCOSE BLD-MCNC: 152 MG/DL (ref 70–99)
MICROALBUMIN UR-MCNC: 12.6 MG/DL
MICROALBUMIN/CREAT UR-RTO: 112.8 MG/G (ref 0–30)
PHOSPHORUS: 3.1 MG/DL (ref 2.5–4.9)
POTASSIUM SERPL-SCNC: 3.8 MMOL/L (ref 3.5–5.1)
PRO-BNP: 362 PG/ML (ref 0–449)
SODIUM BLD-SCNC: 143 MMOL/L (ref 136–145)
T4 FREE: 0.9 NG/DL (ref 0.9–1.8)
TSH REFLEX FT4: 5.36 UIU/ML (ref 0.27–4.2)

## 2022-09-09 LAB
KAPPA, FREE LIGHT CHAINS, SERUM: 88.74 MG/L (ref 3.3–19.4)
KAPPA/LAMBDA RATIO: 2.16 (ref 0.26–1.65)
KAPPA/LAMBDA TEST COMMENT: ABNORMAL
LAMBDA, FREE LIGHT CHAINS, SERUM: 41.12 MG/L (ref 5.71–26.3)

## 2022-09-14 ENCOUNTER — TELEPHONE (OUTPATIENT)
Dept: INTERNAL MEDICINE CLINIC | Age: 87
End: 2022-09-14

## 2022-09-14 DIAGNOSIS — E04.2 GOITER, NONTOXIC, MULTINODULAR: Primary | ICD-10-CM

## 2022-09-14 NOTE — TELEPHONE ENCOUNTER
----- Message from Tera Holter sent at 9/14/2022 10:08 AM EDT -----  Subject: Message to Provider    QUESTIONS  Information for Provider? Returning call for test results. ---------------------------------------------------------------------------  --------------  Martina CARRENO  [phone];  Do not leave any message, patient will call back for answer  ---------------------------------------------------------------------------  --------------  SCRIPT ANSWERS  undefined

## 2022-09-14 NOTE — TELEPHONE ENCOUNTER
We need to start with repeating thyroid function testing (can come anytime), if persistently elevated TSH we will restart treatment and repeat labs 6 weeks later. Again please make sure he is not taking any supplements or vitamin containing biotin (if he is stop for 3 days before retesting).   We will decide if we need to restart the medication after we see the repeat testing (please order TSH so he can get it done)

## 2022-09-15 NOTE — TELEPHONE ENCOUNTER
I spoke with the patient's daughter she is informed of the repeat testing and will have lab work completed

## 2022-09-26 DIAGNOSIS — E04.2 GOITER, NONTOXIC, MULTINODULAR: ICD-10-CM

## 2022-09-26 LAB
T4 FREE: 0.9 NG/DL (ref 0.9–1.8)
TSH REFLEX: 8.31 UIU/ML (ref 0.27–4.2)

## 2022-09-27 DIAGNOSIS — E11.69 DIABETES MELLITUS TYPE 2 IN OBESE (HCC): Primary | ICD-10-CM

## 2022-09-27 DIAGNOSIS — E04.2 GOITER, NONTOXIC, MULTINODULAR: ICD-10-CM

## 2022-09-27 DIAGNOSIS — E66.9 DIABETES MELLITUS TYPE 2 IN OBESE (HCC): Primary | ICD-10-CM

## 2022-09-27 RX ORDER — LEVOTHYROXINE SODIUM 0.03 MG/1
25 TABLET ORAL DAILY
Qty: 90 TABLET | Refills: 1 | Status: SHIPPED | OUTPATIENT
Start: 2022-09-27

## 2022-10-12 ENCOUNTER — TELEPHONE (OUTPATIENT)
Dept: INTERNAL MEDICINE CLINIC | Age: 87
End: 2022-10-12

## 2022-10-13 NOTE — TELEPHONE ENCOUNTER
Lab Flowsheet     Order Details     View Encounter     Lab and Collection Details     Routing     Result History     View Encounter Conversation           Result Care Coordination      Result Notes   Micha Kahn   10/13/2022 10:50 AM EDT Back to Top      Vm is full   3rd attempt   Letter sent     Micha Kahn   10/12/2022  1:38 PM EDT       Vm is full   2nd attempt     Angelina Bauer MA   10/3/2022  3:59 PM EDT       Pt.  Voicemail full could not leave a message     Patt Dangelo MD   9/27/2022  9:52 PM EDT       TSH now elevated, indicating low thyroid state and need for thyroid med (which we tried to wean

## 2022-10-13 NOTE — TELEPHONE ENCOUNTER
See my last lab msg 9/26, please call pt to ensure he restarted synthroid and let me know he received msg.

## 2022-10-13 NOTE — TELEPHONE ENCOUNTER
I called (ALTERNATE CONTACT)  TODAY was given a new number ([phone]) Left message on machine  To call  when receive message regarding synthroid

## 2022-10-20 ENCOUNTER — TELEPHONE (OUTPATIENT)
Dept: INTERNAL MEDICINE CLINIC | Age: 87
End: 2022-10-20

## 2022-11-07 RX ORDER — SIMVASTATIN 20 MG
TABLET ORAL
Qty: 90 TABLET | Refills: 1 | Status: SHIPPED | OUTPATIENT
Start: 2022-11-07

## 2022-12-01 ENCOUNTER — APPOINTMENT (OUTPATIENT)
Dept: GENERAL RADIOLOGY | Age: 87
DRG: 871 | End: 2022-12-01
Payer: MEDICARE

## 2022-12-01 ENCOUNTER — HOSPITAL ENCOUNTER (INPATIENT)
Age: 87
LOS: 5 days | Discharge: HOME HEALTH | DRG: 871 | End: 2022-12-07
Attending: EMERGENCY MEDICINE | Admitting: HOSPITALIST
Payer: MEDICARE

## 2022-12-01 DIAGNOSIS — I50.31 ACUTE DIASTOLIC CHF (CONGESTIVE HEART FAILURE) (HCC): ICD-10-CM

## 2022-12-01 DIAGNOSIS — J18.9 PNEUMONIA DUE TO INFECTIOUS ORGANISM, UNSPECIFIED LATERALITY, UNSPECIFIED PART OF LUNG: Primary | ICD-10-CM

## 2022-12-01 DIAGNOSIS — R09.02 HYPOXIA: ICD-10-CM

## 2022-12-01 LAB
ALBUMIN SERPL-MCNC: 4.1 G/DL (ref 3.4–5)
ALP BLD-CCNC: 64 U/L (ref 40–129)
ALT SERPL-CCNC: <5 U/L (ref 10–40)
ANION GAP SERPL CALCULATED.3IONS-SCNC: 10 MMOL/L (ref 3–16)
AST SERPL-CCNC: 12 U/L (ref 15–37)
BASE EXCESS VENOUS: 3.6 MMOL/L (ref -2–3)
BASOPHILS ABSOLUTE: 0 K/UL (ref 0–0.2)
BASOPHILS RELATIVE PERCENT: 0.2 %
BILIRUB SERPL-MCNC: 0.8 MG/DL (ref 0–1)
BILIRUBIN DIRECT: <0.2 MG/DL (ref 0–0.3)
BILIRUBIN, INDIRECT: ABNORMAL MG/DL (ref 0–1)
BUN BLDV-MCNC: 31 MG/DL (ref 7–20)
CALCIUM SERPL-MCNC: 10.4 MG/DL (ref 8.3–10.6)
CARBOXYHEMOGLOBIN: 1.6 % (ref 0–1.5)
CHLORIDE BLD-SCNC: 102 MMOL/L (ref 99–110)
CO2: 29 MMOL/L (ref 21–32)
CREAT SERPL-MCNC: 2.1 MG/DL (ref 0.8–1.3)
EOSINOPHILS ABSOLUTE: 0 K/UL (ref 0–0.6)
EOSINOPHILS RELATIVE PERCENT: 0 %
GFR SERPL CREATININE-BSD FRML MDRD: 30 ML/MIN/{1.73_M2}
GLUCOSE BLD-MCNC: 141 MG/DL (ref 70–99)
HCO3 VENOUS: 31.6 MMOL/L (ref 24–28)
HCT VFR BLD CALC: 34.8 % (ref 40.5–52.5)
HEMOGLOBIN, VEN, REDUCED: 75.6 %
HEMOGLOBIN: 11.1 G/DL (ref 13.5–17.5)
LACTIC ACID: 1.5 MMOL/L (ref 0.4–2)
LIPASE: 17 U/L (ref 13–60)
LYMPHOCYTES ABSOLUTE: 0.7 K/UL (ref 1–5.1)
LYMPHOCYTES RELATIVE PERCENT: 5.3 %
MCH RBC QN AUTO: 27.4 PG (ref 26–34)
MCHC RBC AUTO-ENTMCNC: 31.9 G/DL (ref 31–36)
MCV RBC AUTO: 86 FL (ref 80–100)
METHEMOGLOBIN VENOUS: 0.3 % (ref 0–1.5)
MONOCYTES ABSOLUTE: 0.6 K/UL (ref 0–1.3)
MONOCYTES RELATIVE PERCENT: 4 %
NEUTROPHILS ABSOLUTE: 12.7 K/UL (ref 1.7–7.7)
NEUTROPHILS RELATIVE PERCENT: 90.5 %
O2 SAT, VEN: 23 %
PCO2, VEN: 64.3 MMHG (ref 41–51)
PDW BLD-RTO: 15.7 % (ref 12.4–15.4)
PH VENOUS: 7.3 (ref 7.35–7.45)
PLATELET # BLD: 192 K/UL (ref 135–450)
PMV BLD AUTO: 8.9 FL (ref 5–10.5)
PO2, VEN: <30 MMHG (ref 25–40)
POTASSIUM REFLEX MAGNESIUM: 4.8 MMOL/L (ref 3.5–5.1)
RBC # BLD: 4.04 M/UL (ref 4.2–5.9)
SODIUM BLD-SCNC: 141 MMOL/L (ref 136–145)
TCO2 CALC VENOUS: 34 MMOL/L
TOTAL PROTEIN: 8.1 G/DL (ref 6.4–8.2)
TROPONIN: 0.04 NG/ML
WBC # BLD: 14.1 K/UL (ref 4–11)

## 2022-12-01 PROCEDURE — 36415 COLL VENOUS BLD VENIPUNCTURE: CPT

## 2022-12-01 PROCEDURE — 99285 EMERGENCY DEPT VISIT HI MDM: CPT

## 2022-12-01 PROCEDURE — 83690 ASSAY OF LIPASE: CPT

## 2022-12-01 PROCEDURE — 85025 COMPLETE CBC W/AUTO DIFF WBC: CPT

## 2022-12-01 PROCEDURE — 83605 ASSAY OF LACTIC ACID: CPT

## 2022-12-01 PROCEDURE — 71045 X-RAY EXAM CHEST 1 VIEW: CPT

## 2022-12-01 PROCEDURE — 80076 HEPATIC FUNCTION PANEL: CPT

## 2022-12-01 PROCEDURE — 93005 ELECTROCARDIOGRAM TRACING: CPT | Performed by: EMERGENCY MEDICINE

## 2022-12-01 PROCEDURE — 84145 PROCALCITONIN (PCT): CPT

## 2022-12-01 PROCEDURE — 84484 ASSAY OF TROPONIN QUANT: CPT

## 2022-12-01 PROCEDURE — 84439 ASSAY OF FREE THYROXINE: CPT

## 2022-12-01 PROCEDURE — 80048 BASIC METABOLIC PNL TOTAL CA: CPT

## 2022-12-01 PROCEDURE — 82803 BLOOD GASES ANY COMBINATION: CPT

## 2022-12-01 PROCEDURE — 84443 ASSAY THYROID STIM HORMONE: CPT

## 2022-12-01 ASSESSMENT — PAIN DESCRIPTION - PAIN TYPE: TYPE: ACUTE PAIN

## 2022-12-01 ASSESSMENT — PAIN DESCRIPTION - FREQUENCY: FREQUENCY: CONTINUOUS

## 2022-12-01 ASSESSMENT — PAIN - FUNCTIONAL ASSESSMENT: PAIN_FUNCTIONAL_ASSESSMENT: WONG-BAKER FACES

## 2022-12-01 ASSESSMENT — PAIN SCALES - WONG BAKER: WONGBAKER_NUMERICALRESPONSE: 4

## 2022-12-02 ENCOUNTER — APPOINTMENT (OUTPATIENT)
Dept: CT IMAGING | Age: 87
DRG: 871 | End: 2022-12-02
Payer: MEDICARE

## 2022-12-02 PROBLEM — J96.00 ACUTE RESPIRATORY FAILURE, UNSP W HYPOXIA OR HYPERCAPNIA (HCC): Status: ACTIVE | Noted: 2022-12-02

## 2022-12-02 PROBLEM — J18.9 PNEUMONIA DUE TO INFECTIOUS ORGANISM: Status: ACTIVE | Noted: 2022-12-02

## 2022-12-02 PROBLEM — J96.01 ACUTE RESPIRATORY FAILURE WITH HYPOXIA AND HYPERCAPNIA (HCC): Status: ACTIVE | Noted: 2022-12-02

## 2022-12-02 PROBLEM — E87.70 HYPERVOLEMIA: Status: ACTIVE | Noted: 2022-12-02

## 2022-12-02 PROBLEM — I50.810 RVF (RIGHT VENTRICULAR FAILURE) (HCC): Status: ACTIVE | Noted: 2022-12-02

## 2022-12-02 PROBLEM — J96.02 ACUTE RESPIRATORY FAILURE WITH HYPOXIA AND HYPERCAPNIA (HCC): Status: ACTIVE | Noted: 2022-12-02

## 2022-12-02 LAB
ANION GAP SERPL CALCULATED.3IONS-SCNC: 11 MMOL/L (ref 3–16)
BASE EXCESS ARTERIAL: -1 (ref -3–3)
BASE EXCESS VENOUS: 1.8 MMOL/L (ref -2–3)
BUN BLDV-MCNC: 34 MG/DL (ref 7–20)
CALCIUM SERPL-MCNC: 9.6 MG/DL (ref 8.3–10.6)
CARBOXYHEMOGLOBIN: 1.7 % (ref 0–1.5)
CHLORIDE BLD-SCNC: 105 MMOL/L (ref 99–110)
CO2: 26 MMOL/L (ref 21–32)
CREAT SERPL-MCNC: 2.2 MG/DL (ref 0.8–1.3)
EKG ATRIAL RATE: 88 BPM
EKG DIAGNOSIS: NORMAL
EKG P AXIS: 73 DEGREES
EKG P-R INTERVAL: 198 MS
EKG Q-T INTERVAL: 366 MS
EKG QRS DURATION: 90 MS
EKG QTC CALCULATION (BAZETT): 442 MS
EKG R AXIS: 101 DEGREES
EKG T AXIS: -67 DEGREES
EKG VENTRICULAR RATE: 88 BPM
GFR SERPL CREATININE-BSD FRML MDRD: 28 ML/MIN/{1.73_M2}
GLUCOSE BLD-MCNC: 133 MG/DL (ref 70–99)
GLUCOSE BLD-MCNC: 137 MG/DL (ref 70–99)
GLUCOSE BLD-MCNC: 139 MG/DL (ref 70–99)
GLUCOSE BLD-MCNC: 146 MG/DL (ref 70–99)
GLUCOSE BLD-MCNC: 164 MG/DL (ref 70–99)
GLUCOSE BLD-MCNC: 171 MG/DL (ref 70–99)
GLUCOSE BLD-MCNC: 189 MG/DL (ref 70–99)
HCO3 ARTERIAL: 25.4 MMOL/L (ref 21–29)
HCO3 VENOUS: 28.4 MMOL/L (ref 24–28)
HEMOGLOBIN, VEN, REDUCED: 8.2 %
LACTIC ACID: 0.9 MMOL/L (ref 0.4–2)
METHEMOGLOBIN VENOUS: 0.1 % (ref 0–1.5)
O2 SAT, ARTERIAL: 92 % (ref 93–100)
O2 SAT, VEN: 92 %
PCO2 ARTERIAL: 52.2 MM HG (ref 35–45)
PCO2, VEN: 53.7 MMHG (ref 41–51)
PERFORMED ON: ABNORMAL
PH ARTERIAL: 7.29 (ref 7.35–7.45)
PH VENOUS: 7.33 (ref 7.35–7.45)
PO2 ARTERIAL: 72.7 MM HG (ref 75–108)
PO2, VEN: 65.2 MMHG (ref 25–40)
POC SAMPLE TYPE: ABNORMAL
POTASSIUM REFLEX MAGNESIUM: 4.4 MMOL/L (ref 3.5–5.1)
PROCALCITONIN: 41.16 NG/ML (ref 0–0.15)
RAPID INFLUENZA  B AGN: NEGATIVE
RAPID INFLUENZA A AGN: NEGATIVE
REPORT: NORMAL
SARS-COV-2, NAAT: NOT DETECTED
SODIUM BLD-SCNC: 142 MMOL/L (ref 136–145)
T4 FREE: 0.9 NG/DL (ref 0.9–1.8)
TCO2 ARTERIAL: 27 MMOL/L
TCO2 CALC VENOUS: 30 MMOL/L
TROPONIN: 0.04 NG/ML
TROPONIN: 0.05 NG/ML
TROPONIN: 0.05 NG/ML
TSH REFLEX: 4.4 UIU/ML (ref 0.27–4.2)
VITAMIN B-12: 716 PG/ML (ref 211–911)

## 2022-12-02 PROCEDURE — 36600 WITHDRAWAL OF ARTERIAL BLOOD: CPT

## 2022-12-02 PROCEDURE — 6360000002 HC RX W HCPCS: Performed by: PHYSICIAN ASSISTANT

## 2022-12-02 PROCEDURE — 6360000002 HC RX W HCPCS: Performed by: EMERGENCY MEDICINE

## 2022-12-02 PROCEDURE — 87150 DNA/RNA AMPLIFIED PROBE: CPT

## 2022-12-02 PROCEDURE — 2060000000 HC ICU INTERMEDIATE R&B

## 2022-12-02 PROCEDURE — 99222 1ST HOSP IP/OBS MODERATE 55: CPT | Performed by: INTERNAL MEDICINE

## 2022-12-02 PROCEDURE — 97166 OT EVAL MOD COMPLEX 45 MIN: CPT

## 2022-12-02 PROCEDURE — 6360000004 HC RX CONTRAST MEDICATION

## 2022-12-02 PROCEDURE — 2700000000 HC OXYGEN THERAPY PER DAY

## 2022-12-02 PROCEDURE — 83605 ASSAY OF LACTIC ACID: CPT

## 2022-12-02 PROCEDURE — 87186 SC STD MICRODIL/AGAR DIL: CPT

## 2022-12-02 PROCEDURE — 87040 BLOOD CULTURE FOR BACTERIA: CPT

## 2022-12-02 PROCEDURE — 6370000000 HC RX 637 (ALT 250 FOR IP): Performed by: PHYSICIAN ASSISTANT

## 2022-12-02 PROCEDURE — 6360000002 HC RX W HCPCS: Performed by: INTERNAL MEDICINE

## 2022-12-02 PROCEDURE — 36415 COLL VENOUS BLD VENIPUNCTURE: CPT

## 2022-12-02 PROCEDURE — 97530 THERAPEUTIC ACTIVITIES: CPT

## 2022-12-02 PROCEDURE — 2580000003 HC RX 258: Performed by: PHYSICIAN ASSISTANT

## 2022-12-02 PROCEDURE — C8929 TTE W OR WO FOL WCON,DOPPLER: HCPCS

## 2022-12-02 PROCEDURE — 80048 BASIC METABOLIC PNL TOTAL CA: CPT

## 2022-12-02 PROCEDURE — 94640 AIRWAY INHALATION TREATMENT: CPT

## 2022-12-02 PROCEDURE — 51798 US URINE CAPACITY MEASURE: CPT

## 2022-12-02 PROCEDURE — 82607 VITAMIN B-12: CPT

## 2022-12-02 PROCEDURE — 87635 SARS-COV-2 COVID-19 AMP PRB: CPT

## 2022-12-02 PROCEDURE — 2580000003 HC RX 258: Performed by: EMERGENCY MEDICINE

## 2022-12-02 PROCEDURE — 51702 INSERT TEMP BLADDER CATH: CPT

## 2022-12-02 PROCEDURE — 94761 N-INVAS EAR/PLS OXIMETRY MLT: CPT

## 2022-12-02 PROCEDURE — 97162 PT EVAL MOD COMPLEX 30 MIN: CPT

## 2022-12-02 PROCEDURE — 82803 BLOOD GASES ANY COMBINATION: CPT

## 2022-12-02 PROCEDURE — 70450 CT HEAD/BRAIN W/O DYE: CPT

## 2022-12-02 PROCEDURE — 99222 1ST HOSP IP/OBS MODERATE 55: CPT | Performed by: HOSPITALIST

## 2022-12-02 PROCEDURE — 97535 SELF CARE MNGMENT TRAINING: CPT

## 2022-12-02 PROCEDURE — 82947 ASSAY GLUCOSE BLOOD QUANT: CPT

## 2022-12-02 PROCEDURE — 71250 CT THORAX DX C-: CPT

## 2022-12-02 PROCEDURE — 6370000000 HC RX 637 (ALT 250 FOR IP): Performed by: HOSPITALIST

## 2022-12-02 PROCEDURE — 84484 ASSAY OF TROPONIN QUANT: CPT

## 2022-12-02 PROCEDURE — 87804 INFLUENZA ASSAY W/OPTIC: CPT

## 2022-12-02 RX ORDER — SODIUM CHLORIDE 9 MG/ML
INJECTION, SOLUTION INTRAVENOUS CONTINUOUS
Status: ACTIVE | OUTPATIENT
Start: 2022-12-02 | End: 2022-12-02

## 2022-12-02 RX ORDER — ALBUTEROL SULFATE 2.5 MG/3ML
2.5 SOLUTION RESPIRATORY (INHALATION) EVERY 6 HOURS PRN
Status: DISCONTINUED | OUTPATIENT
Start: 2022-12-02 | End: 2022-12-07 | Stop reason: HOSPADM

## 2022-12-02 RX ORDER — INSULIN LISPRO 100 [IU]/ML
0-4 INJECTION, SOLUTION INTRAVENOUS; SUBCUTANEOUS
Status: DISCONTINUED | OUTPATIENT
Start: 2022-12-02 | End: 2022-12-07 | Stop reason: HOSPADM

## 2022-12-02 RX ORDER — ACETAMINOPHEN 325 MG/1
650 TABLET ORAL EVERY 6 HOURS PRN
Status: DISCONTINUED | OUTPATIENT
Start: 2022-12-02 | End: 2022-12-07 | Stop reason: HOSPADM

## 2022-12-02 RX ORDER — FUROSEMIDE 10 MG/ML
60 INJECTION INTRAMUSCULAR; INTRAVENOUS 2 TIMES DAILY
Status: COMPLETED | OUTPATIENT
Start: 2022-12-02 | End: 2022-12-03

## 2022-12-02 RX ORDER — ONDANSETRON 2 MG/ML
4 INJECTION INTRAMUSCULAR; INTRAVENOUS EVERY 6 HOURS PRN
Status: DISCONTINUED | OUTPATIENT
Start: 2022-12-02 | End: 2022-12-07 | Stop reason: HOSPADM

## 2022-12-02 RX ORDER — ACETAMINOPHEN 650 MG/1
650 SUPPOSITORY RECTAL EVERY 6 HOURS PRN
Status: DISCONTINUED | OUTPATIENT
Start: 2022-12-02 | End: 2022-12-07 | Stop reason: HOSPADM

## 2022-12-02 RX ORDER — TORSEMIDE 100 MG/1
100 TABLET ORAL ONCE
Status: COMPLETED | OUTPATIENT
Start: 2022-12-02 | End: 2022-12-02

## 2022-12-02 RX ORDER — AZITHROMYCIN 250 MG/1
500 TABLET, FILM COATED ORAL EVERY 24 HOURS
Status: COMPLETED | OUTPATIENT
Start: 2022-12-03 | End: 2022-12-05

## 2022-12-02 RX ORDER — HEPARIN SODIUM 5000 [USP'U]/ML
5000 INJECTION, SOLUTION INTRAVENOUS; SUBCUTANEOUS EVERY 8 HOURS SCHEDULED
Status: DISCONTINUED | OUTPATIENT
Start: 2022-12-02 | End: 2022-12-07 | Stop reason: HOSPADM

## 2022-12-02 RX ORDER — ATORVASTATIN CALCIUM 20 MG/1
10 TABLET, FILM COATED ORAL DAILY
Status: DISCONTINUED | OUTPATIENT
Start: 2022-12-02 | End: 2022-12-07 | Stop reason: HOSPADM

## 2022-12-02 RX ORDER — INSULIN LISPRO 100 [IU]/ML
0-4 INJECTION, SOLUTION INTRAVENOUS; SUBCUTANEOUS NIGHTLY
Status: DISCONTINUED | OUTPATIENT
Start: 2022-12-02 | End: 2022-12-07 | Stop reason: HOSPADM

## 2022-12-02 RX ORDER — SODIUM CHLORIDE 0.9 % (FLUSH) 0.9 %
5-40 SYRINGE (ML) INJECTION EVERY 12 HOURS SCHEDULED
Status: DISCONTINUED | OUTPATIENT
Start: 2022-12-02 | End: 2022-12-07 | Stop reason: HOSPADM

## 2022-12-02 RX ORDER — ALLOPURINOL 100 MG/1
200 TABLET ORAL DAILY
Status: DISCONTINUED | OUTPATIENT
Start: 2022-12-02 | End: 2022-12-07 | Stop reason: HOSPADM

## 2022-12-02 RX ORDER — ONDANSETRON 4 MG/1
4 TABLET, ORALLY DISINTEGRATING ORAL EVERY 8 HOURS PRN
Status: DISCONTINUED | OUTPATIENT
Start: 2022-12-02 | End: 2022-12-07 | Stop reason: HOSPADM

## 2022-12-02 RX ORDER — LEVOTHYROXINE SODIUM 0.03 MG/1
25 TABLET ORAL DAILY
Status: DISCONTINUED | OUTPATIENT
Start: 2022-12-02 | End: 2022-12-07 | Stop reason: HOSPADM

## 2022-12-02 RX ORDER — SODIUM CHLORIDE 0.9 % (FLUSH) 0.9 %
5-40 SYRINGE (ML) INJECTION PRN
Status: DISCONTINUED | OUTPATIENT
Start: 2022-12-02 | End: 2022-12-07 | Stop reason: HOSPADM

## 2022-12-02 RX ORDER — CARVEDILOL 25 MG/1
25 TABLET ORAL 2 TIMES DAILY
Status: DISCONTINUED | OUTPATIENT
Start: 2022-12-02 | End: 2022-12-05

## 2022-12-02 RX ORDER — DEXTROSE MONOHYDRATE 100 MG/ML
INJECTION, SOLUTION INTRAVENOUS CONTINUOUS PRN
Status: DISCONTINUED | OUTPATIENT
Start: 2022-12-02 | End: 2022-12-07 | Stop reason: HOSPADM

## 2022-12-02 RX ORDER — IPRATROPIUM BROMIDE AND ALBUTEROL SULFATE 2.5; .5 MG/3ML; MG/3ML
1 SOLUTION RESPIRATORY (INHALATION) 3 TIMES DAILY
Status: DISCONTINUED | OUTPATIENT
Start: 2022-12-02 | End: 2022-12-03

## 2022-12-02 RX ORDER — POLYETHYLENE GLYCOL 3350 17 G/17G
17 POWDER, FOR SOLUTION ORAL DAILY PRN
Status: DISCONTINUED | OUTPATIENT
Start: 2022-12-02 | End: 2022-12-04

## 2022-12-02 RX ORDER — SODIUM CHLORIDE 9 MG/ML
INJECTION, SOLUTION INTRAVENOUS PRN
Status: DISCONTINUED | OUTPATIENT
Start: 2022-12-02 | End: 2022-12-07 | Stop reason: HOSPADM

## 2022-12-02 RX ORDER — IPRATROPIUM BROMIDE AND ALBUTEROL SULFATE 2.5; .5 MG/3ML; MG/3ML
1 SOLUTION RESPIRATORY (INHALATION)
Status: DISCONTINUED | OUTPATIENT
Start: 2022-12-02 | End: 2022-12-02

## 2022-12-02 RX ORDER — TAMSULOSIN HYDROCHLORIDE 0.4 MG/1
0.4 CAPSULE ORAL DAILY
Status: DISCONTINUED | OUTPATIENT
Start: 2022-12-02 | End: 2022-12-07 | Stop reason: HOSPADM

## 2022-12-02 RX ADMIN — AZITHROMYCIN MONOHYDRATE 500 MG: 500 INJECTION, POWDER, LYOPHILIZED, FOR SOLUTION INTRAVENOUS at 01:07

## 2022-12-02 RX ADMIN — SODIUM CHLORIDE: 9 INJECTION, SOLUTION INTRAVENOUS at 02:42

## 2022-12-02 RX ADMIN — TORSEMIDE 100 MG: 100 TABLET ORAL at 02:44

## 2022-12-02 RX ADMIN — CARVEDILOL 25 MG: 25 TABLET, FILM COATED ORAL at 08:30

## 2022-12-02 RX ADMIN — HEPARIN SODIUM 5000 UNITS: 5000 INJECTION INTRAVENOUS; SUBCUTANEOUS at 20:50

## 2022-12-02 RX ADMIN — LEVOTHYROXINE SODIUM 25 MCG: 0.03 TABLET ORAL at 08:30

## 2022-12-02 RX ADMIN — HEPARIN SODIUM 5000 UNITS: 5000 INJECTION INTRAVENOUS; SUBCUTANEOUS at 16:46

## 2022-12-02 RX ADMIN — CARVEDILOL 25 MG: 25 TABLET, FILM COATED ORAL at 02:43

## 2022-12-02 RX ADMIN — SODIUM CHLORIDE, PRESERVATIVE FREE 10 ML: 5 INJECTION INTRAVENOUS at 20:50

## 2022-12-02 RX ADMIN — CEFTRIAXONE 1000 MG: 1 INJECTION, POWDER, FOR SOLUTION INTRAMUSCULAR; INTRAVENOUS at 00:22

## 2022-12-02 RX ADMIN — HEPARIN SODIUM 5000 UNITS: 5000 INJECTION INTRAVENOUS; SUBCUTANEOUS at 05:47

## 2022-12-02 RX ADMIN — CARVEDILOL 25 MG: 25 TABLET, FILM COATED ORAL at 20:50

## 2022-12-02 RX ADMIN — IPRATROPIUM BROMIDE AND ALBUTEROL SULFATE 1 AMPULE: .5; 3 SOLUTION RESPIRATORY (INHALATION) at 08:13

## 2022-12-02 RX ADMIN — PERFLUTREN 1.5 ML: 6.52 INJECTION, SUSPENSION INTRAVENOUS at 16:45

## 2022-12-02 RX ADMIN — IPRATROPIUM BROMIDE AND ALBUTEROL SULFATE 1 AMPULE: 2.5; .5 SOLUTION RESPIRATORY (INHALATION) at 13:07

## 2022-12-02 RX ADMIN — IPRATROPIUM BROMIDE AND ALBUTEROL SULFATE 1 AMPULE: 2.5; .5 SOLUTION RESPIRATORY (INHALATION) at 20:35

## 2022-12-02 RX ADMIN — ALLOPURINOL 200 MG: 100 TABLET ORAL at 08:30

## 2022-12-02 RX ADMIN — FUROSEMIDE 60 MG: 10 INJECTION, SOLUTION INTRAMUSCULAR; INTRAVENOUS at 19:05

## 2022-12-02 RX ADMIN — TAMSULOSIN HYDROCHLORIDE 0.4 MG: 0.4 CAPSULE ORAL at 08:30

## 2022-12-02 RX ADMIN — ATORVASTATIN CALCIUM 10 MG: 20 TABLET, FILM COATED ORAL at 08:30

## 2022-12-02 ASSESSMENT — PAIN SCALES - WONG BAKER
WONGBAKER_NUMERICALRESPONSE: 0
WONGBAKER_NUMERICALRESPONSE: 0

## 2022-12-02 NOTE — PROGRESS NOTES
Internal Medicine Progress Note    Admit Date: 12/1/2022  Day: 0   Diet: Diet NPO Exceptions are: Sips of Water with Meds    CC: SOB    Interval history: No acute overnight events. Vitals also remained stable overnight. Patient seen and examined at beside. Patient was resting comfortably in his bed. Patient is very hard of hearing and does not have his hearing aids at the hospital. Increased labor of breathing noted on physical exam. Patient remains hemodynamically stable.       Medications:     Scheduled Meds:   allopurinol  200 mg Oral Daily    carvedilol  25 mg Oral BID    levothyroxine  25 mcg Oral Daily    atorvastatin  10 mg Oral Daily    tamsulosin  0.4 mg Oral Daily    torsemide  50 mg Oral Nightly    sodium chloride flush  5-40 mL IntraVENous 2 times per day    [START ON 12/3/2022] cefTRIAXone (ROCEPHIN) IV  1,000 mg IntraVENous Q24H    And    [START ON 12/3/2022] azithromycin  500 mg Oral Q24H    ipratropium-albuterol  1 ampule Inhalation Q4H WA    heparin (porcine)  5,000 Units SubCUTAneous 3 times per day    insulin lispro  0-4 Units SubCUTAneous TID WC    insulin lispro  0-4 Units SubCUTAneous Nightly     Continuous Infusions:   sodium chloride      dextrose      sodium chloride 100 mL/hr at 12/02/22 0242     PRN Meds:sodium chloride flush, sodium chloride, ondansetron **OR** ondansetron, polyethylene glycol, acetaminophen **OR** acetaminophen, glucose, dextrose bolus **OR** dextrose bolus, glucagon (rDNA), dextrose    Objective:   Vitals:   T-max:  Patient Vitals for the past 8 hrs:   BP Temp Temp src Pulse Resp SpO2 Height Weight   12/02/22 0827 113/67 98.1 °F (36.7 °C) Oral 74 20 95 % -- --   12/02/22 0820 -- -- -- -- -- 94 % -- --   12/02/22 0405 (!) 144/72 98.7 °F (37.1 °C) Oral 79 22 93 % -- --   12/02/22 0245 -- -- -- -- -- -- 5' 6\" (1.676 m) 271 lb 2.7 oz (123 kg)   12/02/22 0215 118/78 98.4 °F (36.9 °C) Oral 82 20 94 % -- --       Intake/Output Summary (Last 24 hours) at 12/2/2022 0117  Last data filed at 12/2/2022 0720  Gross per 24 hour   Intake --   Output 150 ml   Net -150 ml       Physical Exam  Constitutional:       Appearance: Normal appearance. He is morbidly obese. Interventions: Nasal cannula in place. HENT:      Head: Normocephalic and atraumatic. Nose: Nose normal.      Mouth/Throat:      Mouth: Mucous membranes are moist.      Pharynx: Oropharynx is clear. Eyes:      Pupils: Pupils are equal, round, and reactive to light. Cardiovascular:      Rate and Rhythm: Normal rate and regular rhythm. Pulses: Normal pulses. Heart sounds: Normal heart sounds. Pulmonary:      Effort: Accessory muscle usage present. Abdominal:      General: Abdomen is protuberant. Bowel sounds are normal.      Palpations: Abdomen is soft. Musculoskeletal:      Right lower leg: Edema present. Left lower leg: Edema present. Skin:     General: Skin is warm and dry. Neurological:      Mental Status: He is alert. Psychiatric:         Attention and Perception: Attention normal.         Mood and Affect: Mood normal.         Behavior: Behavior normal.         LABS:    CBC:   Recent Labs     12/01/22 2223   WBC 14.1*   HGB 11.1*   HCT 34.8*      MCV 86.0     Renal:    Recent Labs     12/01/22 2223 12/02/22  0504    142   K 4.8 4.4    105   CO2 29 26   BUN 31* 34*   CREATININE 2.1* 2.2*   GLUCOSE 141* 164*   CALCIUM 10.4 9.6   ANIONGAP 10 11     Hepatic:   Recent Labs     12/01/22 2223   AST 12*   ALT <5*   BILITOT 0.8   BILIDIR <0.2   PROT 8.1   LABALBU 4.1   ALKPHOS 64     Troponin:   Recent Labs     12/01/22 2223 12/02/22  0504   TROPONINI 0.04* 0.05*     Pro-calcitonin: 41.16 ng/mL  -----------------------------------------------------------------  RAD:   XR CHEST PORTABLE   Final Result      1. Bibasilar airspace disease with small bilateral pleural effusions.       CT HEAD WO CONTRAST    (Results Pending)   CT CHEST WO CONTRAST    (Results Pending) Assessment/Plan:   Jamie Reyes is a 80 y.o. male w/ multiple chronic co morbidities p/w hypoxia and questionable AMS. Dx CAP. Treatment with IVF, Abx, O2     Acute hypoxic hypercapnic respiratory failure  Suspected CAP  Not on O2 at home. - CXR with bibasilar airspace disease. VBG with pH 7.33 (initially 7.3), pCO2 53.7 mmHg (initially 64 mmHg). -Pro-calcitonin 41.16 ng/mL  -COVID and Influenza negative  - will continue Rocephin + azithromycin  - infectious workup pending: strep PNA Ag, legionella, blood Cx, UA w/ reflex  - Continue supp O2 to maintain O2 >92% and wean as possible  - Duonebs  - gentle IVF     Questionable AMS  - Head CT pending  - B12, TSH, UA pending     MARY ANN on CKD  - Cre 2.2 (Baseline ~1.6)  - check Baltazar Honor to calculate FeNa (UA pending)  - gentle IVF    HFpEF, possibly exacerbated  - resume carvedilol, torsemide  - I&O, Daily weights  - optimize lytes ( Mag >2, K >4) and monitor with BMP    Troponinemia  - Trop 0.04(12/01/22), 0.05 (12/02/2022). Will trend  - EKG with T wave inversions in inferior leads. 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 Hypothyroidism  - resume synthroid     GERD  - Protonix     HTN  - resume home BP regimen     HLD  - resume statin     DM2  - not on any medications. Last HgbA1c 7.2 ub 7/2022  - Hypoglycemia protocol  - will add LDSSI with Accuchecks      Code Status:Full Code  FEN: Diet NPO Exceptions are: Sips of Water with Meds  PPX:  SQH  DISPO: [facility]    Glory Amour  Internal Medicine MS3  12/02/22  8:56 AM      Patient was seen and examined independently of the medical student and I agree with their physical exam findings and assessment and plan. Mark Vargas DO PGY-1     This patient will be staffed and discussed with Parviz Brooks MD.      Addendum to Resident H& P/Progress note:  I have personally seen,examined and evaluated the patient.  I have reviewed the current history, physical findings, labs and assessment and plan and agree with note as documented by MS 3, Doreen, and resident MD, Dr Alee Benjamin MD, [address]

## 2022-12-02 NOTE — PROGRESS NOTES
Physical Therapy  Facility/Department: [place] [address] [facility]  Physical Therapy Initial Assessment and Treatment    Name: Arsen Shook  : 1934  MRN: [id number]  Date of Service: 2022    Discharge Recommendations:    Arsen Shook scored a  on the AM-PAC short mobility form. Current research shows that an AM-PAC score of 17 or less is typically not associated with a discharge to the patient's home setting. Based on the patient's AM-PAC score and their current functional mobility deficits, it is recommended that the patient have 3-5 sessions per week of Physical Therapy at d/c to increase the patient's independence. Please see assessment section for further patient specific details. If patient discharges prior to next session this note will serve as a discharge summary. Please see below for the latest assessment towards goals. PT Equipment Recommendations  Equipment Needed:  (defer)      Patient Diagnosis(es): The primary encounter diagnosis was Pneumonia due to infectious organism, unspecified laterality, unspecified part of lung. A diagnosis of Hypoxia was also pertinent to this visit.   Past Medical History:  has a past medical history of Anemia, Antineutrophil cytoplasmic antibody (ANCA) positive, Atelectasis of left lung, Bell's palsy, Benign non-nodular prostatic hyperplasia with lower urinary tract symptoms, BPH (benign prostatic hypertrophy), Chronic gout without tophus, Chronic kidney disease, stage III (moderate) (HCC), Chronic pain of both knees, Coronary artery calcification seen on CT scan, Diabetes mellitus, type 2 ([address] Diastolic dysfunction, Dyspnea on exertion, Edema, Elevated PSA, Essential hypertension, Gastroesophageal reflux disease without esophagitis, GERD (gastroesophageal reflux disease), Gout, Hepatic cyst, Hypercalcemia, Hyperlipidemia, Hypertension, Insomnia, Localized edema, Morbid obesity due to excess calories ([address] Multiple thyroid nodules, Nocturia, Obesity, Obstructive sleep apnea, Primary osteoarthritis of left knee, Primary osteoarthritis of right knee, Proteinuria, Pulmonary emphysema ([address] Pulmonary nodules, Shortness of breath, Type 2 diabetes mellitus with diabetic chronic kidney disease ([facility] Type 2 diabetes mellitus with stage 3 chronic kidney disease ([facility] Type 2 diabetes mellitus with stage 3 chronic kidney disease, without long-term current use of insulin ([facility] [address] and Vitamin D deficiency. Past Surgical History:  has a past surgical history that includes Colonoscopy; Appendectomy; Umbilical hernia repair; TURP; cyst removal; total nephrectomy; pr colonoscopy flx dx w/collj spec when pfrmd (N/A, 11/6/2018); and Upper gastrointestinal endoscopy (N/A, 11/6/2018). Assessment   Body Structures, Functions, Activity Limitations Requiring Skilled Therapeutic Intervention: Decreased functional mobility ; Decreased strength;Decreased endurance  Assessment: Pt below reported baseline function. Pt reports he is non ambulatory however is normally able to transfer self to w/c independently. Currently needing max assist x 2 to pivot bed to chair. Pt reports he lives alone however safety concerns for pt returning home alone. At risk for falls and not safe to get up alone. Rec cont skilled PT to maximize mobility and independence  Treatment Diagnosis: impaired functional mobility 2/2 decreased strength  Decision Making: Medium Complexity  Requires PT Follow-Up: Yes     Plan   Physcial Therapy Plan  General Plan:  (2-5)  Current Treatment Recommendations: Strengthening, Functional mobility training, Endurance training, Patient/Caregiver education & training, Safety education & training  Safety Devices  Type of Devices: Left in chair, Call light within reach, Chair alarm in place, Nurse notified     Restrictions  Position Activity Restriction  Other position/activity restrictions:  Up with assist     Subjective   General  Additional Pertinent Hx: Pt presented to ED with shortness of breath. According to EMS patient had sats of 80% on room air, was placed on 6 L nasal cannula. CXR: Bibasilar airspace disease with small bilateral pleural effusions. Head CT: pending. PMH:DM2, AVEL, HFpEF, CKD, HTN, HLD, GERD, obesity  Referring Practitioner: Ethelyn Schaumann, MD  Referral Date : 12/02/22  Subjective  Subjective: Pt found supine. Very [place]. Agreeable to PT with encouragement. \"I appreciate what you are doing but I don't think you can help me. \"  Pt reports he has not been able to walk \"for 6 years since I fell. \"   Denies pain         Social/Functional History  Social/Functional History  Lives With: Alone  Type of Home: Apartment  Home Access: Elevator  Bathroom Accessibility: Wheelchair accessible  Home Equipment: Meteo Protect  Has the patient had two or more falls in the past year or any fall with injury in the past year?: Yes (2 falls last six months)  ADL Assistance: Needs assistance (daughter assists with bathing, pt does not wear footwear)  Homemaking Assistance: Needs assistance (daughter assists with cleaning)  Ambulation Assistance:  (reports has not walked in 6 years. Uses w/c)  Transfer Assistance: Independent (pivots to w/c)  Additional Comments: Above information  mildly difficult to obtain 2/2 [place]. Most information obtained via written communication. Vision/Hearing  Vision  Vision: Within Functional Limits  Hearing  Hearing:  (Pt very [place].   Communicates better with written notes)    Cognition     WFL    Objective                 AROM RLE (degrees)  RLE AROM: WFL (LE edema)  AROM LLE (degrees)  LLE AROM : WFL (LE edema)  Strength RLE  Strength RLE:  (decreased grossly - 2+/5 to 3/5 grossly)  Strength LLE  Strength LLE:  (decreased grossly - 2+/5 to 3/5 grossly)          Bed mobility  Supine to Sit: Minimal assistance (HOB elevated with rail, increased time and effort)  Scooting: Contact guard assistance  Transfers  Sit to Stand: Dependent/Total (mod assist x 2 from chair with XANDER steady)  Stand to Sit: Dependent/Total (mod assist x 2)  Bed to Chair: Dependent/Total (sit pivot with max assist x 2)        Balance  Standing - Static:  (Stood within [address] steady for 30 sec with mod to max assist - cues for upright posture)         Treatment included: bed mobility, transfers, pt education    OutComes Score                                                  AM-PAC Score  AM-PAC Inpatient Mobility Raw Score : 11 (12/02/22 1133)  AM-PAC Inpatient T-Scale Score : 33.86 (12/02/22 1133)  Mobility Inpatient CMS 0-100% Score: 72.57 (12/02/22 1133)  Mobility Inpatient CMS G-Code Modifier : CL (12/02/22 1133)          T[place] Score       Goals  Short Term Goals  Time Frame for Short Term Goals: By discharge  Short Term Goal 1: Sup to sit SBA  Short Term Goal 2: Pt will transfer bed to chair with min assist x 1       Education  Patient Education  Education Given To: Patient  Education Provided: Role of Therapy;Plan of Care  Education Provided Comments: importance of OOB, use of call light  Education Method: Verbal  Education Outcome: Verbalized understanding;Continued education needed      Therapy Time   Individual Concurrent Group Co-treatment   Time In 0825         Time Out 0933         Minutes 68             Timed Code Treatment Minutes: 53      Total Treatment Minutes:  Carrol Collado [facility], PT

## 2022-12-02 NOTE — CONSULTS
[address]         Reason for Consultation/Chief Complaint: \"I have been having sob. \"       History of Present Illness:  Zahira Saeed is a 80 y.o. patient who presented to the hospital with complaints of sob and swelling. .  The patient has no chest pain. Pt is very [place]. Pt has TIIDM AVEL HTN, HLD GERD, AVEL  hx or HFpEF. Was grunting and hypoxic and needed 6 l O2 by NC to keep stats up from 80% on RA. Past Medical History:   has a past medical history of Anemia, Antineutrophil cytoplasmic antibody (ANCA) positive, Atelectasis of left lung, Bell's palsy, Benign non-nodular prostatic hyperplasia with lower urinary tract symptoms, BPH (benign prostatic hypertrophy), Chronic gout without tophus, Chronic kidney disease, stage III (moderate) (HCC), Chronic pain of both knees, Coronary artery calcification seen on CT scan, Diabetes mellitus, type 2 ([address] Diastolic dysfunction, Dyspnea on exertion, Edema, Elevated PSA, Essential hypertension, Gastroesophageal reflux disease without esophagitis, GERD (gastroesophageal reflux disease), Gout, Hepatic cyst, Hypercalcemia, Hyperlipidemia, Hypertension, Insomnia, Localized edema, Morbid obesity due to excess calories ([address] Multiple thyroid nodules, Nocturia, Obesity, Obstructive sleep apnea, Primary osteoarthritis of left knee, Primary osteoarthritis of right knee, Proteinuria, Pulmonary emphysema ([address] Pulmonary nodules, Shortness of breath, Type 2 diabetes mellitus with diabetic chronic kidney disease ([address] Type 2 diabetes mellitus with stage 3 chronic kidney disease ([address] Type 2 diabetes mellitus with stage 3 chronic kidney disease, without long-term current use of insulin ([address] and Vitamin D deficiency. Surgical History:   has a past surgical history that includes Colonoscopy;  Appendectomy; Umbilical hernia repair; TURP; cyst removal; total nephrectomy; pr colonoscopy flx dx w/collj spec when pfrmd (N/A, 11/6/2018); and Upper gastrointestinal endoscopy (N/A, 11/6/2018). Social History:   reports that he has quit smoking. His smoking use included cigarettes. He has never used smokeless tobacco. He reports that he does not drink alcohol and does not use drugs. Family History:  No evidence for sudden cardiac death or premature CAD    Home Medications:  Were reviewed and are listed in nursing record. and/or listed below  Prior to Admission medications    Medication Sig Start Date End Date Taking?  Authorizing Provider   simvastatin (ZOCOR) 20 MG tablet TAKE ONE TABLET BY MOUTH ONCE NIGHTLY 11/7/22   Emily Huston MD   levothyroxine (SYNTHROID) 25 MCG tablet Take 1 tablet by mouth daily 9/27/22   MD BRITNEY Sanchez MEAGAN PLUS strip CHECK BLOOD SUGARS THREE TIMES A DAY BEFORE A MEAL 8/20/22   Indra Nuñez MD   torsemide (DEMADEX) 100 MG tablet Take 1 tablet (100 mg) in the morning and 1/2 tablet (50 mg) in the late afternoon 12/21/21   Emily Huston MD   tamsulosin [facility]) 0.4 MG capsule Take 1 capsule by mouth daily 12/21/21   Emily Huston MD   carvedilol (COREG) 25 MG tablet Take 1 tablet by mouth 2 times daily 12/21/21   MD Britney Kingsley Softclix Lancets MISC Check 3 times day before meals 9/2/21   Indra Nuñez MD   allopurinol (ZYLOPRIM) 100 MG tablet TAKE TWO TABLETS BY MOUTH DAILY 5/26/21   Brandon Omalley MD        [facility] Medications:   allopurinol  200 mg Oral Daily    carvedilol  25 mg Oral BID    levothyroxine  25 mcg Oral Daily    atorvastatin  10 mg Oral Daily    tamsulosin  0.4 mg Oral Daily    torsemide  50 mg Oral Nightly    sodium chloride flush  5-40 mL IntraVENous 2 times per day    [START ON 12/3/2022] cefTRIAXone (ROCEPHIN) IV  1,000 mg IntraVENous Q24H    And    [START ON 12/3/2022] azithromycin  500 mg Oral Q24H    heparin (porcine)  5,000 Units SubCUTAneous 3 times per day    insulin lispro  0-4 Units SubCUTAneous TID WC    insulin lispro  0-4 Units SubCUTAneous Nightly ipratropium-albuterol  1 ampule Inhalation TID     sodium chloride flush, sodium chloride, ondansetron **OR** ondansetron, polyethylene glycol, acetaminophen **OR** acetaminophen, glucose, dextrose bolus **OR** dextrose bolus, glucagon (rDNA), dextrose, albuterol   sodium chloride      dextrose         Allergies:  Patient has no known allergies. Review of Systems:     A 14 ROS obtained and negative except as mentioned in HPI. Constitutional: there has been no unanticipated weight loss. Eyes: No visual changes or diplopia. No scleral icterus. ENT: No Headaches, hearing loss or vertigo. No mouth sores or sore throat. Cardiovascular: No loss of consciousness. No hemoptysis, pleuritic pain, or phlebitis. No CP  Respiratory: No cough or wheezing, no sputum production. No hematemesis. SOB. Gastrointestinal: No abdominal pain, appetite loss, blood in stools. No change in bowel or bladder habits. Genitourinary: No dysuria, or hematuria. Musculoskeletal:  No gait disturbance, weakness or joint complaints. Integumentary: No rash or pruritis. edema  Neurological: No headache, diplopia,numbness or tingling. No change in gait, balance, coordination, mood, affect, memory, mentation, behavior. Psychiatric: No anxiety,  Endocrine: No malaise,  Hematologic/Lymphatic: No abnormal bruising  Allergic/Immunologic: No nasal congestion      Physical Examination:    Vitals:    12/02/22 1644   BP: 110/67   Pulse: 65   Resp: 20   Temp: 98.3 °F (36.8 °C)   SpO2: 94%    Weight: 271 lb 2.7 oz (123 kg)         General Appearance:  Alert, cooperative, no distress, appears stated age   Head:  Normocephalic, without obvious abnormality, atraumatic   Eyes:  PERRL   Nose: Nares normal,   Neck: Supple, JVP elevated    Lungs:   Clear to auscultation bilaterally, respirations unlabored   Chest Wall:  No tenderness or deformity   Heart:  Regular rate and rhythm, normal S1, S2 normal, no murmur, III/VI HSM  No rub.  No S3  gallop Abdomen:   Soft, non-tender, +bowel sounds   Extremities:  + diffuse LE  edema   Pulses: Symmetric  extremities   Skin: no gross lesions or rashes   Pysch: Normal mood and affect   Neurologic: No gross deficits. CN II - XII grossly intact        Labs  CBC:   Lab Results   Component Value Date/Time    WBC 14.1 12/01/2022 10:23 PM    RBC 4.04 12/01/2022 10:23 PM    HGB 11.1 12/01/2022 10:23 PM    HCT 34.8 12/01/2022 10:23 PM    MCV 86.0 12/01/2022 10:23 PM    RDW 15.7 12/01/2022 10:23 PM     12/01/2022 10:23 PM     CMP:    Lab Results   Component Value Date/Time     12/02/2022 05:04 AM    K 4.4 12/02/2022 05:04 AM     12/02/2022 05:04 AM    CO2 26 12/02/2022 05:04 AM    BUN 34 12/02/2022 05:04 AM    CREATININE 2.2 12/02/2022 05:04 AM    GFRAA 46 09/06/2022 02:35 PM    GFRAA 58 05/10/2013 02:49 PM    AGRATIO 1.4 12/01/2021 11:55 AM    LABGLOM 28 12/02/2022 05:04 AM    GLUCOSE 164 12/02/2022 05:04 AM    GLUCOSE 96 10/06/2011 03:30 PM    PROT 8.1 12/01/2022 10:23 PM    PROT 8.6 10/08/2012 03:22 PM    CALCIUM 9.6 12/02/2022 05:04 AM    BILITOT 0.8 12/01/2022 10:23 PM    ALKPHOS 64 12/01/2022 10:23 PM    AST 12 12/01/2022 10:23 PM    ALT <5 12/01/2022 10:23 PM     PT/INR:  No results found for: PTINR  Recent Labs     12/01/22  2223 12/02/22  0504 12/02/22  1255   TROPONINI 0.04* 0.05* 0.04*     ECG :  SR with Right IVCD and diffuse TWI.     Assessment  Patient Active Problem List   Diagnosis    Obstructive sleep apnea    Chronic kidney disease, stage III (moderate) ([facility])    Hyperlipidemia    Insomnia    Proteinuria    Anemia    Fatigue    Dyspnea on exertion    Bilateral leg weakness    Osteoarthritis    Diastolic dysfunction    Elevated PSA    Bell's palsy    Hypoxia    Decreased hearing    Chronic gout without tophus    Essential hypertension    Benign non-nodular prostatic hyperplasia with lower urinary tract symptoms    Gastroesophageal reflux disease without esophagitis    Pulmonary nodules Hepatic cyst    Coronary artery calcification seen on CT scan    Difficulty walking    Diabetes mellitus type 2 in obese (HCC)    Goiter, nontoxic, multinodular    Primary osteoarthritis of right knee    Primary osteoarthritis of left knee    Morbid obesity (HCC)    Pulmonary emphysema (HCC)    Venous stasis dermatitis of both lower extremities    Acute renal failure superimposed on stage 4 chronic kidney disease (HCC)    Antineutrophil cytoplasmic antibody (ANCA) positive    Vitamin D deficiency    Hypercalcemia    Acute respiratory failure, unsp w hypoxia or hypercapnia (HCC)    Acute respiratory failure with hypoxia and hypercapnia (HCC)    Pneumonia due to infectious organism          Impression:  acute d HF. Right sided d hf. Pulmonary HTN.  CRI. Troponin elevation minimal with no chest pain. Recommendations:    I had the opportunity to review the clinical symptoms and presentation of Maki Gonzales. I recommend that the patient undergo further cardiac evaluation with echo. Conitnue diuresis with furosemide IV. Tobacco use was discussed with the patient and educated on the negative effects. I have asked the patient to not utilize these agents. Thank you for allowing to us to participate in the care or Maki Gonzales. All questions and concerns were addressed to the patient/family. Alternatives to my treatment were discussed. The note was completed using EMR. Every effort was made to ensure accuracy; however, inadvertent computerized transcription errors may be present.        Leonardo Munoz MD [facility] [place]

## 2022-12-02 NOTE — ED PROVIDER NOTES
[address] ENCOUNTER          ATTENDING PHYSICIAN NOTE       Date of evaluation: 12/1/2022    ADDENDUM:      Care of this patient was assumed from Dr. Claude Amy. The patient was seen for Shortness of Breath (Sats 80's on RA per EMS // placed onto 6L NC), Altered Mental Status, and Fatigue  . The patient's initial evaluation and plan have been discussed with the prior provider who initially evaluated the patient. Nursing Notes, Past Medical Hx, Past Surgical Hx, Social Hx, Allergies, and Family Hx were all reviewed. Diagnostic Results     EKG       RADIOLOGY:  XR CHEST PORTABLE   Final Result      1. Bibasilar airspace disease with small bilateral pleural effusions.       CT HEAD WO CONTRAST    (Results Pending)       LABS:   Results for orders placed or performed during the hospital encounter of 12/01/22   COVID-19, Rapid    Specimen: Nasopharyngeal Swab   Result Value Ref Range    SARS-CoV-2, NAAT Not Detected Not Detected   Rapid Flu Swab    Specimen: Nasopharyngeal   Result Value Ref Range    Rapid Influenza A Ag Negative Negative    Rapid Influenza B Ag Negative Negative   BMP w/ Reflex to MG   Result Value Ref Range    Sodium 141 136 - 145 mmol/L    Potassium reflex Magnesium 4.8 3.5 - 5.1 mmol/L    Chloride 102 99 - 110 mmol/L    CO2 29 21 - 32 mmol/L    Anion Gap 10 3 - 16    Glucose 141 (H) 70 - 99 mg/dL    BUN 31 (H) 7 - 20 mg/dL    Creatinine 2.1 (H) 0.8 - 1.3 mg/dL    Est, Glom Filt Rate 30 (A) >60    Calcium 10.4 8.3 - 10.6 mg/dL   CBC with Auto Differential   Result Value Ref Range    WBC 14.1 (H) 4.0 - 11.0 K/uL    RBC 4.04 (L) 4.20 - 5.90 M/uL    Hemoglobin 11.1 (L) 13.5 - 17.5 g/dL    Hematocrit 34.8 (L) 40.5 - 52.5 %    MCV 86.0 80.0 - 100.0 fL    MCH 27.4 26.0 - 34.0 pg    MCHC 31.9 31.0 - 36.0 g/dL    RDW 15.7 (H) 12.4 - 15.4 %    Platelets 238 252 - 310 K/uL    MPV 8.9 5.0 - 10.5 fL    Neutrophils % 90.5 %    Lymphocytes % 5.3 %    Monocytes % 4.0 %    Eosinophils % 0.0 %    Basophils % 0.2 %    Neutrophils Absolute 12.7 (H) 1.7 - 7.7 K/uL    Lymphocytes Absolute 0.7 (L) 1.0 - 5.1 K/uL    Monocytes Absolute 0.6 0.0 - 1.3 K/uL    Eosinophils Absolute 0.0 0.0 - 0.6 K/uL    Basophils Absolute 0.0 0.0 - 0.2 K/uL   Hepatic Function Panel   Result Value Ref Range    Total Protein 8.1 6.4 - 8.2 g/dL    Albumin 4.1 3.4 - 5.0 g/dL    Alkaline Phosphatase 64 40 - 129 U/L    ALT <5 (L) 10 - 40 U/L    AST 12 (L) 15 - 37 U/L    Total Bilirubin 0.8 0.0 - 1.0 mg/dL    Bilirubin, Direct <0.2 0.0 - 0.3 mg/dL    Bilirubin, Indirect see below 0.0 - 1.0 mg/dL   Lipase   Result Value Ref Range    Lipase 17.0 13.0 - 60.0 U/L   Lactic Acid   Result Value Ref Range    Lactic Acid 1.5 0.4 - 2.0 mmol/L   Blood gas, venous (Lab)   Result Value Ref Range    pH, [place] 7.300 (L) 7.350 - 7.450    pCO2, [place] 64.3 (H) 41.0 - 51.0 mmHg    pO2, [place] <30.0 25.0 - 40.0 mmHg    HCO3, Venous 31.6 (H) 24.0 - 28.0 mmol/L    Base Excess, [place] 3.6 (H) -2.0 - 3.0 mmol/L    O2 Sat, [place] 23 Not established %    Carboxyhemoglobin 1.6 (H) 0.0 - 1.5 %    MetHgb, [place] 0.3 0.0 - 1.5 %    TC02 (Calc), [place] 34 mmol/L    Hemoglobin, [place], Reduced 75.60 %   Troponin   Result Value Ref Range    Troponin 0.04 (H) <0.01 ng/mL       RECENT VITALS:  BP: 118/78, Temp: 98.4 °F (36.9 °C), Heart Rate: 82, Resp: 20, SpO2: 94 %     Procedures         ED Course          The patient was given the following medications:  Orders Placed This Encounter   Medications    AND Linked Order Group     cefTRIAXone (ROCEPHIN) 1,000 mg in dextrose 5 % 50 mL IVPB mini-bag      Order Specific Question:   Antimicrobial Indications      Answer:   Pneumonia (CAP)     azithromycin (ZITHROMAX) 500 mg in dextrose 5 % 250 mL IVPB      Order Specific Question:   Antimicrobial Indications      Answer:   Pneumonia (CAP)    allopurinol (ZYLOPRIM) tablet 200 mg    carvedilol (COREG) tablet 25 mg    levothyroxine (SYNTHROID) tablet 25 mcg    atorvastatin (LIPITOR) tablet 10 mg tamsulosin (FLOMAX) capsule 0.4 mg    torsemide (DEMADEX) tablet 100 mg    torsemide (DEMADEX) tablet 50 mg    sodium chloride flush 0.9 % injection 5-40 mL    sodium chloride flush 0.9 % injection 5-40 mL    0.9 % sodium chloride infusion    OR Linked Order Group     ondansetron (ZOFRAN-ODT) disintegrating tablet 4 mg     ondansetron (ZOFRAN) injection 4 mg    polyethylene glycol (GLYCOLAX) packet 17 g    OR Linked Order Group     acetaminophen (TYLENOL) tablet 650 mg     acetaminophen (TYLENOL) suppository 650 mg    AND Linked Order Group     cefTRIAXone (ROCEPHIN) 1,000 mg in dextrose 5 % 50 mL IVPB mini-bag      Order Specific Question:   Antimicrobial Indications      Answer:   Pneumonia (CAP)      Order Specific Question:   CAP duration of therapy      Answer:   7 days     azithromycin (ZITHROMAX) tablet 500 mg      Order Specific Question:   Antimicrobial Indications      Answer:   Pneumonia (CAP)      Order Specific Question:   CAP duration of therapy      Answer: Other      Order Specific Question:   Other CAP Duration      Answer:   3 days    ipratropium-albuterol (DUONEB) nebulizer solution 1 ampule     Order Specific Question:   Initiate RT Bronchodilator Protocol     Answer:   Yes - Inpatient Protocol    glucose chewable tablet 16 g    OR Linked Order Group     dextrose bolus 10% 125 mL     dextrose bolus 10% 250 mL    glucagon (rDNA) injection 1 mg    dextrose 10 % infusion    heparin (porcine) injection 5,000 Units    insulin lispro (1 Unit Dial) (HUMALOG/ADMELOG) pen 0-4 Units    insulin lispro (1 Unit Dial) (HUMALOG/ADMELOG) pen 0-4 Units    0.9 % sodium chloride infusion       CONSULTS:  IP CONSULT TO PRIMARY CARE PROVIDER    MEDICAL DECISION MAKING / ASSESSMENT / Nelwyn Ramon is a 80 y.o. male who presents with hypoxemia and dyspnea. Improved with nasal cannula oxygen. Pneumonia on chest x-ray. PCP team to admit for further care. Clinical Impression     1.  Pneumonia due to infectious organism, unspecified laterality, unspecified part of lung    2. Hypoxia        Disposition     PATIENT REFERRED TO:  No follow-up provider specified.     DISCHARGE MEDICATIONS:  Current Discharge Medication List          DISPOSITION Admitted 12/02/2022 12:14:20 AM         Earl Light MD  12/02/22 [id number]

## 2022-12-02 NOTE — RT PROTOCOL NOTE
RT Nebulizer Bronchodilator Protocol Note    There is a bronchodilator order in the chart from a provider indicating to follow the RT Bronchodilator Protocol and there is an Initiate RT Bronchodilator Protocol order as well (see protocol at bottom of note). CXR Findings:  XR CHEST PORTABLE    Result Date: 12/1/2022  1. Bibasilar airspace disease with small bilateral pleural effusions. The findings from the last RT Protocol Assessment were as follows:  Smoking: Chronic pulmonary disease  Respiratory Pattern: Regular pattern and RR 12-20 bpm  Breath Sounds: Intermittent or unilateral wheezes  Cough: Strong, spontaneous, non-productive  Indication for Bronchodilator Therapy: On home bronchodilators  Bronchodilator Assessment Score: 6    Aerosolized bronchodilator medication orders have been revised according to the RT Nebulizer Bronchodilator Protocol below. Respiratory Therapist to perform RT Therapy Protocol Assessment initially then follow the protocol. Repeat RT Therapy Protocol Assessment PRN for score 0-3 or on second treatment, BID, and PRN for scores above 3. No Indications - adjust the frequency to every 6 hours PRN wheezing or bronchospasm, if no treatments needed after 48 hours then discontinue using Per Protocol order mode. If indication present, adjust the RT bronchodilator orders based on the Bronchodilator Assessment Score as indicated below. If a patient is on this medication at home then do not decrease Frequency below that used at home. 0-3 - enter or revise RT bronchodilator order(s) to equivalent RT Bronchodilator order with Frequency of every 4 hours PRN for wheezing or increased work of breathing using Per Protocol order mode.        4-6 - enter or revise RT Bronchodilator order(s) to two equivalent RT bronchodilator orders with one order with BID Frequency and one order with Frequency of every 4 hours PRN wheezing or increased work of breathing using Per Protocol order mode.         7-10 - enter or revise RT Bronchodilator order(s) to two equivalent RT bronchodilator orders with one order with TID Frequency and one order with Frequency of every 4 hours PRN wheezing or increased work of breathing using Per Protocol order mode. 11-13 - enter or revise RT Bronchodilator order(s) to one equivalent RT bronchodilator order with QID Frequency and an Albuterol order with Frequency of every 4 hours PRN wheezing or increased work of breathing using Per Protocol order mode. Greater than 13 - enter or revise RT Bronchodilator order(s) to one equivalent RT bronchodilator order with every 4 hours Frequency and an Albuterol order with Frequency of every 2 hours PRN wheezing or increased work of breathing using Per Protocol order mode. RT to enter RT Home Evaluation for COPD & MDI Assessment order using Per Protocol order mode.     Electronically signed by Ronen Camacho RCP on 12/2/2022 at 5:15 AM

## 2022-12-02 NOTE — DISCHARGE INSTRUCTIONS
You have been discharged with a CAM monitor for monitoring your heart for the next 2 weeks. Please follow instructions that were given at discharge. You will be receiving the oral antibiotic Omnicef for your infection. Please take this twice a day for 7 days. You have been discharged with home oxygen. Please follow-up with your primary care provider in 1 week. If you have any worsening of your symptoms please visit the emergency room. Extra Heart Failure sites:   https://[employer].iHydroRun/ --- this is American Heart Association interactive Healthier Living with Heart Failure guidebook. Please copy and paste link into search bar. Use your mouse to scroll through the pages. Lots and lots of info / tips    [facility] [place] joselin  --- free smart phone joselin available for [employer] and [employer]. Use your phone to track sodium / fluid intake,  symptoms, weight, etc.    [employer]. [employer] - website-- [employer] is a dialysis company. All dialysis patients follow a renal diet which IS low sodium!! This website offers free seasonal cookbooks. Each quarter, they will release 25-30 new recipes with a breakdown of calories, sodium, glucose, etc    www.eatingwell.com/recipes -- more free recipes      Patient instructions for CAM monitor: You will need to wear monitor for 2 weeks. Mail monitor back or return to office on following date. Remove date:  12-20      [address]  [address]  [address]         Make sure to save box as you will place monitor back in postage paid box to return to company. After removing monitor stick it to template provided, place both your log and monitor in box and place in mailbox. If monitor comes off but has been in place at least 7 days place in box and mail back. If it comes off sooner than 7 days you will have to call office and return to have it replaced. Avoid excess sweating to maximize wear time.          You are able to shower after 24 hours, however have majority of water hitting back and not directly on monitor. Do not submerge in bath. If you experience any symptoms while wearing monitor push button and record in booklet.       For questions about monitor call: Customer Service [phone]

## 2022-12-02 NOTE — H&P
Internal Medicine PGY- 3 Resident History & Physical      PCP: Rachele Song MD    Date of Admission: 12/1/2022    Chief Complaint:  SOB    History Of Present Illness:      La Alvarenga is a 80 y.o. male w/ PMHX of multiple chronic comorbidities including DM2, AVEL, HFpEF, CKD, HTN, HLD, GERD, obesity listed below who presents with a chief complaint of shortness of breath. On chart review it appears family felt patient was grunting and having what appeared to be abdominal pain and wanted him to be evaluated. On arrival EMS noted patient with sats 80% on room air and was placed on 6 L nasal cannula. Patient is apparently hard of hearing at baseline and unfortunately did not bring his hearing aids. In the ED vitals noted /107, heart rate 94, respirations 20, temp 98.8, O2 93% on 6 L nasal cannula. CBC noted leukocytosis with 14.1 with neutrophil predominance and low absolute lymphocyte count at 0.7, stable normocytic anemia at 11.1 g/dL and normal platelet count. BMP remarkable for BUN 31, creatinine 2.1 (baseline~1.7). Lactic acid 1.5. Troponin 0.04. EKG without any obvious ischemic changes. LFTs normal.  VBG with pH 7.30, PCO2 64.3, bicarb 31.6. CXR with bibasilar airspace disease with small bilateral pleural effusions. Head CT is pending at this time of evaluation. He was started on Rocephin and azithromycin in the ED. Unfortunately patient is not able to give much information in the ED due to not having his hearing aids. It is unclear if he is altered from his baseline or if it is due to him not being able to hear appropriately. I attempted to reach out to family for collateral information including his son Jeanmarie Rosenberg (called multiple times without answering) and his step daughter Orly Machado ( wrong number).      Past Medical History:        Diagnosis Date    Anemia 4/9/2012    Antineutrophil cytoplasmic antibody (ANCA) positive 8/21/2017    Atelectasis of left lung 2/18/2016    Bell's palsy 7/24/2015 Left sided - July 2015    Benign non-nodular prostatic hyperplasia with lower urinary tract symptoms 11/3/2015    BPH (benign prostatic hypertrophy) 7/5/2011    Chronic gout without tophus 11/3/2015    Chronic kidney disease, stage III (moderate) (HCC)     Chronic pain of both knees 11/8/2016    Coronary artery calcification seen on CT scan 2/18/2016    Diabetes mellitus, type 2 (HCC)     Diastolic dysfunction 7/1/9940    Dyspnea on exertion 2/7/2014    Edema 1/8/2013    Elevated PSA 5/7/2015    Essential hypertension 11/3/2015    Gastroesophageal reflux disease without esophagitis 11/3/2015    GERD (gastroesophageal reflux disease) 7/5/2011    Gout 9/22/2011    Hepatic cyst 2/18/2016    Hypercalcemia 8/24/2017    Hyperlipidemia     Hypertension     Insomnia 6/29/2010    Localized edema 9/20/2016    Morbid obesity due to excess calories ([address] 11/8/2016    Multiple thyroid nodules 2/18/2016    Nocturia 5/7/2015    Obesity     Obstructive sleep apnea     Primary osteoarthritis of left knee 11/8/2016    Primary osteoarthritis of right knee 11/8/2016    Proteinuria 9/27/2010    Pulmonary emphysema ([address] 2/8/2017    Pulmonary nodules 2/4/2016    Shortness of breath 2/4/2016    Type 2 diabetes mellitus with diabetic chronic kidney disease ([address] 11/3/2015    Type 2 diabetes mellitus with stage 3 chronic kidney disease ([address] 2/4/2016    Type 2 diabetes mellitus with stage 3 chronic kidney disease, without long-term current use of insulin ([address] 8/5/2016    Vitamin D deficiency 8/21/2017       Past Surgical History:          Procedure Laterality Date    APPENDECTOMY      COLONOSCOPY      Dr. Kelly Petit FLX DX W/COLLAUBREE Stone 1978 PFRMD N/A 11/6/2018    COLONOSCOPY WITH MAC performed by Stella Quintana MD at [address] [facility]      left sided - August 2000    TURP      August 3082    UMBILICAL HERNIA REPAIR      August 2000    UPPER GASTROINTESTINAL ENDOSCOPY N/A 11/6/2018 EGD BIOPSY Forcep biopsy of gastric R/O H. Pylori performed by Dewey De La Torre MD at [place] [address]       Medications Prior to Admission:      Prior to Admission medications    Medication Sig Start Date End Date Taking? Authorizing Provider   simvastatin (ZOCOR) 20 MG tablet TAKE ONE TABLET BY MOUTH ONCE NIGHTLY 11/7/22   Ame Belcher MD   levothyroxine (SYNTHROID) 25 MCG tablet Take 1 tablet by mouth daily 9/27/22   Marichuy aLnce MD   ACCU-CHEK MEAGAN PLUS strip CHECK BLOOD SUGARS THREE TIMES A DAY BEFORE A MEAL 8/20/22   Marichuy Lance MD   torsemide (DEMADEX) 100 MG tablet Take 1 tablet (100 mg) in the morning and 1/2 tablet (50 mg) in the late afternoon 12/21/21   Ame Belcher MD   tamsulosin [facility]) 0.4 MG capsule Take 1 capsule by mouth daily 12/21/21   Ame Belcher MD   carvedilol (COREG) 25 MG tablet Take 1 tablet by mouth 2 times daily 12/21/21   Ame Belcher MD   Accu-Chek Softclix Lancets MISC Check 3 times day before meals 9/2/21   Marichuy Lance MD   allopurinol (ZYLOPRIM) 100 MG tablet TAKE TWO TABLETS BY MOUTH DAILY 5/26/21   Merary Bradford MD       Allergies:  Patient has no known allergies. Social History:      The patient currently lives at home    TOBACCO:   reports that he has quit smoking. His smoking use included cigarettes. He has never used smokeless tobacco.  ETOH:   reports no history of alcohol use. History:          Problem Relation Age of Onset    Cancer Sister         throat    Substance Abuse Sister         smoker    Heart Disease Mother     Heart Attack Mother     Stroke Brother     Seizures Brother        REVIEW OF SYSTEMS:   Pertinentpositives as noted in the HPI. All other systems reviewed and negative. 10 point ROS negative except as listed above.     PHYSICALEXAM PERFORMED:    BP (!) 164/107   Pulse 94   Temp 98.8 °F (37.1 °C) (Oral)   Resp 20   Wt 270 lb 3.2 oz (122.6 kg)   SpO2 93%   BMI 43.61 kg/m²     General appearance:  Mild distress with atypical breathing pattern and grunting. HEENT:  Normal cephalic, atraumaticwithout obvious deformity. Pupils equal, round, and reactive to light. Extra ocular muscles intact. Respiratory:  Bilateral rhonchorous BS. Slight increased work of breathing and occasional grunting. Cardiovascular:  Regular rate and rhythm with normal S1/S2 without murmurs, rubs or gallops. Abdomen: Obese, Soft,non-tender, non-distended normal bowel sounds. Musculoskeletal:  Bilateral lower extremity venous stasis and some edema. L>R. Could not assess strength but appears to move extremities without difficulty. Neurologic: Neurovascularly intact without any focalsensory/motor deficits. Cranial nerves: II-XII intact, grossly non-focal.  Psychiatric:  Could not assess orientation but is alert. Peripheral Pulses: +2 palpable, equal bilaterally     Labs:     Recent Labs     12/01/22 2223   WBC 14.1*   HGB 11.1*   HCT 34.8*        Recent Labs     12/01/22 2223      K 4.8      CO2 29   BUN 31*   CREATININE 2.1*   CALCIUM 10.4     Recent Labs     12/01/22  2223   AST 12*   ALT <5*   BILIDIR <0.2   BILITOT 0.8   ALKPHOS 64     No results for input(s): INR in the last 72 hours. Recent Labs     12/01/22 2223   TROPONINI 0.04*       Urinalysis:   Lab Results   Component Value Date/Time    NITRU Negative 12/01/2021 11:55 AM    WBCUA 50 12/01/2021 11:55 AM    BACTERIA 1+ 12/01/2021 11:55 AM    RBCUA 3 12/01/2021 11:55 AM    BLOODU TRACE 12/01/2021 11:55 AM    SPECGRAV 1.011 12/01/2021 11:55 AM    GLUCOSEU Negative 12/01/2021 11:55 AM    GLUCOSEU NEGATIVE 01/06/2012 02:50 PM       Radiology:     CXR: I have reviewed the CXR with the following interpretation:   EKG:  I have reviewed the EKG with the following interpretation:     XR CHEST PORTABLE   Final Result      1. Bibasilar airspace disease with small bilateral pleural effusions.       CT HEAD WO CONTRAST    (Results Pending)       ASSESSMENT & PLAN:  Radha Harris is a 80 y.o. male w/ multiple chronic co morbidities p/w hypoxia and questionable AMS. Dx CAP. Treatment with IVF, Abx, O2    Acute hypoxic hypercapnic respiratory failure  Suspected CAP  Not on O2 at home. - CXR with bibasilar airspace disease. VBG with pH 7.3, pCO2 64.  - will continue Rocephin + azithromycin  - infectious workup: strep PNA Ag, legionella, COVID, Influenza, blood Cx, UA w/ reflex  - Continue supp O2 to maintain O2 >92%  - check VBG in AM  - Duonebs  - gentle IVF    Questionable AMS  - Head CT pending  - will check B12, TSH, UA    MARY ANN on CKD  - Cre 2.1 (Baseline ~1.6)  - check Ucre, Claire to calculate FeNa  - gentle IVF    Troponinemia  - Trop 0.04. Will trend  - EKG with T wave inversions in inferior leads. 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 Hypothyroidism  - resume synthroid    GERD  - Protonix    HFpEF not in exacerbation  - resume carvedilol, torsemide  - I&O, Daily weights  - optimize lytes ( Mag >2, K >4)    HTN  - resume home BP regimen    HLD  - resume statin    DM2  - not on any medications. Last HgbA1c 7.2 ub 7/2022  - Hypoglycemia protocol  - will add LDSSI with Accuchecks      DVTProphylaxis: Heparin  Diet: NPO but sips with meds  Code Status: Full Code    I will discuss the patient with Dr. Wili Mccullough MD, MD  Internal Medicine Resident, PGY3  Contact via PerfectServe     Addendum to Resident H& P/Progress note:  I have personally seen,examined and evaluated the patient. I have reviewed the current history, physical findings, labs and assessment and plan and agree with note as documented by resident MD ( Rolando Locke)  In addition, I am worried that patient presentation and hypoxia are more secondary to congestive heart failure exacerbation. CT chest without IV contrast and echocardiogram will be ordered.     Hectro Forrest MD, FACP

## 2022-12-02 NOTE — ED TRIAGE NOTES
Pt comes to the ED from home for shortness of breath and potentially altered mental status. Pt is [place], but does not have his hearing aids in per EMS.

## 2022-12-02 NOTE — CARE COORDINATION
Case Management Assessment           Initial Evaluation                Date / Time of Evaluation: 12/2/2022 2:21 PM                 Assessment Completed by: Alli Ruff RN    Patient Name: Archana Wilkinson     YOB: 1934  Diagnosis: Hypoxia [R09.02]  Acute respiratory failure, unsp w hypoxia or hypercapnia ([facility] [J96.00]  Pneumonia due to infectious organism, unspecified laterality, unspecified part of lung [J18.9]     Date / Time: 12/1/2022  9:34 PM    Patient Admission Status: Inpatient    If patient is discharged prior to next notation, then this note serves as note for discharge by case management. Current PCP: Rosana Mora MD  Clinic Patient: No    Chart Reviewed: Yes  Patient/ Family Interviewed: Yes    Initial assessment completed at bedside with: chart review    Hospitalization in the last 30 days: No    Emergency Contacts:  Extended Emergency Contact Information  Primary Emergency Contact: [address] Phone: [phone]  Relation: Child  Secondary Emergency Contact: Rachel Bello  Address: [address] [place] [address].  [facility], Effie James [address] Phone: [phone]  Mobile Phone: [phone]  Relation: Step Child  Preferred language: English    Advance Directives:   Code Status: Full Code      Financial  [facility] [address] / Plan: Hortencia Bolaños / Product Type: *No Product type* /     Pre-cert required for SNF: Yes    Pharmacy    [facility] [id number] [place] [address]  Phone: [phone] Fax: [phone]    [facility] ([facility] [facility]) - [address] [facility] [phone] - F [phone]  [address] [place] [id number]  Phone: [phone] Fax: [phone]      Potential assistance Purchasing Medications:    Does Patient want to participate in local refill/ meds to beds program?:      Meds To Beds General Rules:  1. Can ONLY be done Monday- Friday between 8:30am-5pm  2. Prescription(s) must be in pharmacy by 3pm to be filled same day  3. Copy of patient's insurance/ prescription drug card and patient face sheet must be sent along with the prescription(s)  4. Cost of Rx cannot be added to hospital bill. If financial assistance is needed, please contact unit  or ;  or  CANNOT provide pharmacy voucher for patients co-pays  5. Patients can then  the prescription on their way out of the hospital at discharge, or pharmacy can deliver to the bedside if staff is available. (payment due at time of pick-up or delivery - cash, check, or card accepted)     Able to afford home medications/ co-pay costs: Yes    ADLS  Support Systems:      PT AM-PAC: 11 /24  OT AM-PAC: 16 /24    [place]: lives alone  Steps: elevator-apartment     Plans to RETURN to current housing: Yes  Barriers to RETURNING to current housing: none    Home Care Information  Currently ACTIVE with [address]: No  Durable Medical Equipment  DME Provider: n/a  Equipment: wheelchair- not ambulatory for 6 years  Bathroom Accessibility: Wheelchair accessible  Home Equipment: [employer]  Has the patient had two or more falls in the past year or any fall with injury in the past year?: Yes (2 falls last six months)  ADL Assistance: Needs assistance (daughter assists with bathing, pt does not wear footwear)  Homemaking Assistance: Needs assistance (daughter assists with cleaning)  Ambulation Assistance:  (reports has not walked in 6 years. Uses w/c)  Transfer Assistance: Independent (pivots to w/c)    Additional Comments: Above information  mildly difficult to obtain 2/2 [place]. Most information obtained via written communication.          DISCHARGE PLAN:  Disposition: Home with Home Health Care: [facility] following Transportation PLAN for discharge: family     Factors facilitating achievement of predicted outcomes: Family support and Caregiver support    Barriers to discharge: Medical complications    Additional Case Management Notes: Patient lives in apartment with elevator. He has been w/c bound for approx 6 years. Daughter takes care of needs at his home. He can pivot to w/c. He is very [facility] and can answer questions by writing of questioner. The Plan for Transition of Care is related to the following treatment goals of Hypoxia [R09.02]  Acute respiratory failure, unsp w hypoxia or hypercapnia ([address] [J96.00]  Pneumonia due to infectious organism, unspecified laterality, unspecified part of lung [J18.9]    The Patient and/or patient representative Emani Gonzalez and his family were provided with a choice of provider and agrees with the discharge plan Yes    Freedom of choice list was provided with basic dialogue that supports the patient's individualized plan of care/goals and shares the quality data associated with the providers.  Yes    Care Transition patient: No    Shamika Garcia RN  [facility]  Case Management Department  Ph: [phone]

## 2022-12-02 NOTE — SIGNIFICANT EVENT
VASYL DAVE was called and when we arrived to the room the pt had a pulse. Pt ABG was taken, which was unchanged from previous. At baseline the patient is hard of hearing and he is difficult to move. The nurse stated that she was just moving him from the chair to bed when he lost consciousness. Vitals were normal and the pt was saturating well on O2. After a few minutes the pt came back to. This is most likely a vasovagal syncope. Compression socks have been applied and blood pressure will be closely monitored.

## 2022-12-02 NOTE — ED PROVIDER NOTES
[address] [facility]          ATTENDING PHYSICIAN NOTE       Date of evaluation: 12/1/2022    Chief Complaint     Shortness of Breath (Sats 80's on RA per EMS // placed onto 6L NC), Altered Mental Status, and Fatigue      History of Present Illness     Aldo Moore is a 80 y.o. male who presents with a chief complaint of shortness of breath. According to EMS patient had sats of 80% on room air, was placed on 6 L nasal cannula. Family thinks that the patient was grunting and was having abdominal pain and sent him in to be evaluated. They are not present at bedside to provide additional history. When I try and talk to the patient he waved at me and states \"thank you so much \". Unable to answer any questions. Unclear if this is his baseline mental status. Patient is not usually in the emergency department. Review of Systems     Review of Systems unable to obtain review of systems due to patient's mental status.     Past Medical, Surgical, Family, and Social History     He has a past medical history of Anemia, Antineutrophil cytoplasmic antibody (ANCA) positive, Atelectasis of left lung, Bell's palsy, Benign non-nodular prostatic hyperplasia with lower urinary tract symptoms, BPH (benign prostatic hypertrophy), Chronic gout without tophus, Chronic kidney disease, stage III (moderate) (HCC), Chronic pain of both knees, Coronary artery calcification seen on CT scan, Diabetes mellitus, type 2 ([address] Diastolic dysfunction, Dyspnea on exertion, Edema, Elevated PSA, Essential hypertension, Gastroesophageal reflux disease without esophagitis, GERD (gastroesophageal reflux disease), Gout, Hepatic cyst, Hypercalcemia, Hyperlipidemia, Hypertension, Insomnia, Localized edema, Morbid obesity due to excess calories ([address] Multiple thyroid nodules, Nocturia, Obesity, Obstructive sleep apnea, Primary osteoarthritis of left knee, Primary osteoarthritis of right knee, Proteinuria, Pulmonary emphysema ([address] Pulmonary nodules, Shortness of breath, Type 2 diabetes mellitus with diabetic chronic kidney disease ([facility] Type 2 diabetes mellitus with stage 3 chronic kidney disease ([facility] Type 2 diabetes mellitus with stage 3 chronic kidney disease, without long-term current use of insulin ([facility] and Vitamin D deficiency. He has a past surgical history that includes Colonoscopy; Appendectomy; Umbilical hernia repair; TURP; cyst removal; total nephrectomy; pr colonoscopy flx dx w/collj spec when pfrmd (N/A, 11/6/2018); and Upper gastrointestinal endoscopy (N/A, 11/6/2018). His family history includes Cancer in his sister; Heart Attack in his mother; Heart Disease in his mother; Seizures in his brother; Stroke in his brother; Substance Abuse in his sister. He reports that he has quit smoking. His smoking use included cigarettes. He has never used smokeless tobacco. He reports that he does not drink alcohol and does not use drugs. Medications     Previous Medications    ACCU-CHEK MEAGAN PLUS STRIP    CHECK BLOOD SUGARS THREE TIMES A DAY BEFORE A MEAL    ACCU-CHEK SOFTCLIX LANCETS MISC    Check 3 times day before meals    ALLOPURINOL (ZYLOPRIM) 100 MG TABLET    TAKE TWO TABLETS BY MOUTH DAILY    CARVEDILOL (COREG) 25 MG TABLET    Take 1 tablet by mouth 2 times daily    LEVOTHYROXINE (SYNTHROID) 25 MCG TABLET    Take 1 tablet by mouth daily    SIMVASTATIN (ZOCOR) 20 MG TABLET    TAKE ONE TABLET BY MOUTH ONCE NIGHTLY    TAMSULOSIN (FLOMAX) 0.4 MG CAPSULE    Take 1 capsule by mouth daily    TORSEMIDE (DEMADEX) 100 MG TABLET    Take 1 tablet (100 mg) in the morning and 1/2 tablet (50 mg) in the late afternoon       Allergies     He has No Known Allergies. Physical Exam     INITIAL VITALS: BP: (!) 164/107, Temp: 98.8 °F (37.1 °C), Heart Rate: 94, Resp: 20, SpO2: 93 %   Physical Exam  Constitutional:       General: He is in acute distress. Appearance: He is well-developed. He is ill-appearing.  He is not diaphoretic. HENT:      Head: Normocephalic and atraumatic. Mouth/Throat:      Pharynx: No oropharyngeal exudate. Eyes:      General:         Right eye: No discharge. Left eye: No discharge. Conjunctiva/sclera: Conjunctivae normal.      Pupils: Pupils are equal, round, and reactive to light. Neck:      Thyroid: No thyromegaly. Vascular: No JVD. Trachea: No tracheal deviation. Cardiovascular:      Rate and Rhythm: Normal rate and regular rhythm. Heart sounds: Normal heart sounds. No murmur heard. No friction rub. No gallop. Pulmonary:      Effort: Tachypnea, accessory muscle usage and respiratory distress present. Breath sounds: No stridor. Rhonchi and rales present. No decreased breath sounds or wheezing. Chest:      Chest wall: No tenderness. Abdominal:      General: Bowel sounds are normal. There is no distension. Palpations: Abdomen is soft. Tenderness: There is no abdominal tenderness. There is no guarding or rebound. Musculoskeletal:         General: No tenderness or deformity. Normal range of motion. Cervical back: Normal range of motion and neck supple. Right lower leg: Edema present. Left lower leg: Edema present. Skin:     General: Skin is warm and dry. Capillary Refill: Capillary refill takes less than 2 seconds. Findings: No erythema or rash. Neurological:      Mental Status: He is alert. Cranial Nerves: No cranial nerve deficit. Motor: No weakness or abnormal muscle tone. Coordination: Coordination normal.      Comments: Seems confused, cannot follow commands, but moving all extremities equally and spontaneously   Psychiatric:         Behavior: Behavior normal.       Diagnostic Results     EKG   none    RADIOLOGY:  XR CHEST PORTABLE    (Results Pending)   CT HEAD WO CONTRAST    (Results Pending)       LABS:   No results found for this visit on 12/01/22.     ED BEDSIDE ULTRASOUND:  No results found.    RECENT VITALS:  BP: (!) 164/107,Temp: 98.8 °F (37.1 °C), Heart Rate: 94, Resp: 20, SpO2: 93 %     Procedures     none    ED Course     Nursing Notes, Past Medical Hx, Past Surgical Hx, Social Hx,Allergies, and Family Hx were reviewed. patient was given the following medications:  No orders of the defined types were placed in this encounter. CONSULTS:  None    MEDICAL DECISIONMAKING / ASSESSMENT / PLAN     Candelario Lechuga is a 80 y.o. male who presents with a chief complaint of shortness of breath. Found to be hypoxic here to the low 90s, continued on oxygen. Patient on arrival does appear to be in some respiratory distress, moving around on the bed, seems to not be able to get comfortable, continually sitting up in bed, does not want to lay flat. Unable to provide any meaningful history. Patient otherwise was noted to have normal vitals. Broad work-up initiated. Oncoming provider to follow-up on work-up including EKG. Anticipate admission given hypoxia. .    Clinical Impression     Shortness of breath  Hypoxia    Disposition     PATIENT REFERRED TO:  No follow-up provider specified.     DISCHARGE MEDICATIONS:  New Prescriptions    No medications on file       DISPOSITION  - Pending reassessment         Blair Styles MD  12/01/22 0968

## 2022-12-02 NOTE — PROGRESS NOTES
Pt transferred from chair to bed utilizing reggie roa with 2 RNs. Pt became diaphoretic and unresponsive to sternal rub shortly after transfer. Pt HR mone down to 37 on telemetry. Rapid response called at 1400. Changed to Code Blue as pt HR decreased from 75 to 37.     1404: Pacer pads placed on pt. . /74. HR 35. 87% on 15L.    1405: Pt placed on non-re breather. 1406: /61     1407: ABG obtained. 96% on non-re breather    1409: /67. HR 71. 100%    1410: /70     1411: Pt becoming more alert but still lethargic.  Pt sent down for CT head and chest.     Electronically signed by Óscar Albrecht RN on 12/2/2022 at 3:27 PM

## 2022-12-03 PROBLEM — R55 SYNCOPE AND COLLAPSE: Status: ACTIVE | Noted: 2022-12-03

## 2022-12-03 PROBLEM — I49.8 JUNCTIONAL RHYTHM: Status: ACTIVE | Noted: 2022-12-03

## 2022-12-03 LAB
ANION GAP SERPL CALCULATED.3IONS-SCNC: 9 MMOL/L (ref 3–16)
BACTERIA: ABNORMAL /HPF
BASOPHILS ABSOLUTE: 0 K/UL (ref 0–0.2)
BASOPHILS RELATIVE PERCENT: 0.3 %
BILIRUBIN URINE: NEGATIVE
BLOOD, URINE: ABNORMAL
BUN BLDV-MCNC: 40 MG/DL (ref 7–20)
CALCIUM SERPL-MCNC: 9.6 MG/DL (ref 8.3–10.6)
CHLORIDE BLD-SCNC: 104 MMOL/L (ref 99–110)
CLARITY: CLEAR
CO2: 27 MMOL/L (ref 21–32)
COLOR: YELLOW
CREAT SERPL-MCNC: 2.1 MG/DL (ref 0.8–1.3)
EOSINOPHILS ABSOLUTE: 0 K/UL (ref 0–0.6)
EOSINOPHILS RELATIVE PERCENT: 0.3 %
EPITHELIAL CELLS, UA: ABNORMAL /HPF (ref 0–5)
GFR SERPL CREATININE-BSD FRML MDRD: 30 ML/MIN/{1.73_M2}
GLUCOSE BLD-MCNC: 115 MG/DL (ref 70–99)
GLUCOSE BLD-MCNC: 118 MG/DL (ref 70–99)
GLUCOSE BLD-MCNC: 145 MG/DL (ref 70–99)
GLUCOSE BLD-MCNC: 151 MG/DL (ref 70–99)
GLUCOSE BLD-MCNC: 164 MG/DL (ref 70–99)
GLUCOSE URINE: NEGATIVE MG/DL
HCT VFR BLD CALC: 32.5 % (ref 40.5–52.5)
HEMOGLOBIN: 10.2 G/DL (ref 13.5–17.5)
KETONES, URINE: NEGATIVE MG/DL
LEUKOCYTE ESTERASE, URINE: ABNORMAL
LYMPHOCYTES ABSOLUTE: 0.9 K/UL (ref 1–5.1)
LYMPHOCYTES RELATIVE PERCENT: 7.8 %
MAGNESIUM: 2.5 MG/DL (ref 1.8–2.4)
MCH RBC QN AUTO: 27.2 PG (ref 26–34)
MCHC RBC AUTO-ENTMCNC: 31.4 G/DL (ref 31–36)
MCV RBC AUTO: 86.8 FL (ref 80–100)
MICROSCOPIC EXAMINATION: YES
MONOCYTES ABSOLUTE: 0.8 K/UL (ref 0–1.3)
MONOCYTES RELATIVE PERCENT: 7.6 %
NEUTROPHILS ABSOLUTE: 9.3 K/UL (ref 1.7–7.7)
NEUTROPHILS RELATIVE PERCENT: 84 %
NITRITE, URINE: NEGATIVE
PDW BLD-RTO: 16 % (ref 12.4–15.4)
PERFORMED ON: ABNORMAL
PH UA: 5.5 (ref 5–8)
PLATELET # BLD: 143 K/UL (ref 135–450)
PMV BLD AUTO: 8.4 FL (ref 5–10.5)
POTASSIUM REFLEX MAGNESIUM: 4.3 MMOL/L (ref 3.5–5.1)
PRO-BNP: ABNORMAL PG/ML (ref 0–449)
PROTEIN UA: 30 MG/DL
RBC # BLD: 3.74 M/UL (ref 4.2–5.9)
RBC UA: ABNORMAL /HPF (ref 0–4)
RENAL EPITHELIAL, UA: ABNORMAL /HPF (ref 0–1)
SODIUM BLD-SCNC: 140 MMOL/L (ref 136–145)
SPECIFIC GRAVITY UA: 1.01 (ref 1–1.03)
TROPONIN: 0.04 NG/ML
TROPONIN: 0.04 NG/ML
URINE REFLEX TO CULTURE: YES
URINE TYPE: ABNORMAL
UROBILINOGEN, URINE: 0.2 E.U./DL
WBC # BLD: 11.1 K/UL (ref 4–11)
WBC UA: ABNORMAL /HPF (ref 0–5)

## 2022-12-03 PROCEDURE — 94761 N-INVAS EAR/PLS OXIMETRY MLT: CPT

## 2022-12-03 PROCEDURE — 80048 BASIC METABOLIC PNL TOTAL CA: CPT

## 2022-12-03 PROCEDURE — 99233 SBSQ HOSP IP/OBS HIGH 50: CPT | Performed by: NURSE PRACTITIONER

## 2022-12-03 PROCEDURE — 81001 URINALYSIS AUTO W/SCOPE: CPT

## 2022-12-03 PROCEDURE — 2580000003 HC RX 258: Performed by: PHYSICIAN ASSISTANT

## 2022-12-03 PROCEDURE — 6370000000 HC RX 637 (ALT 250 FOR IP): Performed by: PHYSICIAN ASSISTANT

## 2022-12-03 PROCEDURE — 94640 AIRWAY INHALATION TREATMENT: CPT

## 2022-12-03 PROCEDURE — 84484 ASSAY OF TROPONIN QUANT: CPT

## 2022-12-03 PROCEDURE — 6360000002 HC RX W HCPCS: Performed by: INTERNAL MEDICINE

## 2022-12-03 PROCEDURE — 83880 ASSAY OF NATRIURETIC PEPTIDE: CPT

## 2022-12-03 PROCEDURE — 2700000000 HC OXYGEN THERAPY PER DAY

## 2022-12-03 PROCEDURE — 2060000000 HC ICU INTERMEDIATE R&B

## 2022-12-03 PROCEDURE — 99232 SBSQ HOSP IP/OBS MODERATE 35: CPT | Performed by: HOSPITALIST

## 2022-12-03 PROCEDURE — 85025 COMPLETE CBC W/AUTO DIFF WBC: CPT

## 2022-12-03 PROCEDURE — 6360000002 HC RX W HCPCS: Performed by: PHYSICIAN ASSISTANT

## 2022-12-03 PROCEDURE — 6370000000 HC RX 637 (ALT 250 FOR IP): Performed by: HOSPITALIST

## 2022-12-03 PROCEDURE — 83735 ASSAY OF MAGNESIUM: CPT

## 2022-12-03 PROCEDURE — 36415 COLL VENOUS BLD VENIPUNCTURE: CPT

## 2022-12-03 RX ORDER — IPRATROPIUM BROMIDE AND ALBUTEROL SULFATE 2.5; .5 MG/3ML; MG/3ML
1 SOLUTION RESPIRATORY (INHALATION) 2 TIMES DAILY
Status: DISCONTINUED | OUTPATIENT
Start: 2022-12-03 | End: 2022-12-07 | Stop reason: HOSPADM

## 2022-12-03 RX ADMIN — LEVOTHYROXINE SODIUM 25 MCG: 0.03 TABLET ORAL at 09:23

## 2022-12-03 RX ADMIN — HEPARIN SODIUM 5000 UNITS: 5000 INJECTION INTRAVENOUS; SUBCUTANEOUS at 21:01

## 2022-12-03 RX ADMIN — CEFEPIME 1000 MG: 1 INJECTION, POWDER, FOR SOLUTION INTRAMUSCULAR; INTRAVENOUS at 23:48

## 2022-12-03 RX ADMIN — SODIUM CHLORIDE, PRESERVATIVE FREE 10 ML: 5 INJECTION INTRAVENOUS at 09:36

## 2022-12-03 RX ADMIN — IPRATROPIUM BROMIDE AND ALBUTEROL SULFATE 1 AMPULE: 2.5; .5 SOLUTION RESPIRATORY (INHALATION) at 20:53

## 2022-12-03 RX ADMIN — SODIUM CHLORIDE, PRESERVATIVE FREE 10 ML: 5 INJECTION INTRAVENOUS at 21:21

## 2022-12-03 RX ADMIN — HEPARIN SODIUM 5000 UNITS: 5000 INJECTION INTRAVENOUS; SUBCUTANEOUS at 14:20

## 2022-12-03 RX ADMIN — ALLOPURINOL 200 MG: 100 TABLET ORAL at 09:23

## 2022-12-03 RX ADMIN — FUROSEMIDE 60 MG: 10 INJECTION, SOLUTION INTRAMUSCULAR; INTRAVENOUS at 18:42

## 2022-12-03 RX ADMIN — IPRATROPIUM BROMIDE AND ALBUTEROL SULFATE 1 AMPULE: 2.5; .5 SOLUTION RESPIRATORY (INHALATION) at 08:46

## 2022-12-03 RX ADMIN — AZITHROMYCIN MONOHYDRATE 500 MG: 250 TABLET ORAL at 00:15

## 2022-12-03 RX ADMIN — CEFEPIME 1000 MG: 1 INJECTION, POWDER, FOR SOLUTION INTRAMUSCULAR; INTRAVENOUS at 12:41

## 2022-12-03 RX ADMIN — AZITHROMYCIN MONOHYDRATE 500 MG: 250 TABLET ORAL at 23:52

## 2022-12-03 RX ADMIN — HEPARIN SODIUM 5000 UNITS: 5000 INJECTION INTRAVENOUS; SUBCUTANEOUS at 05:57

## 2022-12-03 RX ADMIN — FUROSEMIDE 60 MG: 10 INJECTION, SOLUTION INTRAMUSCULAR; INTRAVENOUS at 09:24

## 2022-12-03 RX ADMIN — CEFEPIME 2000 MG: 2 INJECTION, POWDER, FOR SOLUTION INTRAMUSCULAR; INTRAVENOUS at 00:14

## 2022-12-03 RX ADMIN — TAMSULOSIN HYDROCHLORIDE 0.4 MG: 0.4 CAPSULE ORAL at 09:23

## 2022-12-03 RX ADMIN — CARVEDILOL 25 MG: 25 TABLET, FILM COATED ORAL at 21:01

## 2022-12-03 RX ADMIN — CARVEDILOL 25 MG: 25 TABLET, FILM COATED ORAL at 09:35

## 2022-12-03 RX ADMIN — ATORVASTATIN CALCIUM 10 MG: 20 TABLET, FILM COATED ORAL at 09:23

## 2022-12-03 NOTE — PROGRESS NOTES
Progress Note  PGY-3    CC: SOB  Patient's PCP: Jaylyn Higgins MD    Interval History   Overnight blood cultures grew Enterobacter and patient's ceftriaxone was changed to cefepime. No other acute events overnight. Patient was seen at bedside this morning. Patient is very hard of hearing and I had to communicate with him by writing notes. His hearing aids are not at the hospital.  Attempted calling Haresh and Nehawka Cooper (per Epic) but with no response. Patient is feeling good today and denied chest pain, shortness of breath, palpitations, nausea and vomiting. Patient was on 4 L O2 NC saturating 98%. MEDICATIONS:     No current facility-administered medications on file prior to encounter.      Current Outpatient Medications on File Prior to Encounter   Medication Sig Dispense Refill    simvastatin (ZOCOR) 20 MG tablet TAKE ONE TABLET BY MOUTH ONCE NIGHTLY 90 tablet 1    levothyroxine (SYNTHROID) 25 MCG tablet Take 1 tablet by mouth daily 90 tablet 1    ACCU-CHEK MEAGAN PLUS strip CHECK BLOOD SUGARS THREE TIMES A DAY BEFORE A MEAL 100 strip 1    torsemide (DEMADEX) 100 MG tablet Take 1 tablet (100 mg) in the morning and 1/2 tablet (50 mg) in the late afternoon 135 tablet 3    tamsulosin (FLOMAX) 0.4 MG capsule Take 1 capsule by mouth daily 90 capsule 3    carvedilol (COREG) 25 MG tablet Take 1 tablet by mouth 2 times daily 180 tablet 3    Accu-Chek Softclix Lancets MISC Check 3 times day before meals 100 each 3    allopurinol (ZYLOPRIM) 100 MG tablet TAKE TWO TABLETS BY MOUTH DAILY 180 tablet 3         Scheduled Meds:   ipratropium-albuterol  1 ampule Inhalation BID    allopurinol  200 mg Oral Daily    carvedilol  25 mg Oral BID    levothyroxine  25 mcg Oral Daily    atorvastatin  10 mg Oral Daily    tamsulosin  0.4 mg Oral Daily    sodium chloride flush  5-40 mL IntraVENous 2 times per day    azithromycin  500 mg Oral Q24H    heparin (porcine)  5,000 Units SubCUTAneous 3 times per day    insulin lispro  0-4 Units SubCUTAneous TID WC    insulin lispro  0-4 Units SubCUTAneous Nightly    furosemide  60 mg IntraVENous BID    cefepime  1,000 mg IntraVENous Q12H      Continuous Infusions:   sodium chloride      dextrose       PRN Meds:sodium chloride flush, sodium chloride, ondansetron **OR** ondansetron, polyethylene glycol, acetaminophen **OR** acetaminophen, glucose, dextrose bolus **OR** dextrose bolus, glucagon (rDNA), dextrose, albuterol    Allergies: No Known Allergies    PHYSICAL EXAM:       Vitals: /67   Pulse 68   Temp 97.3 °F (36.3 °C) (Oral)   Resp 18   Ht 5' 6\" (1.676 m)   Wt 261 lb 0.4 oz (118.4 kg)   SpO2 98%   BMI 42.13 kg/m²     I/O:    Intake/Output Summary (Last 24 hours) at 12/3/2022 1003  Last data filed at 12/3/2022 0831  Gross per 24 hour   Intake 300 ml   Output 800 ml   Net -500 ml     I/O this shift:  In: 240 [P.O.:240]  Out: 200 [Urine:200]  I/O last 3 completed shifts: In: 61 [P.O.:60]  Out: 800 [Urine:800]    Physical Examination:   General appearance: alert, appears stated age and cooperative. Skin: Skin color, texture, turgor normal.   HEENT: Head: Normocephalic, no lesions, without obvious abnormality. Neck: no adenopathy. Lungs: b/l expiratory wheezes. Heart: regular rate and rhythm, S1, S2 normal, no murmur, click, rub or gallop  Abdomen: soft, non-tender; bowel sounds normal; no masses,  no organomegaly  Extremities: b/l LE swelling. Neurologic: Mental status: Alert, oriented, thought content appropriate.     DATA:       Labs:  CBC:   Recent Labs     12/01/22 2223 12/03/22  0541   WBC 14.1* 11.1*   HGB 11.1* 10.2*   HCT 34.8* 32.5*    143       BMP:   Recent Labs     12/01/22 2223 12/02/22  0504 12/03/22  0541    142 140   K 4.8 4.4 4.3    105 104   CO2 29 26 27   BUN 31* 34* 40*   CREATININE 2.1* 2.2* 2.1*   GLUCOSE 141* 164* 118*     LFT's:   Recent Labs     12/01/22  2223   AST 12*   ALT <5*   BILITOT 0.8   ALKPHOS 64     Troponin:   Recent Labs 12/02/22  1800 12/03/22  0004 12/03/22  0541   TROPONINI 0.05* 0.04* 0.04*     BNP: No results for input(s): BNP in the last 72 hours. ABGs:   Recent Labs     12/02/22  1409   PHART 7.295*   DAY2QWR 52.2*   PO2ART 72.7*       Rad:  CT HEAD WO CONTRAST   Final Result      No acute intracranial process. Chronic small vessel ischemic changes. EXAM: CT CHEST WITHOUT CONTRAST      INDICATION: Shortness of breath      COMPARISON: February 18, 2016      TECHNIQUE: CT of the chest was performed without contrast. Axial images, multiplanar reformatted images and axial maximum intensity projection images provided for review. Individualized dose optimization technique was used in order to meet ALARA    standards for radiation dose reduction. In addition to vendor specific dose reduction algorithms, the dose reduction techniques vary based on the specific scanner utilized but frequently include automated exposure control, adjustment of the mA and/or kV    according to patient size, and use of iterative reconstruction technique. CONTRAST: None. FINDINGS:      LUNGS AND AIRWAYS: Frothy mucus within the lower trachea. Bilateral lower lobe atelectasis/consolidation. Milder atelectasis of the posterior upper lobes. Mild smooth interlobular septal thickening particularly in the lung apices consistent with a    component of interstitial edema. PLEURA: Trace bilateral pleural effusions. HEART AND GREAT VESSELS: Cardiomegaly. Extensive coronary artery calcification. Thoracic aorta and main pulmonary artery normal in caliber. No pericardial effusion. ADENOPATHY/MEDIASTINUM: Mildly prominent mediastinal lymph nodes, favored to be reactive in nature given the associated airspace disease. CHEST WALL / LOWER NECK: No significant abnormality. UPPER ABDOMEN: Large at least 13 mm cyst again within the visualized liver. Prior left nephrectomy with surgical clips.       BONES: No significant abnormality. IMPRESSION:      Bilateral lower lobe atelectasis/consolidation. Associated trace bilateral pleural effusions. Mild smooth interlobular septal thickening suggestive of mild volume overload/interstitial edema. Cardiomegaly. Extensive coronary artery calcification. Hepatic cyst.      CT CHEST WO CONTRAST   Final Result      No acute intracranial process. Chronic small vessel ischemic changes. EXAM: CT CHEST WITHOUT CONTRAST      INDICATION: Shortness of breath      COMPARISON: February 18, 2016      TECHNIQUE: CT of the chest was performed without contrast. Axial images, multiplanar reformatted images and axial maximum intensity projection images provided for review. Individualized dose optimization technique was used in order to meet ALARA    standards for radiation dose reduction. In addition to vendor specific dose reduction algorithms, the dose reduction techniques vary based on the specific scanner utilized but frequently include automated exposure control, adjustment of the mA and/or kV    according to patient size, and use of iterative reconstruction technique. CONTRAST: None. FINDINGS:      LUNGS AND AIRWAYS: Frothy mucus within the lower trachea. Bilateral lower lobe atelectasis/consolidation. Milder atelectasis of the posterior upper lobes. Mild smooth interlobular septal thickening particularly in the lung apices consistent with a    component of interstitial edema. PLEURA: Trace bilateral pleural effusions. HEART AND GREAT VESSELS: Cardiomegaly. Extensive coronary artery calcification. Thoracic aorta and main pulmonary artery normal in caliber. No pericardial effusion. ADENOPATHY/MEDIASTINUM: Mildly prominent mediastinal lymph nodes, favored to be reactive in nature given the associated airspace disease. CHEST WALL / LOWER NECK: No significant abnormality.       UPPER ABDOMEN: Large at least 13 mm cyst again within the visualized liver. Prior left nephrectomy with surgical clips. BONES: No significant abnormality. IMPRESSION:      Bilateral lower lobe atelectasis/consolidation. Associated trace bilateral pleural effusions. Mild smooth interlobular septal thickening suggestive of mild volume overload/interstitial edema. Cardiomegaly. Extensive coronary artery calcification. Hepatic cyst.      XR CHEST PORTABLE   Final Result      1. Bibasilar airspace disease with small bilateral pleural effusions. ASSESSMENT AND PLAN:   Ana Maria Saini is a 80 y.o. male w/ multiple chronic co morbidities p/w hypoxia and questionable AMS. Dx CAP. Treatment with IVF, Abx, O2     Acute hypoxic hypercapnic respiratory failure (improving)  HFpEF exacerbation  Suspected CAP   Not on O2 at home. CXR with bibasilar airspace disease. VBG with pH 7.33 (initially 7.3), pCO2 53.7 mmHg (initially 64 mmHg). Pro-calcitonin 41.16 ng/mL. COVID and Influenza negative  - Continue Cefepime + Azithromycin  - infectious workup pending: strep PNA Ag, legionella, blood Cx, UA w/ reflex  - Continue supp O2 to maintain O2 >92% and wean as possible  - Duonebs  - Lasix 60 mg BID IV  - Cardiology c/s, joselin recs  - Strict I&O, Daily weights  - optimize lytes ( Mag >2, K >4) and monitor with BMP    Enterobacter bacteremia  + on both Cxs.  - Stopped Rocephin (s/p 1 day. Started Cefepime (day 1)  - F/u sensitivities  - Called nurse to send a UA to r/u source     Questionable AMS (ruled out)  - Head CT with no acute abnormality   - Patient is hard-hearing and likely was perceived as having AMS. He might had also mild confusion due to the sepsis and acute process. He is currently A&O x4. MARY ANN on CKD  Might be related to CHF exacerbation.  - Cre 2.2 (Baseline ~1.6)  - As above     Troponinemia  - Trop 0.04(12/01/22), 0.05 (12/02/2022). Stopped trending- patient asymptomatic.   - EKG with T wave inversions in inferior leads.  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 Hypothyroidism  - Continue home synthroid 25 mcg daily     BPH  - Continue home Flomax 0.4 mg daily     HTN  - Continue home Coreg 25 mg BID     HLD  - Continue Atorvastatin 10 mg daily     DM2  - not on any medications. Last HgbA1c 7.2 ub 7/2022  - Hypoglycemia protocol  - LDSSI with Accuchecks        Code Status:Full Code  FEN: Low sodium diet  PPX: SQH  DISPO: F       This patient has been staffed and discussed with Fifi Vasquez MD.   -----------------------------  Melissa Borja MD, PGY-3  Internal Medicine     Addendum to Resident H& P/Progress note:  I have personally seen,examined and evaluated the patient.  I have reviewed the current history, physical findings, labs and assessment and plan and agree with note as documented by resident MD ( Kei Dixon)      Fifi Vasquez MD [facility]

## 2022-12-03 NOTE — PROGRESS NOTES
Physician Progress Note      PATIENT:               Eduardo Todd  CSN #:                  [id number]  :                       1934  ADMIT DATE:       2022 9:34 PM  [address] [place] DATE:  RESPONDING  PROVIDER #:        Brayan Rainey MD          QUERY TEXT:    Pt admitted with pneumonia. Pt noted to have leukocytosis, elevated   procalcitonin, MARY ANN, tachycardia, and tachypnea. If possible, please document   in the progress notes and discharge summary if you are evaluating and /or   treating any of the following: The medical record reflects the following:  Risk Factors: 80year old male with pneumonia  Clinical Indicators: 22 WBC 14.1, procal 41.16, sCr 2.1, RR 20, HR 94.    CXR- Bibasilar airspace disease with small bilateral pleural effusions. Treatment: Labs, imaging, IVF, IV Zithromax, IV Rocephin  Options provided:  -- Sepsis due to pneumonia, present on admission  -- Pneumonia without Sepsis  -- Other - I will add my own diagnosis  -- Disagree - Not applicable / Not valid  -- Disagree - Clinically unable to determine / Unknown  -- Refer to Clinical Documentation Reviewer    PROVIDER RESPONSE TEXT:    This patient has sepsis due to pneumonia which was present on admission. Query created by:  Jameel Reddy on 2022 8:21 AM      Electronically signed by:  Brayan Rainey MD 12/3/2022 12:40 AM

## 2022-12-03 NOTE — PROGRESS NOTES
Department of Pharmacy    Notification received from laboratory of positive blood culture results.     Organism(s) detected: Enterobacterales  Applicable Antimicrobial Resistance Genes: None    Recommended change(s) to current antimicrobial regimen: Start Cefepime and D/c Ceftriaxone    Recommendations reviewed with Dr. Gely Jackson via [address], [facility], PharmD, 12/2/2022

## 2022-12-03 NOTE — PLAN OF CARE
Problem: Safety - Adult  Goal: Free from fall injury  Outcome: Progressing  Flowsheets (Taken 12/2/2022 1954)  Free From Fall Injury:   Instruct family/caregiver on patient safety   Based on caregiver fall risk screen, instruct family/caregiver to ask for assistance with transferring infant if caregiver noted to have fall risk factors     Problem: Chronic Conditions and Co-morbidities  Goal: Patient's chronic conditions and co-morbidity symptoms are monitored and maintained or improved  Outcome: Progressing  Flowsheets (Taken 12/2/2022 1954)  Care Plan - Patient's Chronic Conditions and Co-Morbidity Symptoms are Monitored and Maintained or Improved:   Collaborate with multidisciplinary team to address chronic and comorbid conditions and prevent exacerbation or deterioration   Monitor and assess patient's chronic conditions and comorbid symptoms for stability, deterioration, or improvement

## 2022-12-03 NOTE — PROGRESS NOTES
Electrophysiology - PROGRESS NOTE    Admit Date: 12/1/2022     Chief Complaint: acute on chronic dCHF     Interval History:   Patient seen and examined and notes reviewed. Patient is being followed for acute on chronic dCHF. Patient presented to the hospital with complaints of shortness of breath and swelling. Patient is very hard of hearing and questions need to be written out for him to understand. His troponin max was 0.05. He states that he has had no chest discomfort. Patient states that he is at a facility he is unable to ambulate. Did have an episode yesterday when the nurses were trying to put him back to bed after sitting up in the chair lost consciousness. Review of telemetry shows that he was having junctional rhythm at that time. Occasional junctional rhythm through the night. He admits to episodes of dizziness and lightheadedness. Has noted some swelling in his legs recently.     In: 300 [P.O.:300]  Out: 900    Wt Readings from Last 2 Encounters:   12/03/22 261 lb 0.4 oz (118.4 kg)   07/26/22 279 lb (126.6 kg)       Data:   Scheduled Meds:   Scheduled Meds:   ipratropium-albuterol  1 ampule Inhalation BID    allopurinol  200 mg Oral Daily    carvedilol  25 mg Oral BID    levothyroxine  25 mcg Oral Daily    atorvastatin  10 mg Oral Daily    tamsulosin  0.4 mg Oral Daily    sodium chloride flush  5-40 mL IntraVENous 2 times per day    azithromycin  500 mg Oral Q24H    heparin (porcine)  5,000 Units SubCUTAneous 3 times per day    insulin lispro  0-4 Units SubCUTAneous TID WC    insulin lispro  0-4 Units SubCUTAneous Nightly    furosemide  60 mg IntraVENous BID    cefepime  1,000 mg IntraVENous Q12H     Continuous Infusions:   sodium chloride      dextrose       PRN Meds:.sodium chloride flush, sodium chloride, ondansetron **OR** ondansetron, polyethylene glycol, acetaminophen **OR** acetaminophen, glucose, dextrose bolus **OR** dextrose bolus, glucagon (rDNA), dextrose, albuterol  Continuous Infusions:   sodium chloride      dextrose         Intake/Output Summary (Last 24 hours) at 12/3/2022 0916  Last data filed at 12/3/2022 0831  Gross per 24 hour   Intake 300 ml   Output 800 ml   Net -500 ml       CBC:   Lab Results   Component Value Date/Time    WBC 11.1 12/03/2022 05:41 AM    HGB 10.2 12/03/2022 05:41 AM     12/03/2022 05:41 AM     BMP:  Lab Results   Component Value Date/Time     12/03/2022 05:41 AM    K 4.3 12/03/2022 05:41 AM     12/03/2022 05:41 AM    CO2 27 12/03/2022 05:41 AM    BUN 40 12/03/2022 05:41 AM    CREATININE 2.1 12/03/2022 05:41 AM    GLUCOSE 118 12/03/2022 05:41 AM    GLUCOSE 96 10/06/2011 03:30 PM     INR: No results found for: INR     CARDIAC LABS  ENZYMES:  Recent Labs     12/02/22  1800 12/03/22  0004 12/03/22  0541   TROPONINI 0.05* 0.04* 0.04*     FASTING LIPID PANEL:  Lab Results   Component Value Date/Time    HDL 34 07/11/2022 11:44 AM    HDL 36 01/06/2012 02:50 PM    LDLCALC 96 07/11/2022 11:44 AM    TRIG 127 07/11/2022 11:44 AM    TSH 4.18 08/21/2017 05:03 PM     LIVER PROFILE:  Lab Results   Component Value Date/Time    AST 12 12/01/2022 10:23 PM    AST 7 12/01/2021 11:55 AM    ALT <5 12/01/2022 10:23 PM    ALT <5 12/01/2021 11:55 AM       -----------------------------------------------------------------  Telemetry: Personally reviewed NSR, AT,    Objective:   Vitals: /67   Pulse 68   Temp 97.3 °F (36.3 °C) (Oral)   Resp 18   Ht 5' 6\" (1.676 m)   Wt 261 lb 0.4 oz (118.4 kg)   SpO2 98%   BMI 42.13 kg/m²   General appearance: alert, appears stated age and cooperative, No acute distress   Eyes: Conjunctiva and pupils normal and reactive  Skin: Skin color, texture, turgor normal. No rashes or ecchymosis.   Neck: no JVD, supple, symmetrical, trachea midline   Lungs: , no accessory muscle use, no respiratory distress  Heart: RRR  Abdomen: soft, non-tender; bowel sounds normal  Extremities: 1+ BBLE edema, DP +  Psychiatric: normal insight and affect    Patient Active Problem List:     Obstructive sleep apnea     Chronic kidney disease, stage III (moderate) (HCC)     Hyperlipidemia     Insomnia     Proteinuria     Anemia     Fatigue     Dyspnea on exertion     Bilateral leg weakness     Osteoarthritis     Diastolic dysfunction     Elevated PSA     Bell's palsy     Hypoxia     Decreased hearing     Chronic gout without tophus     Essential hypertension     Benign non-nodular prostatic hyperplasia with lower urinary tract symptoms     Gastroesophageal reflux disease without esophagitis     Pulmonary nodules     Hepatic cyst     Coronary artery calcification seen on CT scan     Difficulty walking     Diabetes mellitus type 2 in obese (HCC)     Goiter, nontoxic, multinodular     Primary osteoarthritis of right knee     Primary osteoarthritis of left knee     Morbid obesity (HCC)     Pulmonary emphysema (HCC)     Venous stasis dermatitis of both lower extremities     Acute renal failure superimposed on stage 4 chronic kidney disease (HCC)     Antineutrophil cytoplasmic antibody (ANCA) positive     Vitamin D deficiency     Hypercalcemia     Acute respiratory failure, unsp w hypoxia or hypercapnia (HCC)     Acute respiratory failure with hypoxia and hypercapnia (HCC)     Pneumonia due to infectious organism     RVF (right ventricular failure) (HCC)     Hypervolemia        Assessment & Plan:      Acute dCHF   Elevated trop  HTN  Junctional rhythm    79 y/o man with a h/o HTN, HLD, DM, AVEL, GERD, obesity, CKD, HFpEF, Brock Palsy, who p/w SOB, edema, found to be hypoxic, CXR showed sm bilat pl effusions, trop max 0.05, echo showed EF 55-60%, grade I DD, sev RVH.      Acute dCHF  - EF 55-60%, sev RVH  - CT chest showed BLLL atelectasis/consolidation, BL pl effusions, fluid volume overload, CM, extensive coronary calcifications  - NYHA class III  - QRS 90  - On carvedilol 25 mg BID  - On furosemide 60 mg IV BID - neg 0.7L - continue for now  - Keep K+ > 4.0 and Mg > 2.0, Cr 2.1  - Daily weight, I&O  -Reviewed labs    Elevated trop  - No s/s  - On statin, BB    HTN  - BP controlled  - On nadolol 25 mg twice daily    Junctional rhythm with s/s  - Thought to be a vagal episode  - Will have EP see on Monday  - 2 week Cam on d/c      Deborah Arias CNP  [address]      I spent a total of 35 minutes in care of the patient and greater than 50% of the time was spent counseling with Ross Rangel and coordinating care regarding their diagnosis, treatments and plan of care.

## 2022-12-03 NOTE — CONSULTS
Urology Attending Consult Note      Reason for Consultation: retention; blood in urine    History:   Lala Munoz Is a 80 y.o. male with multiple medical issues who was admitted for hypoxia with suspected CAP and bacteremia. He was noted to have some difficulty voiding and was found to be in retention. Staff attempted Lemon placement but were unsuccessful and had noted some bleeding after the attempt.      Past Medical History:   Diagnosis Date    Anemia 4/9/2012    Antineutrophil cytoplasmic antibody (ANCA) positive 8/21/2017    Atelectasis of left lung 2/18/2016    Bell's palsy 7/24/2015    Left sided - July 2015    Benign non-nodular prostatic hyperplasia with lower urinary tract symptoms 11/3/2015    BPH (benign prostatic hypertrophy) 7/5/2011    Chronic gout without tophus 11/3/2015    Chronic kidney disease, stage III (moderate) (HCC)     Chronic pain of both knees 11/8/2016    Coronary artery calcification seen on CT scan 2/18/2016    Diabetes mellitus, type 2 (HCC)     Diastolic dysfunction 8/1/4694    Dyspnea on exertion 2/7/2014    Edema 1/8/2013    Elevated PSA 5/7/2015    Essential hypertension 11/3/2015    Gastroesophageal reflux disease without esophagitis 11/3/2015    GERD (gastroesophageal reflux disease) 7/5/2011    Gout 9/22/2011    Hepatic cyst 2/18/2016    Hypercalcemia 8/24/2017    Hyperlipidemia     Hypertension     Insomnia 6/29/2010    Localized edema 9/20/2016    Morbid obesity due to excess calories ([address] 11/8/2016    Multiple thyroid nodules 2/18/2016    Nocturia 5/7/2015    Obesity     Obstructive sleep apnea     Primary osteoarthritis of left knee 11/8/2016    Primary osteoarthritis of right knee 11/8/2016    Proteinuria 9/27/2010    Pulmonary emphysema ([address] 2/8/2017    Pulmonary nodules 2/4/2016    Shortness of breath 2/4/2016    Type 2 diabetes mellitus with diabetic chronic kidney disease ([address] 11/3/2015    Type 2 diabetes mellitus with stage 3 chronic kidney disease ([address] 2/4/2016    Type 2 diabetes mellitus with stage 3 chronic kidney disease, without long-term current use of insulin ([facility] 8/5/2016    Vitamin D deficiency 8/21/2017       Past Surgical History:   Procedure Laterality Date    APPENDECTOMY      COLONOSCOPY      Dr. Lissy Jhaveri FLX DX W/COLLJ [facility] WHEN PFRMD N/A 11/6/2018    COLONOSCOPY WITH MAC performed by Kayli Duffy MD at [address] [facility] [address]      left sided - August 2000    TURP      August 7101    UMBILICAL HERNIA REPAIR      August 2000    UPPER GASTROINTESTINAL ENDOSCOPY N/A 11/6/2018    EGD BIOPSY Forcep biopsy of gastric R/O H. 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    Occupation: retired - age 72     Comment: as of May 10, 2013   Tobacco Use    Smoking status: Former     Types: Cigarettes    Smokeless tobacco: Never    Tobacco comments:     quit smoking in 1968   Substance and Sexual Activity    Alcohol use: No    Drug use: No    Sexual activity: Never     Comment:  - 2004   Other Topics Concern    Not on file   Social History Narrative    Past Surgical History     Appendectomy    Hernia Surgery: umbilical hernia repair - August 2000    TURP: August 2000    Cervical Cyst Removed    left nephrectomy - August 2000                                                     Last updated by Bob Oh MD on 10/16/2008              Social History       Marital Status:  (2004)    Children: 1     Employment Status: retired -     Occupation:     Caffeine per Day: one cup of coffee     Alcohol Use: none    Drug Use: none    Tobacco Usage: prior smoker    Cigarettes - Year Quit:                                                 Last updated by Sena Mays MD on 2014                      Family History     mother -  - age 55 - 56 - heart attack    father -  - age 67 -  in his sleep        brother -  - age 61 - 5 - head injury with need for a plate, seizures    brother - Dennis Martin - abnormal weight loss        sister - Charla - throat cancer (smoker) (age 62)        son - Rajeev Horne - obesity                                 Last updated by Laurent Haas MD on 01/15/2009      Social Determinants of Health     Financial Resource Strain: Not on file   Food Insecurity: Not on file   Transportation Needs: Not on file   Physical Activity: Not on file   Stress: Not on file   Social Connections: Not on file   Intimate Partner Violence: Not on file   Housing Stability: Not on file         Review of Systems:  12 system ROS was performed and negative except as noted in HPI. Vitals:  /65   Pulse 62   Temp 97.3 °F (36.3 °C) (Oral)   Resp 21   Ht 5' 6\" (1.676 m)   Wt 261 lb 0.4 oz (118.4 kg)   SpO2 93%   BMI 42.13 kg/m²   Temp  Av °F (36.7 °C)  Min: 97.3 °F (36.3 °C)  Max: 98.3 °F (36.8 °C)    Intake/Output Summary (Last 24 hours) at 12/3/2022 1710  Last data filed at 12/3/2022 1423  Gross per 24 hour   Intake 671.3 ml   Output 1500 ml   Net -828.7 ml         Physical:  /65   Pulse 62   Temp 97.3 °F (36.3 °C) (Oral)   Resp 21   Ht 5' 6\" (1.676 m)   Wt 261 lb 0.4 oz (118.4 kg)   SpO2 93%   BMI 42.13 kg/m²    Constitutional: NAD, WDWN. HEENT: NCAT. Conjunctivae normal. MMM. Cardiovascular: Regular rate. Pulmonary/Chest: Respirations mildly labored    Abdominal: Soft. No distension, tenderness, guarding, or palpable mass. Back: No CVA tenderness. Neurological: A + O x 3. Cranial Nerves II-XII grossly intact. Extremities: AURORA x 4, Warm. No edema. Genitourinary: uncircumcised penis with tight phimosis and lichen sclerosus. Unable to visualize glans      Labs:  WBC:    Lab Results   Component Value Date/Time    WBC 11.1 12/03/2022 05:41 AM     Hemoglobin/Hematocrit:    Lab Results   Component Value Date/Time    HGB 10.2 12/03/2022 05:41 AM    HCT 32.5 12/03/2022 05:41 AM     BMP:    Lab Results   Component Value Date/Time     12/03/2022 05:41 AM    K 4.3 12/03/2022 05:41 AM     12/03/2022 05:41 AM    CO2 27 12/03/2022 05:41 AM    BUN 40 12/03/2022 05:41 AM    LABALBU 4.1 12/01/2022 10:23 PM    CREATININE 2.1 12/03/2022 05:41 AM    CALCIUM 9.6 12/03/2022 05:41 AM    GFRAA 46 09/06/2022 02:35 PM    GFRAA 58 05/10/2013 02:49 PM    LABGLOM 30 12/03/2022 05:41 AM     PT/INR:  No results found for: PROTIME, INR  PTT:  No results found for: APTT[APTT    Urinalysis:   Lab Results   Component Value Date/Time    COLORU Yellow 12/03/2022 01:00 PM    NITRU Negative 12/03/2022 01:00 PM    GLUCOSEU Negative 12/03/2022 01:00 PM    GLUCOSEU NEGATIVE 01/06/2012 02:50 PM    KETUA Negative 12/03/2022 01:00 PM    UROBILINOGEN 0.2 12/03/2022 01:00 PM    BILIRUBINUR Negative 12/03/2022 01:00 PM    [address] [place] [address] NEGATIVE 01/06/2012 02:50 PM        Imaging:   CT CHEST WO CONTRAST  Narrative: EXAM: CT HEAD WO CONTRAST, CT CHEST WO CONTRAST    INDICATION: ams;    COMPARISON: None. TECHNICAL: Axial CT imaging obtained from vertex to skull base. Axial images and multiplanar reformatted images reviewed. Individualized dose optimization technique was used in order to meet ALARA standards for radiation dose reduction.   In addition to   vendor specific dose reduction algorithms, the dose reduction techniques vary based on the specific scanner utilized but frequently include automated exposure control, adjustment of the mA and/or kV according to patient size, and use of iterative   reconstruction technique. IV Contrast: None. FINDINGS:    INTRACRANIAL HEMORRHAGE: None. VENTRICLES: Normal in size and configuration for age. BRAIN PARENCHYMA: Age appropriate global atrophy. Periventricular and subcortical white matter low attenuation most consistent with chronic small vessel disease. Gray-white differentiation is otherwise maintained. No intracranial mass effect. SKULL: No destructive osseous process or fracture. PARANASAL SINUSES AND MASTOIDS: No acute sinusitis or mastoiditis. ORBITS: Normal.    EXTRACRANIAL SOFT TISSUES: Normal.  Impression: No acute intracranial process. Chronic small vessel ischemic changes. EXAM: CT CHEST WITHOUT CONTRAST    INDICATION: Shortness of breath    COMPARISON: February 18, 2016    TECHNIQUE: CT of the chest was performed without contrast. Axial images, multiplanar reformatted images and axial maximum intensity projection images provided for review. Individualized dose optimization technique was used in order to meet ALARA   standards for radiation dose reduction. In addition to vendor specific dose reduction algorithms, the dose reduction techniques vary based on the specific scanner utilized but frequently include automated exposure control, adjustment of the mA and/or kV   according to patient size, and use of iterative reconstruction technique. CONTRAST: None. FINDINGS:    LUNGS AND AIRWAYS: Frothy mucus within the lower trachea. Bilateral lower lobe atelectasis/consolidation. Milder atelectasis of the posterior upper lobes. Mild smooth interlobular septal thickening particularly in the lung apices consistent with a   component of interstitial edema. PLEURA: Trace bilateral pleural effusions. HEART AND GREAT VESSELS: Cardiomegaly. Extensive coronary artery calcification. Thoracic aorta and main pulmonary artery normal in caliber. No pericardial effusion.     ADENOPATHY/MEDIASTINUM: Mildly prominent mediastinal lymph nodes, favored to be reactive in nature given the associated airspace disease. CHEST WALL / LOWER NECK: No significant abnormality. UPPER ABDOMEN: Large at least 13 mm cyst again within the visualized liver. Prior left nephrectomy with surgical clips. BONES: No significant abnormality. IMPRESSION:    Bilateral lower lobe atelectasis/consolidation. Associated trace bilateral pleural effusions. Mild smooth interlobular septal thickening suggestive of mild volume overload/interstitial edema. Cardiomegaly. Extensive coronary artery calcification. Hepatic cyst.  CT HEAD WO CONTRAST  Narrative: EXAM: CT HEAD WO CONTRAST, CT CHEST WO CONTRAST    INDICATION: ams;    COMPARISON: None. TECHNICAL: Axial CT imaging obtained from vertex to skull base. Axial images and multiplanar reformatted images reviewed. Individualized dose optimization technique was used in order to meet ALARA standards for radiation dose reduction. In addition to   vendor specific dose reduction algorithms, the dose reduction techniques vary based on the specific scanner utilized but frequently include automated exposure control, adjustment of the mA and/or kV according to patient size, and use of iterative   reconstruction technique. IV Contrast: None. FINDINGS:    INTRACRANIAL HEMORRHAGE: None. VENTRICLES: Normal in size and configuration for age. BRAIN PARENCHYMA: Age appropriate global atrophy. Periventricular and subcortical white matter low attenuation most consistent with chronic small vessel disease. Gray-white differentiation is otherwise maintained. No intracranial mass effect. SKULL: No destructive osseous process or fracture. PARANASAL SINUSES AND MASTOIDS: No acute sinusitis or mastoiditis. ORBITS: Normal.    EXTRACRANIAL SOFT TISSUES: Normal.  Impression: No acute intracranial process. Chronic small vessel ischemic changes.       EXAM: CT CHEST WITHOUT CONTRAST    INDICATION: Shortness of breath    COMPARISON: February 18, 2016    TECHNIQUE: CT of the chest was performed without contrast. Axial images, multiplanar reformatted images and axial maximum intensity projection images provided for review. Individualized dose optimization technique was used in order to meet ALARA   standards for radiation dose reduction. In addition to vendor specific dose reduction algorithms, the dose reduction techniques vary based on the specific scanner utilized but frequently include automated exposure control, adjustment of the mA and/or kV   according to patient size, and use of iterative reconstruction technique. CONTRAST: None. FINDINGS:    LUNGS AND AIRWAYS: Frothy mucus within the lower trachea. Bilateral lower lobe atelectasis/consolidation. Milder atelectasis of the posterior upper lobes. Mild smooth interlobular septal thickening particularly in the lung apices consistent with a   component of interstitial edema. PLEURA: Trace bilateral pleural effusions. HEART AND GREAT VESSELS: Cardiomegaly. Extensive coronary artery calcification. Thoracic aorta and main pulmonary artery normal in caliber. No pericardial effusion. ADENOPATHY/MEDIASTINUM: Mildly prominent mediastinal lymph nodes, favored to be reactive in nature given the associated airspace disease. CHEST WALL / LOWER NECK: No significant abnormality. UPPER ABDOMEN: Large at least 13 mm cyst again within the visualized liver. Prior left nephrectomy with surgical clips. BONES: No significant abnormality. IMPRESSION:    Bilateral lower lobe atelectasis/consolidation. Associated trace bilateral pleural effusions. Mild smooth interlobular septal thickening suggestive of mild volume overload/interstitial edema. Cardiomegaly. Extensive coronary artery calcification.     Hepatic cyst.        Impression/Plan:   80 y.o. M w/ multiple medical issues who p/w hypoxic respiratory failure, CAP, bacteremia. Had difficulty voiding and found to have some retention. I saw him last night (unable to leave a note during Epic downtime). I was unable to place a regular Lemon. I was able to get a wire into the bladder but attempts at placing a Lemon over the wire were met with resistance. I was able to dilate what felt like a urethral stricture. Lemon placed with return of clear yellow urine. Suspect that bleeding seen previously was from a traumatic placement. Lemon to stay until Tuesday 12/6 at the earliest, then can be removed when no longer medically necessary. Check PVR during his void trial     We will sign off for now. Please call with questions.      Inocente Little MD

## 2022-12-04 PROBLEM — R78.81 ENTEROCOCCAL BACTEREMIA: Status: ACTIVE | Noted: 2022-12-04

## 2022-12-04 PROBLEM — B95.2 ENTEROCOCCAL BACTEREMIA: Status: ACTIVE | Noted: 2022-12-04

## 2022-12-04 LAB
ANION GAP SERPL CALCULATED.3IONS-SCNC: 8 MMOL/L (ref 3–16)
BASOPHILS ABSOLUTE: 0 K/UL (ref 0–0.2)
BASOPHILS RELATIVE PERCENT: 0.2 %
BLOOD CULTURE, ROUTINE: ABNORMAL
BLOOD CULTURE, ROUTINE: ABNORMAL
BUN BLDV-MCNC: 41 MG/DL (ref 7–20)
CALCIUM SERPL-MCNC: 9.6 MG/DL (ref 8.3–10.6)
CHLORIDE BLD-SCNC: 102 MMOL/L (ref 99–110)
CO2: 29 MMOL/L (ref 21–32)
CREAT SERPL-MCNC: 1.9 MG/DL (ref 0.8–1.3)
CULTURE, BLOOD 2: ABNORMAL
EOSINOPHILS ABSOLUTE: 0.2 K/UL (ref 0–0.6)
EOSINOPHILS RELATIVE PERCENT: 2.8 %
GFR SERPL CREATININE-BSD FRML MDRD: 33 ML/MIN/{1.73_M2}
GLUCOSE BLD-MCNC: 112 MG/DL (ref 70–99)
GLUCOSE BLD-MCNC: 127 MG/DL (ref 70–99)
GLUCOSE BLD-MCNC: 130 MG/DL (ref 70–99)
GLUCOSE BLD-MCNC: 134 MG/DL (ref 70–99)
GLUCOSE BLD-MCNC: 143 MG/DL (ref 70–99)
HCT VFR BLD CALC: 31.1 % (ref 40.5–52.5)
HEMOGLOBIN: 10 G/DL (ref 13.5–17.5)
LYMPHOCYTES ABSOLUTE: 0.9 K/UL (ref 1–5.1)
LYMPHOCYTES RELATIVE PERCENT: 10.3 %
MAGNESIUM: 2.4 MG/DL (ref 1.8–2.4)
MCH RBC QN AUTO: 27.8 PG (ref 26–34)
MCHC RBC AUTO-ENTMCNC: 32.2 G/DL (ref 31–36)
MCV RBC AUTO: 86.2 FL (ref 80–100)
MONOCYTES ABSOLUTE: 0.7 K/UL (ref 0–1.3)
MONOCYTES RELATIVE PERCENT: 7.5 %
NEUTROPHILS ABSOLUTE: 7 K/UL (ref 1.7–7.7)
NEUTROPHILS RELATIVE PERCENT: 79.2 %
ORGANISM: ABNORMAL
PDW BLD-RTO: 16 % (ref 12.4–15.4)
PERFORMED ON: ABNORMAL
PLATELET # BLD: 149 K/UL (ref 135–450)
PMV BLD AUTO: 8.9 FL (ref 5–10.5)
POTASSIUM REFLEX MAGNESIUM: 3.9 MMOL/L (ref 3.5–5.1)
RBC # BLD: 3.61 M/UL (ref 4.2–5.9)
SODIUM BLD-SCNC: 139 MMOL/L (ref 136–145)
WBC # BLD: 8.9 K/UL (ref 4–11)

## 2022-12-04 PROCEDURE — 82570 ASSAY OF URINE CREATININE: CPT

## 2022-12-04 PROCEDURE — 6360000002 HC RX W HCPCS: Performed by: PHYSICIAN ASSISTANT

## 2022-12-04 PROCEDURE — 36415 COLL VENOUS BLD VENIPUNCTURE: CPT

## 2022-12-04 PROCEDURE — 6370000000 HC RX 637 (ALT 250 FOR IP): Performed by: NURSE PRACTITIONER

## 2022-12-04 PROCEDURE — 87449 NOS EACH ORGANISM AG IA: CPT

## 2022-12-04 PROCEDURE — 93005 ELECTROCARDIOGRAM TRACING: CPT | Performed by: NURSE PRACTITIONER

## 2022-12-04 PROCEDURE — 6370000000 HC RX 637 (ALT 250 FOR IP): Performed by: HOSPITALIST

## 2022-12-04 PROCEDURE — 85025 COMPLETE CBC W/AUTO DIFF WBC: CPT

## 2022-12-04 PROCEDURE — 2580000003 HC RX 258: Performed by: PHYSICIAN ASSISTANT

## 2022-12-04 PROCEDURE — 80048 BASIC METABOLIC PNL TOTAL CA: CPT

## 2022-12-04 PROCEDURE — 2060000000 HC ICU INTERMEDIATE R&B

## 2022-12-04 PROCEDURE — 99232 SBSQ HOSP IP/OBS MODERATE 35: CPT | Performed by: HOSPITALIST

## 2022-12-04 PROCEDURE — 94640 AIRWAY INHALATION TREATMENT: CPT

## 2022-12-04 PROCEDURE — 83735 ASSAY OF MAGNESIUM: CPT

## 2022-12-04 PROCEDURE — 99233 SBSQ HOSP IP/OBS HIGH 50: CPT | Performed by: NURSE PRACTITIONER

## 2022-12-04 PROCEDURE — 6370000000 HC RX 637 (ALT 250 FOR IP): Performed by: PHYSICIAN ASSISTANT

## 2022-12-04 RX ORDER — POLYETHYLENE GLYCOL 3350 17 G/17G
17 POWDER, FOR SOLUTION ORAL 2 TIMES DAILY
Status: DISCONTINUED | OUTPATIENT
Start: 2022-12-04 | End: 2022-12-06

## 2022-12-04 RX ORDER — POTASSIUM CHLORIDE 20 MEQ/1
20 TABLET, EXTENDED RELEASE ORAL ONCE
Status: COMPLETED | OUTPATIENT
Start: 2022-12-04 | End: 2022-12-04

## 2022-12-04 RX ADMIN — POLYETHYLENE GLYCOL 3350 17 G: 17 POWDER, FOR SOLUTION ORAL at 08:59

## 2022-12-04 RX ADMIN — ALLOPURINOL 200 MG: 100 TABLET ORAL at 08:51

## 2022-12-04 RX ADMIN — SODIUM CHLORIDE, PRESERVATIVE FREE 10 ML: 5 INJECTION INTRAVENOUS at 22:27

## 2022-12-04 RX ADMIN — IPRATROPIUM BROMIDE AND ALBUTEROL SULFATE 1 AMPULE: 2.5; .5 SOLUTION RESPIRATORY (INHALATION) at 20:35

## 2022-12-04 RX ADMIN — LEVOTHYROXINE SODIUM 25 MCG: 0.03 TABLET ORAL at 08:51

## 2022-12-04 RX ADMIN — HEPARIN SODIUM 5000 UNITS: 5000 INJECTION INTRAVENOUS; SUBCUTANEOUS at 06:31

## 2022-12-04 RX ADMIN — HEPARIN SODIUM 5000 UNITS: 5000 INJECTION INTRAVENOUS; SUBCUTANEOUS at 22:26

## 2022-12-04 RX ADMIN — HEPARIN SODIUM 5000 UNITS: 5000 INJECTION INTRAVENOUS; SUBCUTANEOUS at 15:26

## 2022-12-04 RX ADMIN — POTASSIUM CHLORIDE 20 MEQ: 1500 TABLET, EXTENDED RELEASE ORAL at 08:51

## 2022-12-04 RX ADMIN — CEFEPIME 1000 MG: 1 INJECTION, POWDER, FOR SOLUTION INTRAMUSCULAR; INTRAVENOUS at 11:45

## 2022-12-04 RX ADMIN — ATORVASTATIN CALCIUM 10 MG: 20 TABLET, FILM COATED ORAL at 08:51

## 2022-12-04 RX ADMIN — TAMSULOSIN HYDROCHLORIDE 0.4 MG: 0.4 CAPSULE ORAL at 08:51

## 2022-12-04 RX ADMIN — SODIUM CHLORIDE, PRESERVATIVE FREE 10 ML: 5 INJECTION INTRAVENOUS at 08:51

## 2022-12-04 RX ADMIN — CARVEDILOL 25 MG: 25 TABLET, FILM COATED ORAL at 21:28

## 2022-12-04 ASSESSMENT — ENCOUNTER SYMPTOMS
RESPIRATORY NEGATIVE: 1
EYES NEGATIVE: 1
GASTROINTESTINAL NEGATIVE: 1

## 2022-12-04 NOTE — PROGRESS NOTES
Progress Note    Admit Date: 12/1/2022  Day: 3  Diet: ADULT DIET; Regular; Low Sodium (2 gm)    CC: Shortness of breath    Interval history: Patient seen and examined this morning at bedside. Patient was sitting up comfortably in his bed. No acute events overnight. He remains on 3 L nasal cannula. He denies any shortness of breath or chest pain. He has good urine output and had 2.5 L out to his Lemon yesterday. He states he has not had a bowl movement in 5 days. Will order Glycolax BID for him. He remains hemodynamically stable and afebrile. HPI: Tania Moreau is a 80 y.o. male w/ PMHX of multiple chronic comorbidities including DM2, AVEL, HFpEF, CKD, HTN, HLD, GERD, obesity listed below who presents with a chief complaint of shortness of breath. On chart review it appears family felt patient was grunting and having what appeared to be abdominal pain and wanted him to be evaluated. On arrival EMS noted patient with sats 80% on room air and was placed on 6 L nasal cannula. Patient is apparently hard of hearing at baseline and unfortunately did not bring his hearing aids. In the ED vitals noted /107, heart rate 94, respirations 20, temp 98.8, O2 93% on 6 L nasal cannula. CBC noted leukocytosis with 14.1 with neutrophil predominance and low absolute lymphocyte count at 0.7, stable normocytic anemia at 11.1 g/dL and normal platelet count. BMP remarkable for BUN 31, creatinine 2.1 (baseline~1.7). Lactic acid 1.5. Troponin 0.04. EKG without any obvious ischemic changes. LFTs normal.  VBG with pH 7.30, PCO2 64.3, bicarb 31.6. CXR with bibasilar airspace disease with small bilateral pleural effusions. Head CT is pending at this time of evaluation. He was started on Rocephin and azithromycin in the ED. Unfortunately patient is not able to give much information in the ED due to not having his hearing aids.   It is unclear if he is altered from his baseline or if it is due to him not being able to hear appropriately. I attempted to reach out to family for collateral information including his son Dawit Solares (called multiple times without answering) and his step daughter Leeanne Sumner ( wrong number). Medications:     Scheduled Meds:   potassium chloride  20 mEq Oral Once    ipratropium-albuterol  1 ampule Inhalation BID    allopurinol  200 mg Oral Daily    carvedilol  25 mg Oral BID    levothyroxine  25 mcg Oral Daily    atorvastatin  10 mg Oral Daily    tamsulosin  0.4 mg Oral Daily    sodium chloride flush  5-40 mL IntraVENous 2 times per day    azithromycin  500 mg Oral Q24H    heparin (porcine)  5,000 Units SubCUTAneous 3 times per day    insulin lispro  0-4 Units SubCUTAneous TID WC    insulin lispro  0-4 Units SubCUTAneous Nightly    cefepime  1,000 mg IntraVENous Q12H     Continuous Infusions:   sodium chloride      dextrose       PRN Meds:sodium chloride flush, sodium chloride, ondansetron **OR** ondansetron, polyethylene glycol, acetaminophen **OR** acetaminophen, glucose, dextrose bolus **OR** dextrose bolus, glucagon (rDNA), dextrose, albuterol    Objective:   Vitals:   T-max:  Patient Vitals for the past 8 hrs:   BP Temp Temp src Pulse Resp SpO2 Weight   12/04/22 0746 118/68 98.3 °F (36.8 °C) Axillary 59 20 94 % --   12/04/22 0633 102/66 98.1 °F (36.7 °C) -- 62 18 93 % --   12/04/22 0424 -- -- -- -- -- -- 260 lb 2.3 oz (118 kg)       Intake/Output Summary (Last 24 hours) at 12/4/2022 0838  Last data filed at 12/4/2022 0424  Gross per 24 hour   Intake 851.3 ml   Output 2350 ml   Net -1498.7 ml       Review of Systems   Constitutional: Negative. HENT: Negative. Eyes: Negative. Respiratory: Negative. Cardiovascular: Negative. Gastrointestinal: Negative. Genitourinary: Negative. Musculoskeletal: Negative. Skin: Negative. Neurological: Negative. Psychiatric/Behavioral: Negative. Physical Exam  Vitals reviewed. Constitutional:       General: He is awake. TRIGLYCERIDE  ABGs:    Recent Labs     12/02/22  1409   PHART 7.295*   AEL3SCR 52.2*   PO2ART 72.7*   HOV5TUV 25.4   BEART -1   [id number] 92*   [id number] 27       INR: No results for input(s): INR in the last 72 hours. Lactate: No results for input(s): LACTATE in the last 72 hours. Cultures:  -----------------------------------------------------------------  RAD:   CT HEAD WO CONTRAST   Final Result      No acute intracranial process. Chronic small vessel ischemic changes. EXAM: CT CHEST WITHOUT CONTRAST      INDICATION: Shortness of breath      COMPARISON: February 18, 2016      TECHNIQUE: CT of the chest was performed without contrast. Axial images, multiplanar reformatted images and axial maximum intensity projection images provided for review. Individualized dose optimization technique was used in order to meet ALARA    standards for radiation dose reduction. In addition to vendor specific dose reduction algorithms, the dose reduction techniques vary based on the specific scanner utilized but frequently include automated exposure control, adjustment of the mA and/or kV    according to patient size, and use of iterative reconstruction technique. CONTRAST: None. FINDINGS:      LUNGS AND AIRWAYS: Frothy mucus within the lower trachea. Bilateral lower lobe atelectasis/consolidation. Milder atelectasis of the posterior upper lobes. Mild smooth interlobular septal thickening particularly in the lung apices consistent with a    component of interstitial edema. PLEURA: Trace bilateral pleural effusions. HEART AND GREAT VESSELS: Cardiomegaly. Extensive coronary artery calcification. Thoracic aorta and main pulmonary artery normal in caliber. No pericardial effusion. ADENOPATHY/MEDIASTINUM: Mildly prominent mediastinal lymph nodes, favored to be reactive in nature given the associated airspace disease. CHEST WALL / LOWER NECK: No significant abnormality.       UPPER ABDOMEN: Large at least 13 mm cyst again within the visualized liver. Prior left nephrectomy with surgical clips. BONES: No significant abnormality. IMPRESSION:      Bilateral lower lobe atelectasis/consolidation. Associated trace bilateral pleural effusions. Mild smooth interlobular septal thickening suggestive of mild volume overload/interstitial edema. Cardiomegaly. Extensive coronary artery calcification. Hepatic cyst.      CT CHEST WO CONTRAST   Final Result      No acute intracranial process. Chronic small vessel ischemic changes. EXAM: CT CHEST WITHOUT CONTRAST      INDICATION: Shortness of breath      COMPARISON: February 18, 2016      TECHNIQUE: CT of the chest was performed without contrast. Axial images, multiplanar reformatted images and axial maximum intensity projection images provided for review. Individualized dose optimization technique was used in order to meet ALARA    standards for radiation dose reduction. In addition to vendor specific dose reduction algorithms, the dose reduction techniques vary based on the specific scanner utilized but frequently include automated exposure control, adjustment of the mA and/or kV    according to patient size, and use of iterative reconstruction technique. CONTRAST: None. FINDINGS:      LUNGS AND AIRWAYS: Frothy mucus within the lower trachea. Bilateral lower lobe atelectasis/consolidation. Milder atelectasis of the posterior upper lobes. Mild smooth interlobular septal thickening particularly in the lung apices consistent with a    component of interstitial edema. PLEURA: Trace bilateral pleural effusions. HEART AND GREAT VESSELS: Cardiomegaly. Extensive coronary artery calcification. Thoracic aorta and main pulmonary artery normal in caliber. No pericardial effusion.       ADENOPATHY/MEDIASTINUM: Mildly prominent mediastinal lymph nodes, favored to be reactive in nature given the associated airspace disease. CHEST WALL / LOWER NECK: No significant abnormality. UPPER ABDOMEN: Large at least 13 mm cyst again within the visualized liver. Prior left nephrectomy with surgical clips. BONES: No significant abnormality. IMPRESSION:      Bilateral lower lobe atelectasis/consolidation. Associated trace bilateral pleural effusions. Mild smooth interlobular septal thickening suggestive of mild volume overload/interstitial edema. Cardiomegaly. Extensive coronary artery calcification. Hepatic cyst.      XR CHEST PORTABLE   Final Result      1. Bibasilar airspace disease with small bilateral pleural effusions. Assessment/Plan:     Raghav Girard is a 80 y.o. male w/ multiple chronic co morbidities p/w hypoxia and questionable AMS. Dx CAP. Treatment with IVF, Abx, O2     Acute hypoxic hypercapnic respiratory failure (improving)  HFpEF exacerbation  Suspected CAP   Not on O2 at home. CXR with bibasilar airspace disease. VBG with pH 7.33 (initially 7.3), pCO2 53.7 mmHg (initially 64 mmHg). Pro-calcitonin 41.16 ng/mL. COVID and Influenza negative  - Continue Cefepime + Azithromycin  -Blood cultures growing Enterococcus. Switched ceftriaxone to cefepime. Will follow up on sensitivities   - Continue supp O2 to maintain O2 >92% and wean as possible  - Duonebs  - Lasix 60 mg BID IV  - Cardiology c/s, joselin recs  - Strict I&O, Daily weights  - optimize lytes ( Mag >2, K >4) and monitor with BMP     Enterobacter bacteremia  + on both Cxs.  - Stopped Rocephin (s/p 1 day. Started Cefepime (day 1)  - F/u sensitivities  - Called nurse to send a UA to r/u source     Questionable AMS (ruled out)  - Head CT with no acute abnormality   - Patient is hard-hearing and likely was perceived as having AMS. He might had also mild confusion due to the sepsis and acute process. He is currently A&O x4.       MARY ANN on CKD  Might be related to CHF exacerbation.  - Cre 2.2 (Baseline ~1.6)  - As

## 2022-12-04 NOTE — PROGRESS NOTES
Cardiology - PROGRESS NOTE    Admit Date: 12/1/2022     Chief Complaint: acute on chronic dCHF     Interval History:   Patient seen and examined and notes reviewed. Patient is being followed for acute on chronic dCHF. Patient presented to the hospital with complaints of shortness of breath and swelling. Patient is very hard of hearing and questions need to be written out for him to understand. His troponin max was 0.05. He states that he has had no chest discomfort. Patient states that he is at a facility and he is unable to ambulate. Patient had an episode 12/2/22 when the nurses were trying to put him back to bed after sitting up in the chair and he lost consciousness. Review of telemetry shows that he was having junctional rhythm at that time. Noted to have occasional junctional rhythm overnight into Saturday. No junctional rhythm noted overnight into Sunday. Yesterday he admitted to episodes of dizziness and lightheadedness, none this am.  He has noted some swelling in his legs recently. He denies CP or SOB. BBB appearance on tele. In: 1091.3 [P.O.:1070;  I.V.:21.3]  Out: 2550    Wt Readings from Last 2 Encounters:   12/04/22 260 lb 2.3 oz (118 kg)   07/26/22 279 lb (126.6 kg)       Data:   Scheduled Meds:   Scheduled Meds:   ipratropium-albuterol  1 ampule Inhalation BID    allopurinol  200 mg Oral Daily    carvedilol  25 mg Oral BID    levothyroxine  25 mcg Oral Daily    atorvastatin  10 mg Oral Daily    tamsulosin  0.4 mg Oral Daily    sodium chloride flush  5-40 mL IntraVENous 2 times per day    azithromycin  500 mg Oral Q24H    heparin (porcine)  5,000 Units SubCUTAneous 3 times per day    insulin lispro  0-4 Units SubCUTAneous TID WC    insulin lispro  0-4 Units SubCUTAneous Nightly    cefepime  1,000 mg IntraVENous Q12H     Continuous Infusions:   sodium chloride      dextrose       PRN Meds:.sodium chloride flush, sodium chloride, ondansetron **OR** ondansetron, polyethylene glycol, acetaminophen **OR** acetaminophen, glucose, dextrose bolus **OR** dextrose bolus, glucagon (rDNA), dextrose, albuterol  Continuous Infusions:   sodium chloride      dextrose         Intake/Output Summary (Last 24 hours) at 12/4/2022 5339  Last data filed at 12/4/2022 0424  Gross per 24 hour   Intake 1091.3 ml   Output 2550 ml   Net -1458.7 ml       CBC:   Lab Results   Component Value Date/Time    WBC 8.9 12/04/2022 06:16 AM    HGB 10.0 12/04/2022 06:16 AM     12/04/2022 06:16 AM     BMP:  Lab Results   Component Value Date/Time     12/04/2022 06:16 AM    K 3.9 12/04/2022 06:16 AM     12/04/2022 06:16 AM    CO2 29 12/04/2022 06:16 AM    BUN 41 12/04/2022 06:16 AM    CREATININE 1.9 12/04/2022 06:16 AM    GLUCOSE 130 12/04/2022 06:16 AM    GLUCOSE 96 10/06/2011 03:30 PM     INR: No results found for: INR     CARDIAC LABS  ENZYMES:  Recent Labs     12/02/22  1800 12/03/22  0004 12/03/22  0541   TROPONINI 0.05* 0.04* 0.04*     FASTING LIPID PANEL:  Lab Results   Component Value Date/Time    HDL 34 07/11/2022 11:44 AM    HDL 36 01/06/2012 02:50 PM    LDLCALC 96 07/11/2022 11:44 AM    TRIG 127 07/11/2022 11:44 AM    TSH 4.18 08/21/2017 05:03 PM     LIVER PROFILE:  Lab Results   Component Value Date/Time    AST 12 12/01/2022 10:23 PM    AST 7 12/01/2021 11:55 AM    ALT <5 12/01/2022 10:23 PM    ALT <5 12/01/2021 11:55 AM       -----------------------------------------------------------------  Telemetry: Personally reviewed NSR AT,    Objective:   Vitals: /68   Pulse 59   Temp 98.3 °F (36.8 °C) (Axillary)   Resp 20   Ht 5' 6\" (1.676 m)   Wt 260 lb 2.3 oz (118 kg)   SpO2 94%   BMI 41.99 kg/m²   General appearance: alert, appears stated age and cooperative, No acute distress   Eyes: Conjunctiva and pupils normal and reactive  Skin: Skin color, texture, turgor normal. No rashes or ecchymosis.   Neck: no JVD, supple, symmetrical, trachea midline   Lungs: , no accessory muscle use, no respiratory distress  Heart: RRR  Abdomen: soft, non-tender; bowel sounds normal  Extremities: 1+ BBLE edema, DP +  Psychiatric: normal insight and affect    Patient Active Problem List:     Obstructive sleep apnea     Chronic kidney disease, stage III (moderate) (HCC)     Hyperlipidemia     Insomnia     Proteinuria     Anemia     Fatigue     Dyspnea on exertion     Bilateral leg weakness     Osteoarthritis     Diastolic dysfunction     Elevated PSA     Bell's palsy     Hypoxia     Decreased hearing     Chronic gout without tophus     Essential hypertension     Benign non-nodular prostatic hyperplasia with lower urinary tract symptoms     Gastroesophageal reflux disease without esophagitis     Pulmonary nodules     Hepatic cyst     Coronary artery calcification seen on CT scan     Difficulty walking     Diabetes mellitus type 2 in obese (HCC)     Goiter, nontoxic, multinodular     Primary osteoarthritis of right knee     Primary osteoarthritis of left knee     Morbid obesity (HCC)     Pulmonary emphysema (HCC)     Venous stasis dermatitis of both lower extremities     Acute renal failure superimposed on stage 4 chronic kidney disease (HCC)     Antineutrophil cytoplasmic antibody (ANCA) positive     Vitamin D deficiency     Hypercalcemia     Acute respiratory failure, unsp w hypoxia or hypercapnia (HCC)     Acute respiratory failure with hypoxia and hypercapnia (HCC)     Pneumonia due to infectious organism     RVF (right ventricular failure) (HCC)     Hypervolemia        Assessment & Plan:      Acute dCHF   Elevated trop  HTN  Junctional rhythm    79 y/o man with a h/o HTN, HLD, DM, AVEL, GERD, obesity, CKD, HFpEF, [place] Palsy, who p/w SOB, edema, found to be hypoxic, CXR showed sm bilat pl effusions, trop max 0.05, echo showed EF 55-60%, grade I DD, sev RVH.      Acute dCHF  - EF 55-60%, sev RVH  - CT chest showed BLLL atelectasis/consolidation, BL pl effusions, fluid volume overload, CM, extensive coronary calcifications  - NYHA class III  - QRS 90  - On carvedilol 25 mg BID  - On furosemide 60 mg IV BID - neg 2.1L - continue for now  - Keep K+ > 4.0 and Mg > 2.0, Cr 1.9 - replacement ordered  - Daily weight, I&O  - Reviewed labs    Elevated trop  - No s/s  - On statin, BB    HTN  - BP controlled  - On nadolol 25 mg twice daily    Junctional rhythm with s/s  - Thought to be a vagal episode  - Will have EP see on Monday  - 2 week Cam on d/c  - Appears to have a BBB on tele - will check an EKG      Raoul Mckinney [address]      I spent a total of 35 minutes in care of the patient and greater than 50% of the time was spent counseling with Ana Maria Saini and coordinating care regarding their diagnosis, treatments and plan of care.

## 2022-12-04 NOTE — PROGRESS NOTES
Pt has done well over night with no complaints of pain or discomfort. Pt is VERY [employer]. Pt continues to have coude holliday in place that Urology placed and is WNL. Pt remains on 3L of oxygen sating in the low 90's, VS stable otherwise. Pt did attempt to have a BM on the [facility], he was transitioned with the [facility], he did well getting up to get on commode but did not do well getting off of BSC and back to bed, pt was weak and could not get up on his own and required [facility] help from 2 staff. Both PIV's continue to be working WNL. Pt resting at this time and appears to be peaceful. Writer will continue to monitor.

## 2022-12-05 PROBLEM — R78.81 GRAM-NEGATIVE BACTEREMIA: Status: ACTIVE | Noted: 2022-12-05

## 2022-12-05 PROBLEM — N39.0 COMPLICATED UTI (URINARY TRACT INFECTION): Status: ACTIVE | Noted: 2022-12-05

## 2022-12-05 LAB
ANION GAP SERPL CALCULATED.3IONS-SCNC: 6 MMOL/L (ref 3–16)
BASOPHILS ABSOLUTE: 0 K/UL (ref 0–0.2)
BASOPHILS RELATIVE PERCENT: 0.7 %
BUN BLDV-MCNC: 32 MG/DL (ref 7–20)
CALCIUM SERPL-MCNC: 10 MG/DL (ref 8.3–10.6)
CHLORIDE BLD-SCNC: 102 MMOL/L (ref 99–110)
CO2: 31 MMOL/L (ref 21–32)
CREAT SERPL-MCNC: 1.6 MG/DL (ref 0.8–1.3)
CREATININE URINE: 78.7 MG/DL (ref 39–259)
EKG ATRIAL RATE: 61 BPM
EKG DIAGNOSIS: NORMAL
EKG P AXIS: 41 DEGREES
EKG P-R INTERVAL: 206 MS
EKG Q-T INTERVAL: 478 MS
EKG QRS DURATION: 90 MS
EKG QTC CALCULATION (BAZETT): 481 MS
EKG R AXIS: 0 DEGREES
EKG T AXIS: -53 DEGREES
EKG VENTRICULAR RATE: 61 BPM
EOSINOPHILS ABSOLUTE: 0.4 K/UL (ref 0–0.6)
EOSINOPHILS RELATIVE PERCENT: 5.5 %
GFR SERPL CREATININE-BSD FRML MDRD: 41 ML/MIN/{1.73_M2}
GLUCOSE BLD-MCNC: 123 MG/DL (ref 70–99)
GLUCOSE BLD-MCNC: 124 MG/DL (ref 70–99)
GLUCOSE BLD-MCNC: 125 MG/DL (ref 70–99)
GLUCOSE BLD-MCNC: 139 MG/DL (ref 70–99)
GLUCOSE BLD-MCNC: 197 MG/DL (ref 70–99)
HCT VFR BLD CALC: 33.7 % (ref 40.5–52.5)
HEMOGLOBIN: 10.7 G/DL (ref 13.5–17.5)
LYMPHOCYTES ABSOLUTE: 0.9 K/UL (ref 1–5.1)
LYMPHOCYTES RELATIVE PERCENT: 13.2 %
MCH RBC QN AUTO: 27.4 PG (ref 26–34)
MCHC RBC AUTO-ENTMCNC: 31.8 G/DL (ref 31–36)
MCV RBC AUTO: 86.1 FL (ref 80–100)
MONOCYTES ABSOLUTE: 0.5 K/UL (ref 0–1.3)
MONOCYTES RELATIVE PERCENT: 7.8 %
NEUTROPHILS ABSOLUTE: 4.7 K/UL (ref 1.7–7.7)
NEUTROPHILS RELATIVE PERCENT: 72.8 %
PDW BLD-RTO: 15.9 % (ref 12.4–15.4)
PERFORMED ON: ABNORMAL
PLATELET # BLD: 166 K/UL (ref 135–450)
PMV BLD AUTO: 9 FL (ref 5–10.5)
POTASSIUM REFLEX MAGNESIUM: 4.1 MMOL/L (ref 3.5–5.1)
RBC # BLD: 3.92 M/UL (ref 4.2–5.9)
SODIUM BLD-SCNC: 139 MMOL/L (ref 136–145)
WBC # BLD: 6.4 K/UL (ref 4–11)

## 2022-12-05 PROCEDURE — 6370000000 HC RX 637 (ALT 250 FOR IP)

## 2022-12-05 PROCEDURE — 2700000000 HC OXYGEN THERAPY PER DAY

## 2022-12-05 PROCEDURE — 93010 ELECTROCARDIOGRAM REPORT: CPT | Performed by: INTERNAL MEDICINE

## 2022-12-05 PROCEDURE — 2060000000 HC ICU INTERMEDIATE R&B

## 2022-12-05 PROCEDURE — 85025 COMPLETE CBC W/AUTO DIFF WBC: CPT

## 2022-12-05 PROCEDURE — 99233 SBSQ HOSP IP/OBS HIGH 50: CPT | Performed by: INTERNAL MEDICINE

## 2022-12-05 PROCEDURE — 2580000003 HC RX 258: Performed by: PHYSICIAN ASSISTANT

## 2022-12-05 PROCEDURE — 94761 N-INVAS EAR/PLS OXIMETRY MLT: CPT

## 2022-12-05 PROCEDURE — 6370000000 HC RX 637 (ALT 250 FOR IP): Performed by: HOSPITALIST

## 2022-12-05 PROCEDURE — 6360000002 HC RX W HCPCS: Performed by: PHYSICIAN ASSISTANT

## 2022-12-05 PROCEDURE — 94640 AIRWAY INHALATION TREATMENT: CPT

## 2022-12-05 PROCEDURE — 2580000003 HC RX 258: Performed by: INTERNAL MEDICINE

## 2022-12-05 PROCEDURE — 6360000002 HC RX W HCPCS: Performed by: INTERNAL MEDICINE

## 2022-12-05 PROCEDURE — 99223 1ST HOSP IP/OBS HIGH 75: CPT | Performed by: NURSE PRACTITIONER

## 2022-12-05 PROCEDURE — 99223 1ST HOSP IP/OBS HIGH 75: CPT | Performed by: INTERNAL MEDICINE

## 2022-12-05 PROCEDURE — 6370000000 HC RX 637 (ALT 250 FOR IP): Performed by: PHYSICIAN ASSISTANT

## 2022-12-05 PROCEDURE — 36415 COLL VENOUS BLD VENIPUNCTURE: CPT

## 2022-12-05 PROCEDURE — 80048 BASIC METABOLIC PNL TOTAL CA: CPT

## 2022-12-05 PROCEDURE — 99232 SBSQ HOSP IP/OBS MODERATE 35: CPT | Performed by: HOSPITALIST

## 2022-12-05 RX ORDER — TORSEMIDE 100 MG/1
100 TABLET ORAL DAILY
Status: DISCONTINUED | OUTPATIENT
Start: 2022-12-06 | End: 2022-12-07 | Stop reason: HOSPADM

## 2022-12-05 RX ADMIN — TORSEMIDE 50 MG: 20 TABLET ORAL at 18:50

## 2022-12-05 RX ADMIN — CEFEPIME 2000 MG: 2 INJECTION, POWDER, FOR SOLUTION INTRAVENOUS at 12:05

## 2022-12-05 RX ADMIN — ALLOPURINOL 200 MG: 100 TABLET ORAL at 08:44

## 2022-12-05 RX ADMIN — HEPARIN SODIUM 5000 UNITS: 5000 INJECTION INTRAVENOUS; SUBCUTANEOUS at 14:29

## 2022-12-05 RX ADMIN — LEVOTHYROXINE SODIUM 25 MCG: 0.03 TABLET ORAL at 08:44

## 2022-12-05 RX ADMIN — SODIUM CHLORIDE, PRESERVATIVE FREE 10 ML: 5 INJECTION INTRAVENOUS at 20:16

## 2022-12-05 RX ADMIN — CEFEPIME 1000 MG: 1 INJECTION, POWDER, FOR SOLUTION INTRAMUSCULAR; INTRAVENOUS at 00:11

## 2022-12-05 RX ADMIN — POLYETHYLENE GLYCOL 3350 17 G: 17 POWDER, FOR SOLUTION ORAL at 08:44

## 2022-12-05 RX ADMIN — SODIUM CHLORIDE, PRESERVATIVE FREE 10 ML: 5 INJECTION INTRAVENOUS at 00:12

## 2022-12-05 RX ADMIN — TAMSULOSIN HYDROCHLORIDE 0.4 MG: 0.4 CAPSULE ORAL at 08:44

## 2022-12-05 RX ADMIN — SODIUM CHLORIDE 25 ML: 9 INJECTION, SOLUTION INTRAVENOUS at 00:09

## 2022-12-05 RX ADMIN — CARVEDILOL 25 MG: 25 TABLET, FILM COATED ORAL at 08:44

## 2022-12-05 RX ADMIN — SODIUM CHLORIDE, PRESERVATIVE FREE 10 ML: 5 INJECTION INTRAVENOUS at 08:51

## 2022-12-05 RX ADMIN — AZITHROMYCIN MONOHYDRATE 500 MG: 250 TABLET ORAL at 00:17

## 2022-12-05 RX ADMIN — ATORVASTATIN CALCIUM 10 MG: 20 TABLET, FILM COATED ORAL at 08:44

## 2022-12-05 RX ADMIN — HEPARIN SODIUM 5000 UNITS: 5000 INJECTION INTRAVENOUS; SUBCUTANEOUS at 05:48

## 2022-12-05 RX ADMIN — IPRATROPIUM BROMIDE AND ALBUTEROL SULFATE 1 AMPULE: 2.5; .5 SOLUTION RESPIRATORY (INHALATION) at 20:42

## 2022-12-05 RX ADMIN — HEPARIN SODIUM 5000 UNITS: 5000 INJECTION INTRAVENOUS; SUBCUTANEOUS at 23:01

## 2022-12-05 RX ADMIN — CEFTRIAXONE 2000 MG: 2 INJECTION, POWDER, FOR SOLUTION INTRAMUSCULAR; INTRAVENOUS at 22:59

## 2022-12-05 ASSESSMENT — ENCOUNTER SYMPTOMS
GASTROINTESTINAL NEGATIVE: 1
NAUSEA: 0
RESPIRATORY NEGATIVE: 1
EYES NEGATIVE: 1

## 2022-12-05 ASSESSMENT — PAIN SCALES - WONG BAKER: WONGBAKER_NUMERICALRESPONSE: 0

## 2022-12-05 NOTE — PROGRESS NOTES
Pharmacy Note - Extended Infusion Beta-Lactam Adjustment    Cefepime ordered for treatment of Citrobacter bacteremia. Per [facility] Extended Infusion Beta-Lactam Policy, Cefepime 1091VK IV EI q12h will be changed to 2000 mg IV EI q12h. Estimated Creatinine Clearance: Estimated Creatinine Clearance: 38 mL/min (A) (based on SCr of 1.6 mg/dL (H)). Dialysis Status, MARY ANN, CKD: MARY ANN  BMI: Body mass index is 40.92 kg/m². Rationale for Adjustment: Agent is renally eliminated and demonstrates time-dependent effect on bacterial eradication. Extended-infusion dosing strategy aims to enhance microbiologic and clinical efficacy. Pharmacy will continue to monitor renal function and adjust dose as necessary. Please call with any questions.     Anell Najjar, PharmD, BCPS  Wireless: [id number]   12/5/2022 11:28 AM

## 2022-12-05 NOTE — PROGRESS NOTES
[address] Daily Progress Note      Admit Date:  12/1/2022    Subjective:  Mr. Chepe Barone was seen and examined. F/U CHFd/Hypoxia/pul HTN. No complaints this am. Says breathing is better. Denies chest pain.      Objective:   BP (!) 153/73   Pulse 60   Temp 97.6 °F (36.4 °C) (Oral)   Resp 16   Ht 5' 6\" (1.676 m)   Wt 253 lb 8.5 oz (115 kg)   SpO2 95%   BMI 40.92 kg/m²     Intake/Output Summary (Last 24 hours) at 12/5/2022 1055  Last data filed at 12/5/2022 1049  Gross per 24 hour   Intake 1773.26 ml   Output 1450 ml   Net 323.26 ml       TELEMETRY: Sinus     Physical Exam:  General:  Awake, alert, NAD  Skin:  Warm and dry  Neck:  JVP difficult  Chest:  decreased BS, respiration normal  Cardiovascular:  RRR S1S2  Abdomen:  Soft nontender   Extremities:  no edema    Medications:    polyethylene glycol  17 g Oral BID    ipratropium-albuterol  1 ampule Inhalation BID    allopurinol  200 mg Oral Daily    carvedilol  25 mg Oral BID    levothyroxine  25 mcg Oral Daily    atorvastatin  10 mg Oral Daily    tamsulosin  0.4 mg Oral Daily    sodium chloride flush  5-40 mL IntraVENous 2 times per day    heparin (porcine)  5,000 Units SubCUTAneous 3 times per day    insulin lispro  0-4 Units SubCUTAneous TID WC    insulin lispro  0-4 Units SubCUTAneous Nightly    cefepime  1,000 mg IntraVENous Q12H      sodium chloride Stopped (12/05/22 0535)    dextrose       perflutren lipid microspheres, sodium chloride flush, sodium chloride, ondansetron **OR** ondansetron, acetaminophen **OR** acetaminophen, glucose, dextrose bolus **OR** dextrose bolus, glucagon (rDNA), dextrose, albuterol    Lab Data:  CBC:   Recent Labs     12/03/22 0541 12/04/22  0616 12/05/22  0503   WBC 11.1* 8.9 6.4   HGB 10.2* 10.0* 10.7*   HCT 32.5* 31.1* 33.7*   MCV 86.8 86.2 86.1    149 166     BMP:   Recent Labs     12/03/22 0541 12/04/22  0616 12/05/22  0503    139 139   K 4.3 3.9 4.1    102 102   CO2 27 29 31   BUN 40* 41* 32* CREATININE 2.1* 1.9* 1.6*     LIVER PROFILE: No results for input(s): AST, ALT, LIPASE, BILIDIR, BILITOT, ALKPHOS in the last 72 hours. Invalid input(s): AMYLASE,  ALB  PT/INR: No results for input(s): PROTIME, INR in the last 72 hours. APTT: No results for input(s): APTT in the last 72 hours. BNP:  No results for input(s): BNP in the last 72 hours.   IMAGING:     Assessment:  Patient Active Problem List    Diagnosis Date Noted    Diabetes mellitus type 2 in obese ([facility] 08/05/2016    Essential hypertension 11/03/2015    Anemia 04/09/2012    Chronic kidney disease, stage III (moderate) ([facility]     Hyperlipidemia     Enterococcal bacteremia 12/04/2022    Junctional rhythm 12/03/2022    Syncope and collapse 12/03/2022    Acute respiratory failure, unsp w hypoxia or hypercapnia (HCC) 12/02/2022    Acute respiratory failure with hypoxia and hypercapnia (HCC) 12/02/2022    Pneumonia due to infectious organism 12/02/2022    RVF (right ventricular failure) ([facility] 12/02/2022    Hypervolemia 12/02/2022    Hypercalcemia 08/24/2017    Chronic gout without tophus 11/03/2015    Elevated PSA 05/07/2015    Proteinuria 09/27/2010    Hepatic cyst 02/18/2016    Coronary artery calcification seen on CT scan 02/18/2016    Pulmonary nodules 02/04/2016    Benign non-nodular prostatic hyperplasia with lower urinary tract symptoms 11/03/2015    Gastroesophageal reflux disease without esophagitis 11/03/2015    Bell's palsy 44/46/4397    Diastolic dysfunction 57/13/8906    Dyspnea on exertion 02/07/2014    Insomnia 06/29/2010    Obstructive sleep apnea     Antineutrophil cytoplasmic antibody (ANCA) positive 08/21/2017    Vitamin D deficiency 08/21/2017    Venous stasis dermatitis of both lower extremities 08/09/2017    Acute renal failure superimposed on stage 4 chronic kidney disease ([facility]     Pulmonary emphysema ([facility] 02/08/2017    Primary osteoarthritis of right knee 11/08/2016    Primary osteoarthritis of left knee 11/08/2016 Morbid obesity ([facility] 11/08/2016    Goiter, nontoxic, multinodular 08/05/2016    Difficulty walking 05/05/2016    Decreased hearing 09/16/2015    Hypoxia 08/06/2015    Bilateral leg weakness 05/08/2014    Osteoarthritis 05/08/2014    Fatigue 03/08/2013       Plan:  Denies chest pain/sob. Lasix on hold. Cr continues to improve. Down to 1 liter O2. Will need maintenance diuretic. EP seeing for junctional rhythm. He remains on coreg.        Core Measures:  Discharge instructions:   LVEF documented:   ACEI for LV dysfunction:   Smoking Cessation:    Ayaz Escamilla MD, MD 12/5/2022 10:55 AM

## 2022-12-05 NOTE — CONSULTS
[address]   Electrophysiology Nurse Practitioner  Consult Note    Date: 12/5/2022    Reason for Consultation/ Chief Complaint: junctional rhythm    Patient interviewed, examined and pertinent medical data and imaging studies reviewed. Refer to the NP's note for details of clinical findings, assessment and plan with which I agree. Corrections and additions as noted:     Patient admitted for shortness of breath and found to have HFpEF  During the rapid response, telemetry shows slowing of the sinus node, accompanied by prolongation of TN interval, followed by stable junctional rhythm at 40 to 45 bpm  This is more consistent with intense vagal response  In addition to this, patient was also continued on Coreg 25 mg p.o. twice daily    Plan:  Discontinued carvedilol (patient is on carvedilol for history of high blood pressure; no compelling indication for beta-blocker)  If needed further blood pressure management, we can use calcium channel blockers / vasodilators like hydralazine /if renal function is stable, can also use ACE inhibitors/ARB  Continue to monitor in telemetry  Secondary to more pronounced and global T wave inversion, will plan for a limited echocardiogram    Sierra Salinas MD   Cardiac Electrophysiology  [address] [facility] [phone]      History Obtained From: Patient and medical record. Cardiac Hx: Ross Rangel is a 80 y.o. man with a h/o HTN, HLD, DM, AVEL, GERD, obesity, CKD, HFpEF, [place] Palsy, who p/w SOB, edema, found to be hypoxic, CXR showed sm bilat pl effusions, trop max 0.05, echo showed EF 55-60%, grade I DD, sev RVH. HPI: Patient is being seen for junctional rhythm. Patient had originally presented with shortness of breath and swelling and found to have acute on chronic diastolic CHF. Patient is very hard of hearing and questions need to be written out for him to understand. His troponin max was 0.05. His BNP was 21,514.   On Friday 12 2 at 222 a rapid was called on the patient. The patient was being moved from chair to bed when he lost consciousness. Review of telemetry shows junctional arrhythmia at the same time of his syncopal event. He continued to have occasional junctional rhythm through the night while sleeping. On Saturday he did admit to episodes of dizziness and lightheadedness. There was no junctional arrhythmia overnight Saturday to Sunday and none Sunday to Monday. He currently has no dizziness, lightheadedness. He has no complaints of chest pain or shortness of breath. Being treated for pneumonia, on ATB. EKG done yesterday shows diffuse ST depression. Home medications:   No current facility-administered medications on file prior to encounter.      Current Outpatient Medications on File Prior to Encounter   Medication Sig Dispense Refill    simvastatin (ZOCOR) 20 MG tablet TAKE ONE TABLET BY MOUTH ONCE NIGHTLY 90 tablet 1    levothyroxine (SYNTHROID) 25 MCG tablet Take 1 tablet by mouth daily 90 tablet 1    ACCU-CHEK MEAGAN PLUS strip CHECK BLOOD SUGARS THREE TIMES A DAY BEFORE A MEAL 100 strip 1    torsemide (DEMADEX) 100 MG tablet Take 1 tablet (100 mg) in the morning and 1/2 tablet (50 mg) in the late afternoon 135 tablet 3    tamsulosin (FLOMAX) 0.4 MG capsule Take 1 capsule by mouth daily 90 capsule 3    carvedilol (COREG) 25 MG tablet Take 1 tablet by mouth 2 times daily 180 tablet 3    Accu-Chek Softclix Lancets MISC Check 3 times day before meals 100 each 3    allopurinol (ZYLOPRIM) 100 MG tablet TAKE TWO TABLETS BY MOUTH DAILY 180 tablet 3       Scheduled Meds:   polyethylene glycol  17 g Oral BID    ipratropium-albuterol  1 ampule Inhalation BID    allopurinol  200 mg Oral Daily    carvedilol  25 mg Oral BID    levothyroxine  25 mcg Oral Daily    atorvastatin  10 mg Oral Daily    tamsulosin  0.4 mg Oral Daily    sodium chloride flush  5-40 mL IntraVENous 2 times per day    heparin (porcine)  5,000 Units SubCUTAneous 3 times per day    insulin lispro  0-4 Units SubCUTAneous TID WC    insulin lispro  0-4 Units SubCUTAneous Nightly    cefepime  1,000 mg IntraVENous Q12H     Continuous Infusions:   sodium chloride Stopped (12/05/22 0535)    dextrose       PRN Meds:perflutren lipid microspheres, sodium chloride flush, sodium chloride, ondansetron **OR** ondansetron, acetaminophen **OR** acetaminophen, glucose, dextrose bolus **OR** dextrose bolus, glucagon (rDNA), dextrose, albuterol       Past Medical History:   Diagnosis Date    Anemia 4/9/2012    Antineutrophil cytoplasmic antibody (ANCA) positive 8/21/2017    Atelectasis of left lung 2/18/2016    Bell's palsy 7/24/2015    Left sided - July 2015    Benign non-nodular prostatic hyperplasia with lower urinary tract symptoms 11/3/2015    BPH (benign prostatic hypertrophy) 7/5/2011    Chronic gout without tophus 11/3/2015    Chronic kidney disease, stage III (moderate) (HCC)     Chronic pain of both knees 11/8/2016    Coronary artery calcification seen on CT scan 2/18/2016    Diabetes mellitus, type 2 (HCC)     Diastolic dysfunction 3/9/7778    Dyspnea on exertion 2/7/2014    Edema 1/8/2013    Elevated PSA 5/7/2015    Essential hypertension 11/3/2015    Gastroesophageal reflux disease without esophagitis 11/3/2015    GERD (gastroesophageal reflux disease) 7/5/2011    Gout 9/22/2011    Hepatic cyst 2/18/2016    Hypercalcemia 8/24/2017    Hyperlipidemia     Hypertension     Insomnia 6/29/2010    Localized edema 9/20/2016    Morbid obesity due to excess calories ([address] 11/8/2016    Multiple thyroid nodules 2/18/2016    Nocturia 5/7/2015    Obesity     Obstructive sleep apnea     Primary osteoarthritis of left knee 11/8/2016    Primary osteoarthritis of right knee 11/8/2016    Proteinuria 9/27/2010    Pulmonary emphysema ([address] 2/8/2017    Pulmonary nodules 2/4/2016    Shortness of breath 2/4/2016    Type 2 diabetes mellitus with diabetic chronic kidney disease ([address] 11/3/2015 Type 2 diabetes mellitus with stage 3 chronic kidney disease ([facility] 2/4/2016    Type 2 diabetes mellitus with stage 3 chronic kidney disease, without long-term current use of insulin ([facility] 8/5/2016    Vitamin D deficiency 8/21/2017        Past Surgical History:   Procedure Laterality Date    APPENDECTOMY      COLONOSCOPY      Dr. Una Hooker FLX DX W/COLLJ Bertha 1978 PFRMD N/A 11/6/2018    COLONOSCOPY WITH MAC performed by Siria Fraser MD at [address] [facility] [address]      left sided - August 2000    TURP      August 8056    UMBILICAL HERNIA REPAIR      August 2000    UPPER GASTROINTESTINAL ENDOSCOPY N/A 11/6/2018    EGD BIOPSY Forcep biopsy of gastric R/O H. Pylori performed by Siria Fraser MD at [facility] ENDOSCOPY       No Known Allergies    Social History:  Reviewed. reports that he has quit smoking. His smoking use included cigarettes. He has never used smokeless tobacco. He reports that he does not drink alcohol and does not use drugs. Family History:  Reviewed. family history includes Cancer in his sister; Heart Attack in his mother; Heart Disease in his mother; Seizures in his brother; Stroke in his brother; Substance Abuse in his sister. Review of System:    Constitutional: No fevers, chills. Eyes: No visual changes or diplopia. No scleral icterus. ENT: No Headaches. No mouth sores or sore throat. Cardiovascular: No for chest pain, No for dyspnea on exertion, No for palpitations or Yes for loss of consciousness. No cough, hemoptysis, No for pleuritic pain, or phlebitis. Respiratory: No for cough or wheezing. No hematemesis. Gastrointestinal: No abdominal pain, blood in stools. Musculoskeletal: No gait disturbance,    Integumentary: No rash or pruritis. Neurological: No headache, change in muscle strength, numbness or tingling. Psychiatric: No anxiety, or depression.     Physical Examination:  Vitals:    12/05/22 0820   BP: (!) 153/73   Pulse: 60   Resp: 16   Temp: 97.6 °F (36.4 °C)   SpO2: 95%      In: 1773.3 [P.O.:1560; I.V.:27.4]  Out: 1450    Wt Readings from Last 3 Encounters:   22 253 lb 8.5 oz (115 kg)   22 279 lb (126.6 kg)   22 279 lb (126.6 kg)     Temp  Av.8 °F (36.6 °C)  Min: 97.5 °F (36.4 °C)  Max: 98.2 °F (36.8 °C)  Pulse  Av  Min: 55  Max: 64  BP  Min: 110/64  Max: 153/73  SpO2  Av.9 %  Min: 92 %  Max: 95 %    Intake/Output Summary (Last 24 hours) at 2022 0935  Last data filed at 2022 0559  Gross per 24 hour   Intake 1533.26 ml   Output 1450 ml   Net 83.26 ml       Constitutional: Oriented. No distress. Head: Normocephalic and atraumatic. Mouth/Throat: Oropharynx is clear and moist.   Eyes: Conjunctivae clear without jaunduice. PERRL. Neck: Neck supple. No rigidity. No JVD present. Cardiovascular: Normal rate, regular rhythm, S1&S2. Pulmonary/Chest: Bilateral respiratory sounds. No wheezes, No rhonchi. Abdominal: Soft. Bowel sounds present. No distension, No tenderness. Musculoskeletal: No tenderness. No edema    Lymphadenopathy: Has no cervical adenopathy. Neurological: Alert and oriented. Cranial nerve appears intact, No Gross deficit   Skin: Skin is warm and dry. No rash noted. Psychiatric: Has a normal mood, affect and behavior     Labs:  Reviewed.    Recent Labs     22  0541 22  0616 22  0503    139 139   K 4.3 3.9 4.1    102 102   CO2 27 29 31   BUN 40* 41* 32*   CREATININE 2.1* 1.9* 1.6*     Recent Labs     22  0541 22  0616 22  0503   WBC 11.1* 8.9 6.4   HGB 10.2* 10.0* 10.7*   HCT 32.5* 31.1* 33.7*   MCV 86.8 86.2 86.1    149 166     Lab Results   Component Value Date/Time    TROPONINI 0.04 2022 05:41 AM     No results found for: BNP  No results found for: PROTIME, INR  Lab Results   Component Value Date/Time    CHOL 155 2022 11:44 AM    HDL 34 2022 11:44 AM    HDL 36 2012 02:50 PM    TRIG 127 07/11/2022 11:44 AM       Telemetry: Personally reviewed: NSR, SB, ST depression    Radiography: Personally reviewed: CXR   Bibasilar airspace disease with small bilateral pleural effusions. ECG: Personally reviewed: NSR, HR 61, , QRS 90, QTc 481, diffuse ST depression    ECHO: 12/2/2022  Summary   Definity contrast administered. Left ventricular cavity size is normal with mild LVH   Overall left ventricular systolic function appears normal.   Ejection fraction is estimated to be 55-60%. No regional wall motion abnormalities   Diastolic filling parameters suggest grade I diastolic dysfunction. There is systolic septal flattening consistent with right ventricular   pressure and volume overload. The right atrium is severely dilated. The right ventricle is severely enlarged. Right ventricular systolic function is normal.   Moderate to severe tricuspid regurgitation. Systolic pulmonary artery pressure (SPAP)   estimated at 90 mmHg (RA pressure 15 mmHg), consistent with severe pulmonary   hypertension.     Stress Test: N/A    Cardiac Angiography: N/A    Problem List:   Patient Active Problem List    Diagnosis Date Noted    Diabetes mellitus type 2 in obese ([address] 08/05/2016    Essential hypertension 11/03/2015    Anemia 04/09/2012    Chronic kidney disease, stage III (moderate) ([address]     Hyperlipidemia     Enterococcal bacteremia 12/04/2022    Junctional rhythm 12/03/2022    Syncope and collapse 12/03/2022    Acute respiratory failure, unsp w hypoxia or hypercapnia (HCC) 12/02/2022    Acute respiratory failure with hypoxia and hypercapnia (HCC) 12/02/2022    Pneumonia due to infectious organism 12/02/2022    RVF (right ventricular failure) ([address] 12/02/2022    Hypervolemia 12/02/2022    Hypercalcemia 08/24/2017    Chronic gout without tophus 11/03/2015    Elevated PSA 05/07/2015    Proteinuria 09/27/2010    Hepatic cyst 02/18/2016    Coronary artery calcification seen on CT scan 02/18/2016 Pulmonary nodules 02/04/2016    Benign non-nodular prostatic hyperplasia with lower urinary tract symptoms 11/03/2015    Gastroesophageal reflux disease without esophagitis 11/03/2015    Bell's palsy 70/08/5050    Diastolic dysfunction 04/96/9171    Dyspnea on exertion 02/07/2014    Insomnia 06/29/2010    Obstructive sleep apnea     Antineutrophil cytoplasmic antibody (ANCA) positive 08/21/2017    Vitamin D deficiency 08/21/2017    Venous stasis dermatitis of both lower extremities 08/09/2017    Acute renal failure superimposed on stage 4 chronic kidney disease ([facility]     Pulmonary emphysema ([facility] 02/08/2017    Primary osteoarthritis of right knee 11/08/2016    Primary osteoarthritis of left knee 11/08/2016    Morbid obesity ([facility] 11/08/2016    Goiter, nontoxic, multinodular 08/05/2016    Difficulty walking 05/05/2016    Decreased hearing 09/16/2015    Hypoxia 08/06/2015    Bilateral leg weakness 05/08/2014    Osteoarthritis 05/08/2014    Fatigue 03/08/2013        Assessment:     Junctional rhythm w/ s/s episode  Acute dCHF   Elevated trop  HTN  5. Diffuse ST depression     79 y/o man with a h/o HTN, HLD, DM, AVEL, GERD, obesity, CKD, HFpEF, [place] Palsy, who p/w SOB, edema, found to be hypoxic, CXR showed sm bilat pl effusions, trop max 0.05, echo showed EF 55-60%, grade I DD, sev RVH.       Junctional rhythm with s/s  - Episode on 12/2 - pt unresponsive w/ junctional rhythm on tele  - Thought to be a vagal episode  - Keep on tele  - 2 week Cam on d/c  - ECG ordered and results personally reviewed      Acute dCHF  - EF 55-60%, sev RVH  - CT chest showed BLLL atelectasis/consolidation, BL pl effusions, fluid volume overload, CM, extensive coronary calcifications  - NYHA class III  - QRS 90  - On carvedilol 25 mg BID  - Furosemide d/c'd - neg 2.1L   - Keep K+ > 4.0 and Mg > 2.0, Cr 1.9 - replacement ordered  - Daily weight, I&O  - Reviewed labs     Elevated trop  - No s/s  - On statin, BB  - Diffuse ST depressions on EKG  - Will check limited echo (reviewed with Dr. Daren Holt)  - Gen cards to follow     HTN  - BP controlled  - On nadolol 25 mg twice daily      No tobacco use. All questions and concerns were addressed to the patient/family. Alternatives to my treatment were discussed. The note was completed using EMR. Every effort was made to ensure accuracy; however, inadvertent computerized transcription errors may be present.      Alejandra Garcia [address] [facility]

## 2022-12-05 NOTE — CARE COORDINATION
CM continues to follow for DC planning and needs. Patient will need 2 week cam monitor at DC. Patient from home with daughter assistance. Patient continues to wean on O2, cardio and urology following. Patient may benefit from SNF admission, unless he has improved therapy evaluations. If patient unable to wean off O2 prior to DC, will need home O2 eval and home O2 DME order placed by ATTENDING MD, on day of DC. CM will continue to follow for DC planning and needs.     Thank you,  Jennifer Mckeon RN,   4th Floor Progressive Care Unit  [phone]

## 2022-12-05 NOTE — PLAN OF CARE
Problem: Discharge Planning  Goal: Discharge to home or other facility with appropriate resources  Outcome: Progressing  Note: Case Management has been consulted for D/C planning. Problem: Safety - Adult  Goal: Free from fall injury  Flowsheets (Taken 12/5/2022 0700)  Free From Fall Injury: Instruct family/caregiver on patient safety  Note: Identified as a lynnette fall risk. Fall precautions in place and bed alarm in use. Bed in locked and Position. SR up x3. Problem: Chronic Conditions and Co-morbidities  Goal: Patient's chronic conditions and co-morbidity symptoms are monitored and maintained or improved  Flowsheets (Taken 12/5/2022 0842)  Care Plan - Patient's Chronic Conditions and Co-Morbidity Symptoms are Monitored and Maintained or Improved: Monitor and assess patient's chronic conditions and comorbid symptoms for stability, deterioration, or improvement  Note: Monitoring blood sugars AC/HS. Lungs diminished. SpO2 92% on 3L/NC. SR/SB 1° on Tele.

## 2022-12-05 NOTE — PROGRESS NOTES
Progress Note    Admit Date: 12/1/2022  Day: 4  Diet: ADULT DIET; Regular; 4 carb choices (60 gm/meal); Low Sodium (2 gm)    CC: Shortness of breath    Interval history: Patient seen and examined at bedside this AM. Patient denies any acute pain including CP, abdominal pain/nausea. Patient reports having pain at his left knee. He denies any dizziness or headaches. HPI: Aldo Moore is a 80 y.o. male w/ PMHX of multiple chronic comorbidities including DM2, AVEL, HFpEF, CKD, HTN, HLD, GERD, obesity listed below who presents with a chief complaint of shortness of breath. On chart review it appears family felt patient was grunting and having what appeared to be abdominal pain and wanted him to be evaluated. On arrival EMS noted patient with sats 80% on room air and was placed on 6 L nasal cannula. Patient is apparently hard of hearing at baseline and unfortunately did not bring his hearing aids. In the ED vitals noted /107, heart rate 94, respirations 20, temp 98.8, O2 93% on 6 L nasal cannula. CBC noted leukocytosis with 14.1 with neutrophil predominance and low absolute lymphocyte count at 0.7, stable normocytic anemia at 11.1 g/dL and normal platelet count. BMP remarkable for BUN 31, creatinine 2.1 (baseline~1.7). Lactic acid 1.5. Troponin 0.04. EKG without any obvious ischemic changes. LFTs normal.  VBG with pH 7.30, PCO2 64.3, bicarb 31.6. CXR with bibasilar airspace disease with small bilateral pleural effusions. Head CT is pending at this time of evaluation. He was started on Rocephin and azithromycin in the ED. Unfortunately patient is not able to give much information in the ED due to not having his hearing aids. It is unclear if he is altered from his baseline or if it is due to him not being able to hear appropriately.   I attempted to reach out to family for collateral information including his son Padmini Dolan (called multiple times without answering) and his step daughter Stan Watts ( wrong number). Medications:     Scheduled Meds:   cefepime  2,000 mg IntraVENous Q12H    polyethylene glycol  17 g Oral BID    ipratropium-albuterol  1 ampule Inhalation BID    allopurinol  200 mg Oral Daily    levothyroxine  25 mcg Oral Daily    atorvastatin  10 mg Oral Daily    tamsulosin  0.4 mg Oral Daily    sodium chloride flush  5-40 mL IntraVENous 2 times per day    heparin (porcine)  5,000 Units SubCUTAneous 3 times per day    insulin lispro  0-4 Units SubCUTAneous TID WC    insulin lispro  0-4 Units SubCUTAneous Nightly     Continuous Infusions:   sodium chloride Stopped (12/05/22 0535)    dextrose       PRN Meds:perflutren lipid microspheres, sodium chloride flush, sodium chloride, ondansetron **OR** ondansetron, acetaminophen **OR** acetaminophen, glucose, dextrose bolus **OR** dextrose bolus, glucagon (rDNA), dextrose, albuterol    Objective:   Vitals:   T-max:  Patient Vitals for the past 8 hrs:   BP Temp Temp src Pulse Resp SpO2 Weight   12/05/22 1154 121/67 97.8 °F (36.6 °C) Oral 65 15 94 % --   12/05/22 0820 (!) 153/73 97.6 °F (36.4 °C) Oral 60 16 95 % --   12/05/22 0548 -- -- -- -- -- -- 253 lb 8.5 oz (115 kg)   12/05/22 0544 123/71 98.2 °F (36.8 °C) Oral 60 16 93 % --         Intake/Output Summary (Last 24 hours) at 12/5/2022 1216  Last data filed at 12/5/2022 1049  Gross per 24 hour   Intake 1433.26 ml   Output 1450 ml   Net -16.74 ml         Review of Systems   Constitutional: Negative. HENT: Negative. Eyes: Negative. Respiratory: Negative. Cardiovascular: Negative. Gastrointestinal: Negative. Negative for nausea. Genitourinary: Negative. Musculoskeletal: Negative. Skin: Negative. Neurological: Negative. Negative for dizziness and headaches. Psychiatric/Behavioral: Negative. Physical Exam  Vitals reviewed. Constitutional:       General: He is awake. Appearance: Normal appearance. He is obese. Interventions: Nasal cannula in place.    HENT: Head: Normocephalic and atraumatic. Eyes:      Extraocular Movements: Extraocular movements intact. Conjunctiva/sclera: Conjunctivae normal.      Pupils: Pupils are equal, round, and reactive to light. Cardiovascular:      Rate and Rhythm: Normal rate and regular rhythm. Heart sounds: Normal heart sounds. Pulmonary:      Effort: Pulmonary effort is normal.      Breath sounds: Rhonchi present. No wheezing or rales. Comments: Mild b/l rhonchi appreciated   Abdominal:      General: Abdomen is flat. Bowel sounds are normal.      Palpations: Abdomen is soft. Genitourinary:     Comments: Lemon catheter in place with good urine output   Musculoskeletal:         General: Normal range of motion. Skin:     General: Skin is warm and dry. Neurological:      General: No focal deficit present. Mental Status: He is alert and oriented to person, place, and time. Mental status is at baseline. Psychiatric:         Mood and Affect: Mood normal.         Behavior: Behavior normal. Behavior is cooperative. LABS:    CBC:   Recent Labs     12/03/22  0541 12/04/22  0616 12/05/22  0503   WBC 11.1* 8.9 6.4   HGB 10.2* 10.0* 10.7*   HCT 32.5* 31.1* 33.7*    149 166   MCV 86.8 86.2 86.1       Renal:    Recent Labs     12/03/22  0541 12/04/22  0616 12/05/22  0503    139 139   K 4.3 3.9 4.1    102 102   CO2 27 29 31   BUN 40* 41* 32*   CREATININE 2.1* 1.9* 1.6*   GLUCOSE 118* 130* 124*   CALCIUM 9.6 9.6 10.0   MG 2.50* 2.40  --    ANIONGAP 9 8 6       Hepatic:   No results for input(s): AST, ALT, BILITOT, BILIDIR, PROT, LABALBU, ALKPHOS in the last 72 hours. Troponin:   Recent Labs     12/02/22  1800 12/03/22  0004 12/03/22  0541   TROPONINI 0.05* 0.04* 0.04*       BNP: No results for input(s): BNP in the last 72 hours. Lipids: No results for input(s): CHOL, HDL in the last 72 hours.     Invalid input(s): LDLCALCU, TRIGLYCERIDE  ABGs:    Recent Labs     12/02/22  1409   PHART 7.295* BVV4VVO 52.2*   PO2ART 72.7*   GVC7HVC 25.4   BEART -1   [id number] 92*   [id number] 27         INR: No results for input(s): INR in the last 72 hours. Lactate: No results for input(s): LACTATE in the last 72 hours. Cultures:  -----------------------------------------------------------------  RAD:   CT HEAD WO CONTRAST   Final Result      No acute intracranial process. Chronic small vessel ischemic changes. EXAM: CT CHEST WITHOUT CONTRAST      INDICATION: Shortness of breath      COMPARISON: February 18, 2016      TECHNIQUE: CT of the chest was performed without contrast. Axial images, multiplanar reformatted images and axial maximum intensity projection images provided for review. Individualized dose optimization technique was used in order to meet ALARA    standards for radiation dose reduction. In addition to vendor specific dose reduction algorithms, the dose reduction techniques vary based on the specific scanner utilized but frequently include automated exposure control, adjustment of the mA and/or kV    according to patient size, and use of iterative reconstruction technique. CONTRAST: None. FINDINGS:      LUNGS AND AIRWAYS: Frothy mucus within the lower trachea. Bilateral lower lobe atelectasis/consolidation. Milder atelectasis of the posterior upper lobes. Mild smooth interlobular septal thickening particularly in the lung apices consistent with a    component of interstitial edema. PLEURA: Trace bilateral pleural effusions. HEART AND GREAT VESSELS: Cardiomegaly. Extensive coronary artery calcification. Thoracic aorta and main pulmonary artery normal in caliber. No pericardial effusion. ADENOPATHY/MEDIASTINUM: Mildly prominent mediastinal lymph nodes, favored to be reactive in nature given the associated airspace disease. CHEST WALL / LOWER NECK: No significant abnormality. UPPER ABDOMEN: Large at least 13 mm cyst again within the visualized liver.  Prior left nephrectomy with surgical clips. BONES: No significant abnormality. IMPRESSION:      Bilateral lower lobe atelectasis/consolidation. Associated trace bilateral pleural effusions. Mild smooth interlobular septal thickening suggestive of mild volume overload/interstitial edema. Cardiomegaly. Extensive coronary artery calcification. Hepatic cyst.      CT CHEST WO CONTRAST   Final Result      No acute intracranial process. Chronic small vessel ischemic changes. EXAM: CT CHEST WITHOUT CONTRAST      INDICATION: Shortness of breath      COMPARISON: February 18, 2016      TECHNIQUE: CT of the chest was performed without contrast. Axial images, multiplanar reformatted images and axial maximum intensity projection images provided for review. Individualized dose optimization technique was used in order to meet ALARA    standards for radiation dose reduction. In addition to vendor specific dose reduction algorithms, the dose reduction techniques vary based on the specific scanner utilized but frequently include automated exposure control, adjustment of the mA and/or kV    according to patient size, and use of iterative reconstruction technique. CONTRAST: None. FINDINGS:      LUNGS AND AIRWAYS: Frothy mucus within the lower trachea. Bilateral lower lobe atelectasis/consolidation. Milder atelectasis of the posterior upper lobes. Mild smooth interlobular septal thickening particularly in the lung apices consistent with a    component of interstitial edema. PLEURA: Trace bilateral pleural effusions. HEART AND GREAT VESSELS: Cardiomegaly. Extensive coronary artery calcification. Thoracic aorta and main pulmonary artery normal in caliber. No pericardial effusion. ADENOPATHY/MEDIASTINUM: Mildly prominent mediastinal lymph nodes, favored to be reactive in nature given the associated airspace disease.       CHEST WALL / LOWER NECK: No significant abnormality. UPPER ABDOMEN: Large at least 13 mm cyst again within the visualized liver. Prior left nephrectomy with surgical clips. BONES: No significant abnormality. IMPRESSION:      Bilateral lower lobe atelectasis/consolidation. Associated trace bilateral pleural effusions. Mild smooth interlobular septal thickening suggestive of mild volume overload/interstitial edema. Cardiomegaly. Extensive coronary artery calcification. Hepatic cyst.      XR CHEST PORTABLE   Final Result      1. Bibasilar airspace disease with small bilateral pleural effusions. Assessment/Plan:     Jamie Reyes is a 80 y.o. male w/ multiple chronic co morbidities p/w hypoxia and questionable AMS. Dx CAP. Treatment with IVF, Abx, O2     #Acute hypoxic hypercapnic respiratory failure (improving)  HFpEF exacerbation  Suspected CAP   Not on O2 at home. CXR with bibasilar airspace disease. VBG with pH 7.33 (initially 7.3), pCO2 53.7 mmHg (initially 64 mmHg). Pro-calcitonin 41.16 ng/mL. COVID and Influenza negative  - Continue Cefepime + Azithromycin  -Blood cultures growing Enterococcus, citrobacter. Switched ceftriaxone to cefepime. Will follow up on sensitivities   - Continue supp O2 to maintain O2 >92% and wean as possible  - Duonebs  - Lasix 60 mg BID IV on hold  -begin home torsemide  - Cardiology c/s, joselin recs  - Strict I&O, Daily weights  - optimize lytes ( Mag >2, K >4) and monitor with BMP     #Junctional Rhythm  W/episode of likely vagal syncope during hospitalization  -EP consulted  -on tele  -K >4, Mg > 2    #Enterobacter, citrobacter bacteremia  + on both Cxs.  - Stopped Rocephin (s/p 1 day. Started Cefepime (day 1)  -UA + w/cx pending   -ID consulted     #MARY ANN on CKD  Possibly 2/2 cardiorenal syndrome  (Baseline creatinine ~1.6)  -improvement in cr     #Troponinemia  - Trop 0.04(12/01/22), 0.05 (12/02/2022).  Stopped trending- patient asymptomatic.   - EKG with T wave inversions in inferior leads. 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 #DM2  - not on any medications. Last HgbA1c 7.2 ub 7/2022  - Hypoglycemia protocol  - LDSSI   -POCT [id number]      Chronic Conditions:  Hypothyroidism  - Continue home synthroid 25 mcg daily  BPH  - Continue home Flomax 0.4 mg daily  HTN  - Continue home Coreg 25 mg BID  HLD  - Continue Atorvastatin 10 mg daily         Code Status: Full code  FEN: Low-sodium diet, low card diet  PPX: Subcu heparin  DISPO: NAT Oneal MD PGY-1  12/05/22  12:16 PM    This patient has been staffed and discussed with Emilie Hooper MD.    Addendum to Resident H& P/Progress note:  I have personally seen,examined and evaluated the patient. I have reviewed the current history, physical findings, labs and assessment and plan and agree with note as documented by resident MD ( Fidenico Morin)  Recommendation of cardiology and EP specialists were reviewed.     Accucheck Glucoses:   Recent Labs     12/04/22  1140 12/04/22  1729 12/04/22  2115 12/05/22  0815 12/05/22  1153   POCGLU 127* 143* 134* 123* 197*        Emilie Hooper MD, [address]

## 2022-12-05 NOTE — PLAN OF CARE
Problem: Discharge Planning  Goal: Discharge to home or other facility with appropriate resources  12/5/2022 0940 by Aida Turcios RN  Outcome: Progressing  Flowsheets (Taken 12/5/2022 0940)  Discharge to home or other facility with appropriate resources:   Identify barriers to discharge with patient and caregiver   Arrange for needed discharge resources and transportation as appropriate   Identify discharge learning needs (meds, wound care, etc)     Problem: Skin/Tissue Integrity  Goal: Absence of new skin breakdown  Description: 1. Monitor for areas of redness and/or skin breakdown  2. Assess vascular access sites hourly  3. Every 4-6 hours minimum:  Change oxygen saturation probe site  4. Every 4-6 hours:  If on nasal continuous positive airway pressure, respiratory therapy assess nares and determine need for appliance change or resting period.   Outcome: Progressing     Problem: Safety - Adult  Goal: Free from fall injury  12/5/2022 0940 by Aida Turcios RN  Outcome: Progressing  Flowsheets (Taken 12/5/2022 0940)  Free From Fall Injury: Instruct family/caregiver on patient safety     Problem: ABCDS Injury Assessment  Goal: Absence of physical injury  Outcome: Progressing     Problem: Chronic Conditions and Co-morbidities  Goal: Patient's chronic conditions and co-morbidity symptoms are monitored and maintained or improved  12/5/2022 0940 by Aida Turcios RN  Outcome: Progressing  Flowsheets (Taken 12/5/2022 0940)  Care Plan - Patient's Chronic Conditions and Co-Morbidity Symptoms are Monitored and Maintained or Improved:   Monitor and assess patient's chronic conditions and comorbid symptoms for stability, deterioration, or improvement   Collaborate with multidisciplinary team to address chronic and comorbid conditions and prevent exacerbation or deterioration   Update acute care plan with appropriate goals if chronic or comorbid symptoms are exacerbated and prevent overall improvement and discharge

## 2022-12-06 ENCOUNTER — NURSE ONLY (OUTPATIENT)
Dept: CARDIOLOGY | Age: 87
End: 2022-12-06

## 2022-12-06 LAB
ANION GAP SERPL CALCULATED.3IONS-SCNC: 7 MMOL/L (ref 3–16)
BASOPHILS ABSOLUTE: 0 K/UL (ref 0–0.2)
BASOPHILS RELATIVE PERCENT: 0.7 %
BUN BLDV-MCNC: 26 MG/DL (ref 7–20)
CALCIUM SERPL-MCNC: 10.3 MG/DL (ref 8.3–10.6)
CHLORIDE BLD-SCNC: 103 MMOL/L (ref 99–110)
CO2: 32 MMOL/L (ref 21–32)
CREAT SERPL-MCNC: 1.5 MG/DL (ref 0.8–1.3)
EOSINOPHILS ABSOLUTE: 0.3 K/UL (ref 0–0.6)
EOSINOPHILS RELATIVE PERCENT: 5.5 %
GFR SERPL CREATININE-BSD FRML MDRD: 44 ML/MIN/{1.73_M2}
GLUCOSE BLD-MCNC: 115 MG/DL (ref 70–99)
GLUCOSE BLD-MCNC: 131 MG/DL (ref 70–99)
GLUCOSE BLD-MCNC: 135 MG/DL (ref 70–99)
GLUCOSE BLD-MCNC: 137 MG/DL (ref 70–99)
GLUCOSE BLD-MCNC: 149 MG/DL (ref 70–99)
HCT VFR BLD CALC: 36 % (ref 40.5–52.5)
HEMOGLOBIN: 11.2 G/DL (ref 13.5–17.5)
L. PNEUMOPHILA SEROGP 1 UR AG: NORMAL
LYMPHOCYTES ABSOLUTE: 1 K/UL (ref 1–5.1)
LYMPHOCYTES RELATIVE PERCENT: 16.2 %
MCH RBC QN AUTO: 27.1 PG (ref 26–34)
MCHC RBC AUTO-ENTMCNC: 31 G/DL (ref 31–36)
MCV RBC AUTO: 87.3 FL (ref 80–100)
MONOCYTES ABSOLUTE: 0.5 K/UL (ref 0–1.3)
MONOCYTES RELATIVE PERCENT: 8.5 %
NEUTROPHILS ABSOLUTE: 4.2 K/UL (ref 1.7–7.7)
NEUTROPHILS RELATIVE PERCENT: 69.1 %
PDW BLD-RTO: 15.9 % (ref 12.4–15.4)
PERFORMED ON: ABNORMAL
PLATELET # BLD: 199 K/UL (ref 135–450)
PMV BLD AUTO: 8.4 FL (ref 5–10.5)
POTASSIUM REFLEX MAGNESIUM: 4.2 MMOL/L (ref 3.5–5.1)
RBC # BLD: 4.13 M/UL (ref 4.2–5.9)
SODIUM BLD-SCNC: 142 MMOL/L (ref 136–145)
STREP PNEUMONIAE ANTIGEN, URINE: NORMAL
TROPONIN: 0.01 NG/ML
WBC # BLD: 6.1 K/UL (ref 4–11)

## 2022-12-06 PROCEDURE — 6370000000 HC RX 637 (ALT 250 FOR IP): Performed by: NURSE PRACTITIONER

## 2022-12-06 PROCEDURE — 80048 BASIC METABOLIC PNL TOTAL CA: CPT

## 2022-12-06 PROCEDURE — 93308 TTE F-UP OR LMTD: CPT

## 2022-12-06 PROCEDURE — 94761 N-INVAS EAR/PLS OXIMETRY MLT: CPT

## 2022-12-06 PROCEDURE — 6370000000 HC RX 637 (ALT 250 FOR IP)

## 2022-12-06 PROCEDURE — 6360000002 HC RX W HCPCS: Performed by: INTERNAL MEDICINE

## 2022-12-06 PROCEDURE — 2580000003 HC RX 258: Performed by: PHYSICIAN ASSISTANT

## 2022-12-06 PROCEDURE — 36415 COLL VENOUS BLD VENIPUNCTURE: CPT

## 2022-12-06 PROCEDURE — 99232 SBSQ HOSP IP/OBS MODERATE 35: CPT | Performed by: INTERNAL MEDICINE

## 2022-12-06 PROCEDURE — 6370000000 HC RX 637 (ALT 250 FOR IP): Performed by: PHYSICIAN ASSISTANT

## 2022-12-06 PROCEDURE — 6360000002 HC RX W HCPCS: Performed by: PHYSICIAN ASSISTANT

## 2022-12-06 PROCEDURE — 99232 SBSQ HOSP IP/OBS MODERATE 35: CPT | Performed by: HOSPITALIST

## 2022-12-06 PROCEDURE — 2580000003 HC RX 258: Performed by: INTERNAL MEDICINE

## 2022-12-06 PROCEDURE — 84484 ASSAY OF TROPONIN QUANT: CPT

## 2022-12-06 PROCEDURE — 99233 SBSQ HOSP IP/OBS HIGH 50: CPT | Performed by: INTERNAL MEDICINE

## 2022-12-06 PROCEDURE — 2060000000 HC ICU INTERMEDIATE R&B

## 2022-12-06 PROCEDURE — 99233 SBSQ HOSP IP/OBS HIGH 50: CPT | Performed by: NURSE PRACTITIONER

## 2022-12-06 PROCEDURE — 94640 AIRWAY INHALATION TREATMENT: CPT

## 2022-12-06 PROCEDURE — 85025 COMPLETE CBC W/AUTO DIFF WBC: CPT

## 2022-12-06 PROCEDURE — 2700000000 HC OXYGEN THERAPY PER DAY

## 2022-12-06 PROCEDURE — 6370000000 HC RX 637 (ALT 250 FOR IP): Performed by: HOSPITALIST

## 2022-12-06 RX ORDER — AMLODIPINE BESYLATE 5 MG/1
5 TABLET ORAL DAILY
Status: DISCONTINUED | OUTPATIENT
Start: 2022-12-06 | End: 2022-12-07 | Stop reason: HOSPADM

## 2022-12-06 RX ORDER — POLYETHYLENE GLYCOL 3350 17 G/17G
17 POWDER, FOR SOLUTION ORAL DAILY
Status: CANCELLED | OUTPATIENT
Start: 2022-12-06

## 2022-12-06 RX ORDER — ALPRAZOLAM 0.25 MG/1
0.25 TABLET ORAL 3 TIMES DAILY PRN
Status: DISCONTINUED | OUTPATIENT
Start: 2022-12-06 | End: 2022-12-06

## 2022-12-06 RX ORDER — POLYETHYLENE GLYCOL 3350 17 G/17G
17 POWDER, FOR SOLUTION ORAL ONCE
Status: CANCELLED | OUTPATIENT
Start: 2022-12-06

## 2022-12-06 RX ORDER — POLYETHYLENE GLYCOL 3350 17 G/17G
17 POWDER, FOR SOLUTION ORAL DAILY
Status: DISCONTINUED | OUTPATIENT
Start: 2022-12-06 | End: 2022-12-07 | Stop reason: HOSPADM

## 2022-12-06 RX ADMIN — TORSEMIDE 100 MG: 100 TABLET ORAL at 09:03

## 2022-12-06 RX ADMIN — HEPARIN SODIUM 5000 UNITS: 5000 INJECTION INTRAVENOUS; SUBCUTANEOUS at 05:02

## 2022-12-06 RX ADMIN — LEVOTHYROXINE SODIUM 25 MCG: 0.03 TABLET ORAL at 09:03

## 2022-12-06 RX ADMIN — IPRATROPIUM BROMIDE AND ALBUTEROL SULFATE 1 AMPULE: 2.5; .5 SOLUTION RESPIRATORY (INHALATION) at 21:41

## 2022-12-06 RX ADMIN — IPRATROPIUM BROMIDE AND ALBUTEROL SULFATE 1 AMPULE: 2.5; .5 SOLUTION RESPIRATORY (INHALATION) at 07:49

## 2022-12-06 RX ADMIN — ATORVASTATIN CALCIUM 10 MG: 20 TABLET, FILM COATED ORAL at 09:03

## 2022-12-06 RX ADMIN — SODIUM CHLORIDE, PRESERVATIVE FREE 10 ML: 5 INJECTION INTRAVENOUS at 09:03

## 2022-12-06 RX ADMIN — HEPARIN SODIUM 5000 UNITS: 5000 INJECTION INTRAVENOUS; SUBCUTANEOUS at 23:00

## 2022-12-06 RX ADMIN — HEPARIN SODIUM 5000 UNITS: 5000 INJECTION INTRAVENOUS; SUBCUTANEOUS at 16:03

## 2022-12-06 RX ADMIN — CEFTRIAXONE 2000 MG: 2 INJECTION, POWDER, FOR SOLUTION INTRAMUSCULAR; INTRAVENOUS at 21:00

## 2022-12-06 RX ADMIN — AMLODIPINE BESYLATE 5 MG: 5 TABLET ORAL at 16:02

## 2022-12-06 RX ADMIN — ALLOPURINOL 200 MG: 100 TABLET ORAL at 09:03

## 2022-12-06 RX ADMIN — TORSEMIDE 50 MG: 20 TABLET ORAL at 16:02

## 2022-12-06 RX ADMIN — SODIUM CHLORIDE, PRESERVATIVE FREE 10 ML: 5 INJECTION INTRAVENOUS at 20:54

## 2022-12-06 RX ADMIN — TAMSULOSIN HYDROCHLORIDE 0.4 MG: 0.4 CAPSULE ORAL at 09:03

## 2022-12-06 ASSESSMENT — ENCOUNTER SYMPTOMS
GASTROINTESTINAL NEGATIVE: 1
NAUSEA: 0
RESPIRATORY NEGATIVE: 1
EYES NEGATIVE: 1

## 2022-12-06 NOTE — CONSULTS
Infectious Diseases Inpatient Consult Note    Reason for Consult:   GNR bacteremia  Requesting Physician:   Dr Sylvain Hillman  Primary Care Physician:  Hector Peraza MD  History Obtained From:   Pt, EPIC    Admit Date: 12/1/2022  Hospital Day: 11    CHIEF COMPLAINT:       Chief Complaint   Patient presents with    Shortness of Breath     Sats 80's on RA per EMS // placed onto 6L NC    Altered Mental Status    Fatigue       HISTORY OF PRESENT ILLNESS:      81 yo man   Hx DM, CHF, CKD, obesity (BMI 40), JARRETT, HL    Presented with SOB  Poor historian (very hard of hearing limiting ED, admitting and my history collection)    In ED 12/1, afeb, WBC 14.1, required 6L O2  CXR basilar airspace changes  Admit, started on Ceftriaxone + Azithromycin  Dx CHF, diuresis  Urinary retention, had  placed holliday    On 12/2 - Tm 102.4  BC sent - 2 sets pos GNR - Citrobacter koseri    Today 12/5, afeb, WBC 6.4  Feels good, no complaint    Past Medical History:    Past Medical History:   Diagnosis Date    Anemia 4/9/2012    Antineutrophil cytoplasmic antibody (ANCA) positive 8/21/2017    Atelectasis of left lung 2/18/2016    Bell's palsy 7/24/2015    Left sided - July 2015    Benign non-nodular prostatic hyperplasia with lower urinary tract symptoms 11/3/2015    BPH (benign prostatic hypertrophy) 7/5/2011    Chronic gout without tophus 11/3/2015    Chronic kidney disease, stage III (moderate) (HCC)     Chronic pain of both knees 11/8/2016    Coronary artery calcification seen on CT scan 2/18/2016    Diabetes mellitus, type 2 ([address]     Diastolic dysfunction 6/1/6224    Dyspnea on exertion 2/7/2014    Edema 1/8/2013    Elevated PSA 5/7/2015    Essential hypertension 11/3/2015    Gastroesophageal reflux disease without esophagitis 11/3/2015    GERD (gastroesophageal reflux disease) 7/5/2011    Gout 9/22/2011    Hepatic cyst 2/18/2016    Hypercalcemia 8/24/2017    Hyperlipidemia     Hypertension     Insomnia 6/29/2010    Localized edema 9/20/2016 Morbid obesity due to excess calories ([facility] 11/8/2016    Multiple thyroid nodules 2/18/2016    Nocturia 5/7/2015    Obesity     Obstructive sleep apnea     Primary osteoarthritis of left knee 11/8/2016    Primary osteoarthritis of right knee 11/8/2016    Proteinuria 9/27/2010    P[facility] emph[facility]ma [facility] 2/8/2017    Pulmonary nodules 2/4/2016    Shortness of breath 2/4/2016    Type 2 diabetes mellitus with diabetic chronic kidney disease ([facility] 11/3/2015    Type 2 diabetes mellitus with stage 3 chronic kidney disease ([facility] 2/4/2016    Type 2 diabetes mellitus with stage 3 chronic kidney disease, without long-term current use of insulin ([facility] 8/5/2016    Vitamin D deficiency 8/21/2017       Past Surgical History:    Past Surgical History:   Procedure Laterality Date    APPENDECTOMY      COLONOSCOPY      Dr. Ellis Tuttle FLX DX W/COLLJ Bertha 1978 PFRMD N/A 11/6/2018    COLONOSCOPY WITH MAC performed by Micha Cortez MD at [address] [facility]      left sided - August 2000    TURP      August 7031    UMBILICAL HERNIA REPAIR      August 2000    UPPER GASTROINTESTINAL ENDOSCOPY N/A 11/6/2018    EGD BIOPSY Forcep biopsy of gastric R/O H. Pylori performed by Micha Cortez MD at [address] ENDOSCOPY       Current Medications:     cefepime  2,000 mg IntraVENous Q12H    [START ON 12/6/2022] torsemide  100 mg Oral Daily    [START ON 12/6/2022] torsemide  50 mg Oral Daily    polyethylene glycol  17 g Oral BID    ipratropium-albuterol  1 ampule Inhalation BID    allopurinol  200 mg Oral Daily    levothyroxine  25 mcg Oral Daily    atorvastatin  10 mg Oral Daily    tamsulosin  0.4 mg Oral Daily    sodium chloride flush  5-40 mL IntraVENous 2 times per day    heparin (porcine)  5,000 Units SubCUTAneous 3 times per day    insulin lispro  0-4 Units SubCUTAneous TID WC    insulin lispro  0-4 Units SubCUTAneous Nightly       Allergies:  Patient has no known allergies.     Social History: TOBACCO:    None   ETOH:    None   DRUGS:   None   MARITAL STATUS:      OCCUPATION:   Retired     Family History:   No immunodeficiency    REVIEW OF SYSTEMS:    No fever / chills / sweats. No weight loss. No visual change, eye pain, eye discharge. No oral lesion, sore throat, dysphagia. Denies cough / sputum. Denies chest pain, palpitations. Denies n / v / abd pain. No diarrhea. Denies dysuria or change in urinary function. Denies joint swelling or pain. No myalgia, arthralgia. Denies skin changes, itching  Denies focal weakness, sensory change or other neurologic symptom    Denies new / worse depression, psychiatric symptoms    PHYSICAL EXAM:      Vitals:    BP (!) 149/88   Pulse 70   Temp 98 °F (36.7 °C) (Oral)   Resp 16   Ht 5' 6\" (1.676 m)   Wt 253 lb 8.5 oz (115 kg)   SpO2 92%   BMI 40.92 kg/m²     GENERAL: No apparent distress.   3L  HEENT: Membranes moist, no oral lesion, PERRL  NECK:  Supple, no lymphadenopathy  LUNGS: Clear b/l, no rales, no dullness  CARDIAC: RRR, no murmur appreciated  ABD:  + BS, soft / NT  EXT:  No rash, no edema, no lesions  NEURO: No focal neurologic findings  PSYCH: Orientation, sensorium, mood normal  LINES:  Peripheral iv  Lemon with clear urine in bag    DATA:    Lab Results   Component Value Date    WBC 6.4 12/05/2022    HGB 10.7 (L) 12/05/2022    HCT 33.7 (L) 12/05/2022    MCV 86.1 12/05/2022     12/05/2022     Lab Results   Component Value Date    CREATININE 1.6 (H) 12/05/2022    BUN 32 (H) 12/05/2022     12/05/2022    K 4.1 12/05/2022     12/05/2022    CO2 31 12/05/2022       Hepatic Function Panel:   Lab Results   Component Value Date/Time    ALKPHOS 64 12/01/2022 10:23 PM    ALT <5 12/01/2022 10:23 PM    AST 12 12/01/2022 10:23 PM    PROT 8.1 12/01/2022 10:23 PM    PROT 8.6 10/08/2012 03:22 PM    BILITOT 0.8 12/01/2022 10:23 PM    BILIDIR <0.2 12/01/2022 10:23 PM    IBILI see below 12/01/2022 10:23 PM    LABALBU 4.1 12/01/2022 10:23 PM       Micro:   BC x 2 C koseri  Citrobacter koseri (2)  Antibiotic Interpretation Microscan    amoxicillin-clavulanate Resistant <=8/4 mcg/mL   ampicillin Resistant >16 mcg/mL   ampicillin-sulbactam Sensitive <=8/4 mcg/mL   ceFAZolin Sensitive <=2 mcg/mL   cefepime Sensitive <=2 mcg/mL   cefTRIAXone Sensitive <=1 mcg/mL   cefuroxime Sensitive 8 mcg/mL   ciprofloxacin Sensitive <=1 mcg/mL   ertapenem Sensitive <=0.5 mcg/mL   gentamicin Sensitive <=4 mcg/mL   meropenem Sensitive <=1 mcg/mL   piperacillin-tazobactam Sensitive <=16 mcg/mL   trimethoprim-sulfamethoxazole Sensitive <=2/38 mcg/mL     Imagin/2 Chest CT  Bilateral lower lobe atelectasis/consolidation. Associated trace bilateral pleural effusions. Mild smooth interlobular septal thickening suggestive of mild volume overload/interstitial edema. Cardiomegaly. Extensive coronary artery calcification.    Hepatic cyst.      IMPRESSION:      Patient Active Problem List   Diagnosis    Obstructive sleep apnea    Chronic kidney disease, stage III (moderate) ([facility])    Hyperlipidemia    Insomnia    Proteinuria    Anemia    Fatigue    Dyspnea on exertion    Bilateral leg weakness    Osteoarthritis    Diastolic dysfunction    Elevated PSA    Bell's palsy    Hypoxia    Decreased hearing    Chronic gout without tophus    Essential hypertension    Benign non-nodular prostatic hyperplasia with lower urinary tract symptoms    Gastroesophageal reflux disease without esophagitis    Pulmonary nodules    Hepatic cyst    Coronary artery calcification seen on CT scan    Difficulty walking    Diabetes mellitus type 2 in obese (HCC)    Goiter, nontoxic, multinodular    Primary osteoarthritis of right knee    Primary osteoarthritis of left knee    Morbid obesity (HCC)    Pulmonary emphysema (HCC)    Venous stasis dermatitis of both lower extremities    Acute diastolic CHF (congestive heart failure) (HCC)    Acute renal failure superimposed on stage 4 chronic kidney disease (HCC)    Antineutrophil cytoplasmic antibody (ANCA) positive    Vitamin D deficiency    Hypercalcemia    Acute respiratory failure, unsp w hypoxia or hypercapnia (HCC)    Acute respiratory failure with hypoxia and hypercapnia (HCC)    Pneumonia due to infectious organism    RVF (right ventricular failure) (HCC)    Hypervolemia    Junctional rhythm    Syncope and collapse    Enterococcal bacteremia       Hx DM, CHF, CKD, obesity (BMI 40), JARRETT, HL    CHF    Complicated UTI secondary to urinary retention  Citrobacter bacteremia,  source   - holliday placed    NKDA    RECOMMENDATIONS:    Change to Ceftriaxone   Holliday per  (recommended removal, check PVR)    Discharge on oral antibiotic - cephalosporin or fluoroquinolone    Medical Decision Making: The following items were considered in medical decision making:  Discussion of patient care with other providers  Reviewed clinical lab tests  Reviewed radiology tests  Reviewed other diagnostic tests/interventions  Microbiology cultures and other micro tests reviewed      Risk of Complications/Morbidity: High   Illness(es)/ Infection present that pose threat to bodily function. There is potential for severe exacerbation of infection/side effects of treatment.   Therapy requires intensive monitoring for antimicrobial agent toxicity    Discussed with pt  Teofilo Brooks MD

## 2022-12-06 NOTE — PROGRESS NOTES
Cardiology - PROGRESS NOTE    Admit Date: 12/1/2022     Chief Complaint: junctional rhythm, near syncopal event, acute on chronic dCHF     Interval History:   Patient seen and examined and notes reviewed. Patient is being followed for junctional rhythm, near syncopal event, acute on chronic dCHF. Patient presented to the hospital with complaints of shortness of breath and swelling. Patient is very hard of hearing and questions need to be written out for him to understand. His troponin max was 0.05. He states that he has had no chest discomfort. Patient states that he is at a facility and he is unable to ambulate. Patient had an episode 12/2/22 when the nurses were trying to put him back to bed after sitting up in the chair and he lost consciousness. Review of telemetry showed sinus slowing, accompanied by a prolongation of the PA iinterval and a stable junctional rhythm at that time. Noted to have occasional junctional rhythm overnight into Saturday. No junctional rhythm noted overnight into Sunday. Yesterday he admitted to episodes of dizziness and lightheadedness, none this am.  He has noted some swelling in his legs recently. He denies CP or SOB. BBB appearance on tele. Carvedilol d/c'd yesterday. Limited echo pending.     In: 200 [P.O.:720]  Out: 3200    Wt Readings from Last 2 Encounters:   12/06/22 247 lb 5.7 oz (112.2 kg)   07/26/22 279 lb (126.6 kg)       Data:   Scheduled Meds:   Scheduled Meds:   torsemide  100 mg Oral Daily    torsemide  50 mg Oral Daily    cefTRIAXone (ROCEPHIN) IV  2,000 mg IntraVENous Q24H    polyethylene glycol  17 g Oral BID    ipratropium-albuterol  1 ampule Inhalation BID    allopurinol  200 mg Oral Daily    levothyroxine  25 mcg Oral Daily    atorvastatin  10 mg Oral Daily    tamsulosin  0.4 mg Oral Daily    sodium chloride flush  5-40 mL IntraVENous 2 times per day    heparin (porcine)  5,000 Units SubCUTAneous 3 times per day    insulin lispro  0-4 Units SubCUTAneous TID WC    insulin lispro  0-4 Units SubCUTAneous Nightly     Continuous Infusions:   sodium chloride Stopped (12/05/22 0535)    dextrose       PRN Meds:.perflutren lipid microspheres, sodium chloride flush, sodium chloride, ondansetron **OR** ondansetron, acetaminophen **OR** acetaminophen, glucose, dextrose bolus **OR** dextrose bolus, glucagon (rDNA), dextrose, albuterol  Continuous Infusions:   sodium chloride Stopped (12/05/22 0535)    dextrose         Intake/Output Summary (Last 24 hours) at 12/6/2022 0806  Last data filed at 12/6/2022 0510  Gross per 24 hour   Intake 770 ml   Output 3200 ml   Net -2430 ml       CBC:   Lab Results   Component Value Date/Time    WBC 6.1 12/06/2022 04:37 AM    HGB 11.2 12/06/2022 04:37 AM     12/06/2022 04:37 AM     BMP:  Lab Results   Component Value Date/Time     12/06/2022 04:37 AM    K 4.2 12/06/2022 04:37 AM     12/06/2022 04:37 AM    CO2 32 12/06/2022 04:37 AM    BUN 26 12/06/2022 04:37 AM    CREATININE 1.5 12/06/2022 04:37 AM    GLUCOSE 131 12/06/2022 04:37 AM    GLUCOSE 96 10/06/2011 03:30 PM     INR: No results found for: INR     CARDIAC LABS  ENZYMES:  No results for input(s): CKMB, CKMBINDEX, TROPONINI in the last 72 hours.     Invalid input(s): CKTOTAL;3    FASTING LIPID PANEL:  Lab Results   Component Value Date/Time    HDL 34 07/11/2022 11:44 AM    HDL 36 01/06/2012 02:50 PM    LDLCALC 96 07/11/2022 11:44 AM    TRIG 127 07/11/2022 11:44 AM    TSH 4.18 08/21/2017 05:03 PM     LIVER PROFILE:  Lab Results   Component Value Date/Time    AST 12 12/01/2022 10:23 PM    AST 7 12/01/2021 11:55 AM    ALT <5 12/01/2022 10:23 PM    ALT <5 12/01/2021 11:55 AM       -----------------------------------------------------------------  Telemetry: Personally reviewed: [facility]    Objective:   Vitals: BP (!) 144/77   Pulse 66   Temp 98.4 °F (36.9 °C) (Oral)   Resp 18   Ht 5' 6\" (1.676 m)   Wt 247 lb 5.7 oz (112.2 kg)   SpO2 91%   BMI 39.92 kg/m²   General appearance: alert, appears stated age and cooperative, No acute distress   Eyes: Conjunctiva and pupils normal and reactive  Skin: Skin color, texture, turgor normal. No rashes or ecchymosis.   Neck: no JVD, supple, symmetrical, trachea midline   Lungs: , no accessory muscle use, no respiratory distress  Heart: RRR  Abdomen: soft, non-tender; bowel sounds normal  Extremities: 1+ BBLE edema, DP +  Psychiatric: normal insight and affect    Patient Active Problem List:     Obstructive sleep apnea     Chronic kidney disease, stage III (moderate) ([facility])     Hyperlipidemia     Insomnia     Proteinuria     Anemia     Fatigue     Dyspnea on exertion     Bilateral leg weakness     Osteoarthritis     Diastolic dysfunction     Elevated PSA     Bell's palsy     Hypoxia     Decreased hearing     Chronic gout without tophus     Essential hypertension     Benign non-nodular prostatic hyperplasia with lower urinary tract symptoms     Gastroesophageal reflux disease without esophagitis     Pulmonary nodules     Hepatic cyst     Coronary artery calcification seen on CT scan     Difficulty walking     Diabetes mellitus type 2 in obese (HCC)     Goiter, nontoxic, multinodular     Primary osteoarthritis of right knee     Primary osteoarthritis of left knee     Morbid obesity (HCC)     Pulmonary emphysema (HCC)     Venous stasis dermatitis of both lower extremities     Acute renal failure superimposed on stage 4 chronic kidney disease (HCC)     Antineutrophil cytoplasmic antibody (ANCA) positive     Vitamin D deficiency     Hypercalcemia     Acute respiratory failure, unsp w hypoxia or hypercapnia (HCC)     Acute respiratory failure with hypoxia and hypercapnia (HCC)     Pneumonia due to infectious organism     RVF (right ventricular failure) (HCC)     Hypervolemia        Assessment & Plan:      Junctional rhythm w/ s/s episode  Acute dCHF   Elevated trop  HTN  ST depression    79 y/o man with a h/o HTN, HLD, DM, AVEL, GERD, obesity, CKD, HFpEF, [place] Palsy, who p/w SOB, edema, found to be hypoxic, CXR showed sm bilat pl effusions, trop max 0.05, echo showed EF 55-60%, grade I DD, sev RVH. Junctional rhythm with s/s  - Episode on 12/2 - pt unresponsive with w/ junctional rhythm on tele  - Felt to be a vagal episode  - 2 week Cam on d/c    Acute dCHF  - EF 55-60%, sev RVH  - CT chest showed BLLL atelectasis/consolidation, BL pl effusions, fluid volume overload, CM, extensive coronary calcifications  - NYHA class III  - QRS 90  - Off carvedilol  - Off furosemide - neg 4.3L  - Keep K+ > 4.0 and Mg > 2.0, Cr 1.5   - Daily weight, I&O  - Reviewed labs    Elevated trop/ST depression  - No s/s  - On statin, BB  - Limited echo    HTN  - BP on the high side  - Carvedilol d/c'd  - Will add amlodipine 5 mg QD      Radha Tran CNP  [facility]      I spent a total of 35 minutes in care of the patient and greater than 50% of the time was spent counseling with Orlando Kendall and coordinating care regarding their diagnosis, treatments and plan of care.

## 2022-12-06 NOTE — CARE COORDINATION
CM following. Pt is from home alone, has frequent assist with ADLs from step-daughter, Demetrio Sun. CM attempted to meet with pt to discuss DC planning, pt sleeping and unable to be awakened. CM called son, Aiyana Dhillon and Demetrio Sun to discuss DC plan. They reported that they feel pt can return home with [facility] , and likely would not be agreeable to SNF. They can transport at DC. May need new home O2 setup if not weaned, currently on 2.5L. CM will continue to follow for DC needs and recs.           Electronically signed by SHAHNAZ Thomas on 12/6/2022 at 1:47 PM  [phone]

## 2022-12-06 NOTE — PROGRESS NOTES
RT Evaluation for Home Oxygen    Resting (Room Air):  SpO2: 83    Resting (On O2):  SpO2:  92  O2 Device: Nasal cannula  O2 Flow Rate (L/min): 3 l/min  Comments: Patient is wheelchair bound at baseline    Does the Patient Qualify for Home O2: Yes  Liter Flow at Rest: 3  Does the Patient Need Portable Oxygen Tanks:  Yes      Additional Comments: Patient is wheelchair bound at baseline    Electronically signed by Sharan Tracey RCP on 12/6/2022 at 4:46 PM

## 2022-12-06 NOTE — PROGRESS NOTES
Progress Note    Admit Date: 12/1/2022  Day: 5  Diet: ADULT DIET; Regular; 4 carb choices (60 gm/meal); Low Sodium (2 gm)    CC: Shortness of breath    Interval history: No acute events overnight. Patient w/reported bowel movement x 2. Patient seen this AM and denied any headache, dizziness, lightheadedness, chest pain, abdominal pain, or any pleuritic chest pain. Patient remains saturating on 2 L supplemental oxygenation via nasal cannula. Additionally, urinary catheter to be removed today w/PVR and voiding trial.    HPI: Candelario Lechuga is a 80 y.o. male w/ PMHX of multiple chronic comorbidities including DM2, AVEL, HFpEF, CKD III, HTN, HLD, GERD, obesity listed below who presents with a chief complaint of shortness of breath. On chart review it appears family felt patient was grunting and having what appeared to be abdominal pain and wanted him to be evaluated. On arrival EMS noted patient with sats 80% on room air and was placed on 6 L nasal cannula. Patient is apparently hard of hearing at baseline and unfortunately did not bring his hearing aids. In the ED vitals noted /107, heart rate 94, respirations 20, temp 98.8, O2 93% on 6 L nasal cannula. CBC noted leukocytosis with 14.1 with neutrophil predominance and low absolute lymphocyte count at 0.7, stable normocytic anemia at 11.1 g/dL and normal platelet count. BMP remarkable for BUN 31, creatinine 2.1 (baseline~1.7). Lactic acid 1.5. Troponin 0.04. EKG without any obvious ischemic changes. LFTs normal.  VBG with pH 7.30, PCO2 64.3, bicarb 31.6. CXR with bibasilar airspace disease with small bilateral pleural effusions. Head CT is pending at this time of evaluation. He was started on Rocephin and azithromycin in the ED. Unfortunately patient is not able to give much information in the ED due to not having his hearing aids. It is unclear if he is altered from his baseline or if it is due to him not being able to hear appropriately.   I attempted to reach out to family for collateral information including his son Ankit Sauceda (called multiple times without answering) and his step daughter Azeb Arteaga ( wrong number). Medications:     Scheduled Meds:   torsemide  100 mg Oral Daily    torsemide  50 mg Oral Daily    cefTRIAXone (ROCEPHIN) IV  2,000 mg IntraVENous Q24H    polyethylene glycol  17 g Oral BID    ipratropium-albuterol  1 ampule Inhalation BID    allopurinol  200 mg Oral Daily    levothyroxine  25 mcg Oral Daily    atorvastatin  10 mg Oral Daily    tamsulosin  0.4 mg Oral Daily    sodium chloride flush  5-40 mL IntraVENous 2 times per day    heparin (porcine)  5,000 Units SubCUTAneous 3 times per day    insulin lispro  0-4 Units SubCUTAneous TID WC    insulin lispro  0-4 Units SubCUTAneous Nightly     Continuous Infusions:   sodium chloride Stopped (12/05/22 0535)    dextrose       PRN Meds:perflutren lipid microspheres, sodium chloride flush, sodium chloride, ondansetron **OR** ondansetron, acetaminophen **OR** acetaminophen, glucose, dextrose bolus **OR** dextrose bolus, glucagon (rDNA), dextrose, albuterol    Objective:   Vitals:   T-max:  Patient Vitals for the past 8 hrs:   BP Temp Temp src Pulse Resp SpO2 Weight   12/06/22 0509 -- -- -- -- -- -- 247 lb 5.7 oz (112.2 kg)   12/06/22 0452 (!) 144/77 98.4 °F (36.9 °C) Oral 65 18 92 % --   12/06/22 0150 -- -- -- 63 -- -- --   12/05/22 2305 138/76 98.2 °F (36.8 °C) Oral 65 18 93 % --         Intake/Output Summary (Last 24 hours) at 12/6/2022 0546  Last data filed at 12/6/2022 0510  Gross per 24 hour   Intake 1093.26 ml   Output 3600 ml   Net -2506.74 ml         Review of Systems   Constitutional: Negative. HENT: Negative. Eyes: Negative. Respiratory: Negative. Cardiovascular: Negative. Gastrointestinal: Negative. Negative for nausea. Genitourinary: Negative. Musculoskeletal: Negative. Skin: Negative. Neurological: Negative. Negative for dizziness and headaches. Psychiatric/Behavioral: Negative. Physical Exam  Vitals reviewed. Constitutional:       General: He is awake. He is not in acute distress. Appearance: Normal appearance. He is obese. He is not diaphoretic. Interventions: Nasal cannula in place. HENT:      Head: Normocephalic and atraumatic. Eyes:      Extraocular Movements: Extraocular movements intact. Cardiovascular:      Rate and Rhythm: Normal rate and regular rhythm. Heart sounds: Normal heart sounds. Pulmonary:      Effort: Pulmonary effort is normal.      Breath sounds: Rhonchi present. No wheezing or rales. Comments: Mild b/l rhonchi appreciated   Abdominal:      General: Abdomen is flat. Bowel sounds are normal.      Palpations: Abdomen is soft. Genitourinary:     Comments: Lemon catheter in place with good urine output   Musculoskeletal:         General: Normal range of motion. Right lower leg: No edema. Left lower leg: No edema. Skin:     General: Skin is warm and dry. Neurological:      Mental Status: He is alert and oriented to person, place, and time. Psychiatric:         Behavior: Behavior is cooperative. LABS:    CBC:   Recent Labs     12/04/22  0616 12/05/22  0503 12/06/22  0437   WBC 8.9 6.4 6.1   HGB 10.0* 10.7* 11.2*   HCT 31.1* 33.7* 36.0*    166 199   MCV 86.2 86.1 87.3       Renal:    Recent Labs     12/04/22  0616 12/05/22  0503 12/06/22  0437    139 142   K 3.9 4.1 4.2    102 103   CO2 29 31 32   BUN 41* 32* 26*   CREATININE 1.9* 1.6* 1.5*   GLUCOSE 130* 124* 131*   CALCIUM 9.6 10.0 10.3   MG 2.40  --   --    ANIONGAP 8 6 7       Hepatic:   No results for input(s): AST, ALT, BILITOT, BILIDIR, PROT, LABALBU, ALKPHOS in the last 72 hours. Troponin:   No results for input(s): TROPONINI in the last 72 hours. BNP: No results for input(s): BNP in the last 72 hours. Lipids: No results for input(s): CHOL, HDL in the last 72 hours.     Invalid input(s): LDLCALCU, TRIGLYCERIDE  ABGs:    No results for input(s): PHART, KNY6OUD, PO2ART, IBY7YEK, BEART, THGBART, J6BYMDHC, MPS3SHV in the last 72 hours. INR: No results for input(s): INR in the last 72 hours. Lactate: No results for input(s): LACTATE in the last 72 hours. Cultures:  -----------------------------------------------------------------  RAD:   CT HEAD WO CONTRAST   Final Result      No acute intracranial process. Chronic small vessel ischemic changes. EXAM: CT CHEST WITHOUT CONTRAST      INDICATION: Shortness of breath      COMPARISON: February 18, 2016      TECHNIQUE: CT of the chest was performed without contrast. Axial images, multiplanar reformatted images and axial maximum intensity projection images provided for review. Individualized dose optimization technique was used in order to meet ALARA    standards for radiation dose reduction. In addition to vendor specific dose reduction algorithms, the dose reduction techniques vary based on the specific scanner utilized but frequently include automated exposure control, adjustment of the mA and/or kV    according to patient size, and use of iterative reconstruction technique. CONTRAST: None. FINDINGS:      LUNGS AND AIRWAYS: Frothy mucus within the lower trachea. Bilateral lower lobe atelectasis/consolidation. Milder atelectasis of the posterior upper lobes. Mild smooth interlobular septal thickening particularly in the lung apices consistent with a    component of interstitial edema. PLEURA: Trace bilateral pleural effusions. HEART AND GREAT VESSELS: Cardiomegaly. Extensive coronary artery calcification. Thoracic aorta and main pulmonary artery normal in caliber. No pericardial effusion. ADENOPATHY/MEDIASTINUM: Mildly prominent mediastinal lymph nodes, favored to be reactive in nature given the associated airspace disease. CHEST WALL / LOWER NECK: No significant abnormality.       UPPER ABDOMEN: Large at least 13 mm cyst again within the visualized liver. Prior left nephrectomy with surgical clips. BONES: No significant abnormality. IMPRESSION:      Bilateral lower lobe atelectasis/consolidation. Associated trace bilateral pleural effusions. Mild smooth interlobular septal thickening suggestive of mild volume overload/interstitial edema. Cardiomegaly. Extensive coronary artery calcification. Hepatic cyst.      CT CHEST WO CONTRAST   Final Result      No acute intracranial process. Chronic small vessel ischemic changes. EXAM: CT CHEST WITHOUT CONTRAST      INDICATION: Shortness of breath      COMPARISON: February 18, 2016      TECHNIQUE: CT of the chest was performed without contrast. Axial images, multiplanar reformatted images and axial maximum intensity projection images provided for review. Individualized dose optimization technique was used in order to meet ALARA    standards for radiation dose reduction. In addition to vendor specific dose reduction algorithms, the dose reduction techniques vary based on the specific scanner utilized but frequently include automated exposure control, adjustment of the mA and/or kV    according to patient size, and use of iterative reconstruction technique. CONTRAST: None. FINDINGS:      LUNGS AND AIRWAYS: Frothy mucus within the lower trachea. Bilateral lower lobe atelectasis/consolidation. Milder atelectasis of the posterior upper lobes. Mild smooth interlobular septal thickening particularly in the lung apices consistent with a    component of interstitial edema. PLEURA: Trace bilateral pleural effusions. HEART AND GREAT VESSELS: Cardiomegaly. Extensive coronary artery calcification. Thoracic aorta and main pulmonary artery normal in caliber. No pericardial effusion. ADENOPATHY/MEDIASTINUM: Mildly prominent mediastinal lymph nodes, favored to be reactive in nature given the associated airspace disease.       CHEST WALL / LOWER NECK: No significant abnormality. UPPER ABDOMEN: Large at least 13 mm cyst again within the visualized liver. Prior left nephrectomy with surgical clips. BONES: No significant abnormality. IMPRESSION:      Bilateral lower lobe atelectasis/consolidation. Associated trace bilateral pleural effusions. Mild smooth interlobular septal thickening suggestive of mild volume overload/interstitial edema. Cardiomegaly. Extensive coronary artery calcification. Hepatic cyst.      XR CHEST PORTABLE   Final Result      1. Bibasilar airspace disease with small bilateral pleural effusions. Assessment/Plan:     Orlando Kendall is a 80 y.o. male w/ multiple chronic co morbidities p/w hypoxia and questionable AMS. Dx CAP. Treatment with IVF, Abx, O2     #Acute hypoxic hypercapnic respiratory failure (improving)  HFpEF exacerbation  Suspected CAP   Not on O2 at home. CXR with bibasilar airspace disease. VBG with pH 7.33 (initially 7.3), pCO2 53.7 mmHg (initially 64 mmHg). Pro-calcitonin 41.16 ng/mL. COVID and Influenza negative  - Continue Cefepime + Azithromycin  -Blood cultures growing Enterococcus, citrobacter. Switched ceftriaxone to cefepime. Will follow up on sensitivities   - Continue supp O2 to maintain O2 >92% and wean as possible  - Duonebs  - Lasix 60 mg BID IV on hold  -on home torsemide (100 mg in the AM, 50 mg in the PM)   -w/increased urine output  - Cardiology c/s, joselin recs  - Strict I&O, Daily weights  - optimize lytes ( Mag >2, K >4) and monitor with BMP  -will have echo today  -sat on 2 L supplemental oxygenation via NC, wean supplemental oxygenation as tolerated    #Enterobacter, citrobacter bacteremia  + on both Cxs.  - Stopped Rocephin (s/p 1 day.  Started Cefepime (day 1)  -UA + w/cx pending   -ID consulted w/recs: ceftriaxone, holliday removal, d/c on oral abx:cephalosporin or fluoroquinolone  -started on ceftriaxone, day 2     #Junctional Rhythm  W/episode of likely vagal syncope during hospitalization  -EP consulted w/recs:   -carvedilol for BP has been d/c, for further BP management started on: amlodipine 5 mg QD  -limited echo today  -to be d/c with CAM for 2 weeks  -on tele  -K >4, Mg > 2    #Troponinemia  - Trop 0.04(12/01/22), 0.05 (12/02/2022). Stopped trending- patient asymptomatic.   - EKG with T wave inversions in inferior leads. 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    -limited echo     #MARY ANN on CKD III, MARY ANN resolved  Possibly 2/2 cardiorenal syndrome  (Baseline creatinine ~1.6)  -improvement in cr to baseline     #Urinary Retention  W/urinary catheter placed by urology   -catheter to be removed today with PVR  -void trial     #DM2  - not on any medications. Last HgbA1c 7.2 ub 7/2022  - Hypoglycemia protocol  - LDSSI   -POCT [id number]      Chronic Conditions:  Hypothyroidism  - Continue home synthroid 25 mcg daily  BPH  - Continue home Flomax 0.4 mg daily  HTN  - Continue home Coreg 25 mg BID  HLD  - Continue Atorvastatin 10 mg daily       Code Status: Full code  FEN: Low-sodium diet, low card diet  PPX: Subcu heparin  DISPO: GMF (possible d/c tomorrow pending echo/EP further recs)    Alina Aguilera MD PGY-1  12/06/22  5:46 AM    This patient has been staffed and discussed with Billy Roper MD.    Addendum to Resident H& P/Progress note:  I have personally seen,examined and evaluated the patient. I have reviewed the current history, physical findings, labs and assessment and plan and agree with note as documented by resident MD ( Ricarda Lazo)  D/c recommendations from  were reviewed.     Billy Roper MD, FACP

## 2022-12-06 NOTE — PROGRESS NOTES
ID Follow-up NOTE    CC:   Citrobacter bacteremia  Antibiotics: Ceftriaxone     Admit Date: 12/1/2022  Hospital Day: 6    Subjective:     Patient feels good  No complaint       Objective:     Patient Vitals for the past 8 hrs:   BP Temp Temp src Pulse Resp SpO2 Weight   12/06/22 0852 (!) 143/79 98 °F (36.7 °C) Oral 64 -- 95 % --   12/06/22 0750 -- -- -- 66 18 91 % --   12/06/22 0547 -- -- -- 61 -- -- --   12/06/22 0509 -- -- -- -- -- -- 247 lb 5.7 oz (112.2 kg)   12/06/22 0452 (!) 144/77 98.4 °F (36.9 °C) Oral 65 18 92 % --   12/06/22 0150 -- -- -- 63 -- -- --     I/O last 3 completed shifts: In: 1343.3 [P.O.:1080; I.V.:27.4; IV Piggyback:235.9]  Out: 3925 [Urine:3925]  No intake/output data recorded. EXAM:  GENERAL: No apparent distress.     HEENT: Membranes moist, no oral lesion  NECK:  Supple, no lymphadenopathy  LUNGS: Clear b/l, no rales, no dullness  CARDIAC: RRR, no murmur appreciated  ABD:  + BS, soft / NT  EXT:  No rash, no edema, no lesions  NEURO: No focal neurologic findings  PSYCH: Orientation, sensorium, mood normal  LINES:  Peripheral iv       Data Review:  Lab Results   Component Value Date    WBC 6.1 12/06/2022    HGB 11.2 (L) 12/06/2022    HCT 36.0 (L) 12/06/2022    MCV 87.3 12/06/2022     12/06/2022     Lab Results   Component Value Date    CREATININE 1.5 (H) 12/06/2022    BUN 26 (H) 12/06/2022     12/06/2022    K 4.2 12/06/2022     12/06/2022    CO2 32 12/06/2022       Hepatic Function Panel:   Lab Results   Component Value Date/Time    ALKPHOS 64 12/01/2022 10:23 PM    ALT <5 12/01/2022 10:23 PM    AST 12 12/01/2022 10:23 PM    PROT 8.1 12/01/2022 10:23 PM    PROT 8.6 10/08/2012 03:22 PM    BILITOT 0.8 12/01/2022 10:23 PM    BILIDIR <0.2 12/01/2022 10:23 PM    IBILI see below 12/01/2022 10:23 PM    LABALBU 4.1 12/01/2022 10:23 PM       Micro:  12/2 BC x 2 C koseri  Citrobacter koseri (2)  Antibiotic Interpretation Microscan     amoxicillin-clavulanate Resistant <=8/4 mcg/mL ampicillin Resistant >16 mcg/mL   ampicillin-sulbactam Sensitive <=8/4 mcg/mL   ceFAZolin Sensitive <=2 mcg/mL   cefepime Sensitive <=2 mcg/mL   cefTRIAXone Sensitive <=1 mcg/mL   cefuroxime Sensitive 8 mcg/mL   ciprofloxacin Sensitive <=1 mcg/mL   ertapenem Sensitive <=0.5 mcg/mL   gentamicin Sensitive <=4 mcg/mL   meropenem Sensitive <=1 mcg/mL   piperacillin-tazobactam Sensitive <=16 mcg/mL   trimethoprim-sulfamethoxazole Sensitive <=2/38 mcg/mL      Imagin/2 Chest CT  Bilateral lower lobe atelectasis/consolidation. Associated trace bilateral pleural effusions. Mild smooth interlobular septal thickening suggestive of mild volume overload/interstitial edema. Cardiomegaly. Extensive coronary artery calcification.    Hepatic cyst.      Scheduled Meds:   torsemide  100 mg Oral Daily    torsemide  50 mg Oral Daily    cefTRIAXone (ROCEPHIN) IV  2,000 mg IntraVENous Q24H    polyethylene glycol  17 g Oral BID    ipratropium-albuterol  1 ampule Inhalation BID    allopurinol  200 mg Oral Daily    levothyroxine  25 mcg Oral Daily    atorvastatin  10 mg Oral Daily    tamsulosin  0.4 mg Oral Daily    sodium chloride flush  5-40 mL IntraVENous 2 times per day    heparin (porcine)  5,000 Units SubCUTAneous 3 times per day    insulin lispro  0-4 Units SubCUTAneous TID WC    insulin lispro  0-4 Units SubCUTAneous Nightly       Continuous Infusions:   sodium chloride Stopped (22 0535)    dextrose         PRN Meds:  perflutren lipid microspheres, sodium chloride flush, sodium chloride, ondansetron **OR** ondansetron, acetaminophen **OR** acetaminophen, glucose, dextrose bolus **OR** dextrose bolus, glucagon (rDNA), dextrose, albuterol      Assessment:     Hx DM, CHF, CKD, obesity (BMI 40), JARRETT, HL     CHF     Complicated UTI secondary to urinary retention  Citrobacter bacteremia,  source   - holliday placed     NKDA      Plan:     Cont Ceftriaxone   Holliday per  (recommended removal, check PVR)     Discharge on oral antibiotic - cephalosporin or fluoroquinolone    Medical Decision Making:   The following items were considered in medical decision making:  Discussion of patient care with other providers  Reviewed clinical lab tests  Reviewed radiology tests  Reviewed other diagnostic tests/interventions  Microbiology cultures and other micro tests reviewed      Discussed with pt, Medical Resident  Deanne Prasad MD

## 2022-12-06 NOTE — PROGRESS NOTES
2 week CAM monitor # [id number] applied per order Chang Bonner CNP for JR & syncope. Instructions given and questions answered. Bedside nurse aware.

## 2022-12-06 NOTE — PROGRESS NOTES
[address] Daily Progress Note      Admit Date:  12/1/2022    CC:  follow up SOB. Subjective:  Mr. Eloy Castillo denies CP   some SOB but overall improved    Objective:   /77   Pulse 66   Temp 98.6 °F (37 °C) (Oral)   Resp 18   Ht 5' 6\" (1.676 m)   Wt 247 lb 5.7 oz (112.2 kg)   SpO2 90%   BMI 39.92 kg/m²     Intake/Output Summary (Last 24 hours) at 12/6/2022 1528  Last data filed at 12/6/2022 6128  Gross per 24 hour   Intake 410 ml   Output 3250 ml   Net -2840 ml       TELEMETRY: Sinus    Physical Exam:  General:  Awake, alert, NAD  Eyes:  EOMI PERRL anicteric  Skin:  Warm and dry. Neck:  JVP normal  Chest:  Diminshed. No Rales. Cardiovascular:  RRR, Normal S1S2. No Murmur. No Rub. No Gallops. Abdomen:  Soft NT  + bs  Extremities:  ++ edema  Neuro:  CN II-XII intact. No focal deficits. No weakness. No lateralizing findings. Psych:  Normal thought and affect. MSK:  No Cyanosis nor Clubbing.   Symmetrical strength upper and lower extremities    Medications:    polyethylene glycol  17 g Oral Daily    amLODIPine  5 mg Oral Daily    torsemide  100 mg Oral Daily    torsemide  50 mg Oral Daily    cefTRIAXone (ROCEPHIN) IV  2,000 mg IntraVENous Q24H    ipratropium-albuterol  1 ampule Inhalation BID    allopurinol  200 mg Oral Daily    levothyroxine  25 mcg Oral Daily    atorvastatin  10 mg Oral Daily    tamsulosin  0.4 mg Oral Daily    sodium chloride flush  5-40 mL IntraVENous 2 times per day    heparin (porcine)  5,000 Units SubCUTAneous 3 times per day    insulin lispro  0-4 Units SubCUTAneous TID     insulin lispro  0-4 Units SubCUTAneous Nightly      sodium chloride Stopped (12/05/22 0535)    dextrose       perflutren lipid microspheres, sodium chloride flush, sodium chloride, ondansetron **OR** ondansetron, acetaminophen **OR** acetaminophen, glucose, dextrose bolus **OR** dextrose bolus, glucagon (rDNA), dextrose, albuterol    Lab Data:  CBC:   Recent Labs     12/04/22  0616 12/05/22  0503 12/06/22  0437   WBC 8.9 6.4 6.1   HGB 10.0* 10.7* 11.2*   HCT 31.1* 33.7* 36.0*   MCV 86.2 86.1 87.3    166 199     BMP:   Recent Labs     12/04/22  0616 12/05/22  0503 12/06/22  0437    139 142   K 3.9 4.1 4.2    102 103   CO2 29 31 32   BUN 41* 32* 26*   CREATININE 1.9* 1.6* 1.5*         Assessment:  Patient Active Problem List    Diagnosis Date Noted    Diabetes mellitus type 2 in obese ([facility] 08/05/2016    Essential hypertension 11/03/2015    Anemia 04/09/2012    Chronic kidney disease, stage III (moderate) (HCC)     Hyperlipidemia     Complicated UTI (urinary tract infection) 12/05/2022    Gram-negative bacteremia 12/05/2022    Enterococcal bacteremia 12/04/2022    Junctional rhythm 12/03/2022    Syncope and collapse 12/03/2022    Acute respiratory failure, unsp w hypoxia or hypercapnia (HCC) 12/02/2022    Acute respiratory failure with hypoxia and hypercapnia (HCC) 12/02/2022    Pneumonia due to infectious organism 12/02/2022    RVF (right ventricular failure) ([facility] 12/02/2022    Hypervolemia 12/02/2022    Hypercalcemia 08/24/2017    Chronic gout without tophus 11/03/2015    Elevated PSA 05/07/2015    Proteinuria 09/27/2010    Hepatic cyst 02/18/2016    Coronary artery calcification seen on CT scan 02/18/2016    Pulmonary nodules 02/04/2016    Benign non-nodular prostatic hyperplasia with lower urinary tract symptoms 11/03/2015    Gastroesophageal reflux disease without esophagitis 11/03/2015    Bell's palsy 24/15/6202    Diastolic dysfunction 79/79/1116    Dyspnea on exertion 02/07/2014    Insomnia 06/29/2010    Obstructive sleep apnea     Antineutrophil cytoplasmic antibody (ANCA) positive 08/21/2017    Vitamin D deficiency 08/21/2017    Venous stasis dermatitis of both lower extremities 49/41/8559    Acute diastolic CHF (congestive heart failure) ([facility]     Acute renal failure superimposed on stage 4 chronic kidney disease ([facility]     Pulmonary emphysema ([address] 02/08/2017 Primary osteoarthritis of right knee 11/08/2016    Primary osteoarthritis of left knee 11/08/2016    Morbid obesity ([facility] 11/08/2016    Goiter, nontoxic, multinodular 08/05/2016    Difficulty walking 05/05/2016    Decreased hearing 09/16/2015    Hypoxia 08/06/2015    Bilateral leg weakness 05/08/2014    Osteoarthritis 05/08/2014    Fatigue 03/08/2013       Plan:  CAD:  possibly present given age and TWI anterior leads. Pt denies any chest pain. Check troponin. Trop was elevated but Creat was higher. Med tx for CAD  UTI;  retention with holliday and atbs. dHF;  diuretic continues.   Neg 4.6 liters    Core Measures:  Discharge instructions:   LVEF documented:   ACEI for LV dysfunction:   Smoking Cessation:    Brandon Boucher MD, MD 12/6/2022 3:28 PM

## 2022-12-07 VITALS
HEIGHT: 66 IN | WEIGHT: 242.95 LBS | HEART RATE: 76 BPM | DIASTOLIC BLOOD PRESSURE: 77 MMHG | RESPIRATION RATE: 18 BRPM | SYSTOLIC BLOOD PRESSURE: 127 MMHG | TEMPERATURE: 97.4 F | BODY MASS INDEX: 39.04 KG/M2 | OXYGEN SATURATION: 94 %

## 2022-12-07 PROBLEM — R77.8 ELEVATED TROPONIN: Status: ACTIVE | Noted: 2022-12-07

## 2022-12-07 PROBLEM — I50.32 CHRONIC HEART FAILURE WITH PRESERVED EJECTION FRACTION (HFPEF) (HCC): Status: ACTIVE | Noted: 2022-12-07

## 2022-12-07 PROBLEM — R00.1 JUNCTIONAL BRADYCARDIA: Status: ACTIVE | Noted: 2022-12-03

## 2022-12-07 PROBLEM — R79.89 ELEVATED TROPONIN: Status: ACTIVE | Noted: 2022-12-07

## 2022-12-07 PROBLEM — I27.20 MODERATE TO SEVERE PULMONARY HYPERTENSION (HCC): Status: ACTIVE | Noted: 2022-12-07

## 2022-12-07 PROBLEM — I07.1 MODERATE TRICUSPID REGURGITATION: Status: ACTIVE | Noted: 2022-12-07

## 2022-12-07 LAB
ANION GAP SERPL CALCULATED.3IONS-SCNC: 9 MMOL/L (ref 3–16)
BASOPHILS ABSOLUTE: 0 K/UL (ref 0–0.2)
BASOPHILS RELATIVE PERCENT: 0.3 %
BUN BLDV-MCNC: 27 MG/DL (ref 7–20)
CALCIUM SERPL-MCNC: 10.7 MG/DL (ref 8.3–10.6)
CHLORIDE BLD-SCNC: 99 MMOL/L (ref 99–110)
CO2: 35 MMOL/L (ref 21–32)
CREAT SERPL-MCNC: 1.6 MG/DL (ref 0.8–1.3)
EOSINOPHILS ABSOLUTE: 0.3 K/UL (ref 0–0.6)
EOSINOPHILS RELATIVE PERCENT: 4.4 %
GFR SERPL CREATININE-BSD FRML MDRD: 41 ML/MIN/{1.73_M2}
GLUCOSE BLD-MCNC: 104 MG/DL (ref 70–99)
GLUCOSE BLD-MCNC: 118 MG/DL (ref 70–99)
GLUCOSE BLD-MCNC: 129 MG/DL (ref 70–99)
HCT VFR BLD CALC: 39.6 % (ref 40.5–52.5)
HEMOGLOBIN: 12.2 G/DL (ref 13.5–17.5)
LYMPHOCYTES ABSOLUTE: 1.2 K/UL (ref 1–5.1)
LYMPHOCYTES RELATIVE PERCENT: 17.3 %
MCH RBC QN AUTO: 27.2 PG (ref 26–34)
MCHC RBC AUTO-ENTMCNC: 30.9 G/DL (ref 31–36)
MCV RBC AUTO: 87.9 FL (ref 80–100)
MONOCYTES ABSOLUTE: 0.6 K/UL (ref 0–1.3)
MONOCYTES RELATIVE PERCENT: 8.8 %
NEUTROPHILS ABSOLUTE: 4.6 K/UL (ref 1.7–7.7)
NEUTROPHILS RELATIVE PERCENT: 69.2 %
PDW BLD-RTO: 16.2 % (ref 12.4–15.4)
PERFORMED ON: ABNORMAL
PERFORMED ON: ABNORMAL
PLATELET # BLD: 254 K/UL (ref 135–450)
PMV BLD AUTO: 8.5 FL (ref 5–10.5)
POTASSIUM REFLEX MAGNESIUM: 4 MMOL/L (ref 3.5–5.1)
RBC # BLD: 4.5 M/UL (ref 4.2–5.9)
SODIUM BLD-SCNC: 143 MMOL/L (ref 136–145)
WBC # BLD: 6.6 K/UL (ref 4–11)

## 2022-12-07 PROCEDURE — 2580000003 HC RX 258: Performed by: PHYSICIAN ASSISTANT

## 2022-12-07 PROCEDURE — 97530 THERAPEUTIC ACTIVITIES: CPT

## 2022-12-07 PROCEDURE — 99233 SBSQ HOSP IP/OBS HIGH 50: CPT | Performed by: NURSE PRACTITIONER

## 2022-12-07 PROCEDURE — 6370000000 HC RX 637 (ALT 250 FOR IP): Performed by: PHYSICIAN ASSISTANT

## 2022-12-07 PROCEDURE — 6370000000 HC RX 637 (ALT 250 FOR IP): Performed by: NURSE PRACTITIONER

## 2022-12-07 PROCEDURE — 6360000002 HC RX W HCPCS: Performed by: PHYSICIAN ASSISTANT

## 2022-12-07 PROCEDURE — 99239 HOSP IP/OBS DSCHRG MGMT >30: CPT | Performed by: HOSPITALIST

## 2022-12-07 PROCEDURE — 94640 AIRWAY INHALATION TREATMENT: CPT

## 2022-12-07 PROCEDURE — 36415 COLL VENOUS BLD VENIPUNCTURE: CPT

## 2022-12-07 PROCEDURE — 6370000000 HC RX 637 (ALT 250 FOR IP)

## 2022-12-07 PROCEDURE — 85025 COMPLETE CBC W/AUTO DIFF WBC: CPT

## 2022-12-07 PROCEDURE — 80048 BASIC METABOLIC PNL TOTAL CA: CPT

## 2022-12-07 PROCEDURE — 6370000000 HC RX 637 (ALT 250 FOR IP): Performed by: HOSPITALIST

## 2022-12-07 RX ORDER — CEFDINIR 300 MG/1
300 CAPSULE ORAL 2 TIMES DAILY
Qty: 14 CAPSULE | Refills: 0 | Status: SHIPPED | OUTPATIENT
Start: 2022-12-07 | End: 2022-12-14

## 2022-12-07 RX ORDER — AMLODIPINE BESYLATE 5 MG/1
5 TABLET ORAL DAILY
Qty: 30 TABLET | Refills: 3 | Status: SHIPPED | OUTPATIENT
Start: 2022-12-08

## 2022-12-07 RX ADMIN — HEPARIN SODIUM 5000 UNITS: 5000 INJECTION INTRAVENOUS; SUBCUTANEOUS at 05:56

## 2022-12-07 RX ADMIN — IPRATROPIUM BROMIDE AND ALBUTEROL SULFATE 1 AMPULE: 2.5; .5 SOLUTION RESPIRATORY (INHALATION) at 09:31

## 2022-12-07 RX ADMIN — LEVOTHYROXINE SODIUM 25 MCG: 0.03 TABLET ORAL at 09:13

## 2022-12-07 RX ADMIN — ATORVASTATIN CALCIUM 10 MG: 20 TABLET, FILM COATED ORAL at 09:12

## 2022-12-07 RX ADMIN — SODIUM CHLORIDE, PRESERVATIVE FREE 10 ML: 5 INJECTION INTRAVENOUS at 09:14

## 2022-12-07 RX ADMIN — TORSEMIDE 100 MG: 100 TABLET ORAL at 09:13

## 2022-12-07 RX ADMIN — HEPARIN SODIUM 5000 UNITS: 5000 INJECTION INTRAVENOUS; SUBCUTANEOUS at 14:41

## 2022-12-07 RX ADMIN — AMLODIPINE BESYLATE 5 MG: 5 TABLET ORAL at 09:12

## 2022-12-07 RX ADMIN — ALLOPURINOL 200 MG: 100 TABLET ORAL at 09:13

## 2022-12-07 RX ADMIN — TAMSULOSIN HYDROCHLORIDE 0.4 MG: 0.4 CAPSULE ORAL at 09:12

## 2022-12-07 ASSESSMENT — ENCOUNTER SYMPTOMS
ABDOMINAL PAIN: 0
EYES NEGATIVE: 1
RESPIRATORY NEGATIVE: 1
SHORTNESS OF BREATH: 0

## 2022-12-07 NOTE — DISCHARGE INSTR - COC
Continuity of Care Form    Patient Name: Jarek Mccarthy   :  1934  MRN:  [id number]    Admit date:  2022  Discharge date:  ***    Code Status Order: Full Code   Advance Directives:     Admitting Physician:  Fifi Vasquez MD  PCP: Kimberly Fiore MD    Discharging Nurse: [facility] Unit/Room#: 3472/9559-07  Discharging Unit Phone Number: ***    Emergency Contact:   Extended Emergency Contact Information  Primary Emergency Contact: [address] of [address] [place] [address] Phone: [phone]  Relation: Child  Secondary Emergency Contact: Ronda Strauss  Address: [address] [place] [address]           [facility] [address] [facility] of [address] [place] [address] Phone: [phone]  Mobile Phone: [phone]  Relation: Step Child  Preferred language: English    Past Surgical History:  Past Surgical History:   Procedure Laterality Date    APPENDECTOMY      COLONOSCOPY      Dr. Kelly Petit FLX DX W/COLLJ Bertha  PFRMD N/A 2018    COLONOSCOPY WITH MAC performed by Stella Quintana MD at [facility]      left sided - 2000    TURP      6314    UMBILICAL HERNIA REPAIR      2000    UPPER GASTROINTESTINAL ENDOSCOPY N/A 2018    EGD BIOPSY Forcep biopsy of gastric R/O H. Pylori performed by Stella Quintana MD at [facility] ENDOSCOPY       Immunization History:   Immunization History   Administered Date(s) Administered    COVID-19, MODERNA BLUE border, Primary or Immunocompromised, (age 12y+), IM, 100 mcg/0.5mL 2021, 2021    COVID-19, [address] [facility], (age 12y+), IM, 48 mcg/0.5 mL 2022    COVID-19, MODERNA Booster BLUE border, (age 18y+), IM, 50mcg/0.25mL 10/27/2021, 2022    FLUZONE 3 YEARS AND OVER 2014    Influenza Vaccine, unspecified formulation 2015, 2016    Influenza, FLUAD, (age 72 y+), Adjuvanted, 0.5mL 2020    Influenza, High Dose (Fluzone 65 yrs and older) 09/27/2010, 09/22/2011, 10/08/2012, 09/16/2015, 09/20/2016, 08/21/2017, 10/16/2018    Influenza, Triv, inactivated, subunit, adjuvanted, IM (Fluad 65 yrs and older) 10/28/2019    Pneumococcal Conjugate 13-valent (Evelyn Genta) 09/16/2015    Pneumococcal Polysaccharide (Mnneticgv00) 04/16/2009       Active Problems:  Patient Active Problem List   Diagnosis Code    Obstructive sleep apnea G47.33    Chronic kidney disease, stage III (moderate) ([facility]) N18.30    Hyperlipidemia E78.5    Insomnia G47.00    Proteinuria R80.9    Anemia D64.9    Fatigue R53.83    Dyspnea on exertion R06.09    Bilateral leg weakness R29.898    Osteoarthritis X72.94    Diastolic dysfunction J89.75    Elevated PSA R97.20    Bell's palsy G51.0    Hypoxia R09.02    Decreased hearing H91.90    Chronic gout without tophus M1A. 9XX0    Primary hypertension I10    Benign non-nodular prostatic hyperplasia with lower urinary tract symptoms N40.1    Gastroesophageal reflux disease without esophagitis K21.9    Pulmonary nodules R91.8    Hepatic cyst K76.89    CAD in native artery I25.10    Difficulty walking R26.2    Diabetes mellitus type 2 in obese ([facility]) E11.69, E66.9    Goiter, nontoxic, multinodular E04.2    Primary osteoarthritis of right knee M17.11    Primary osteoarthritis of left knee M17.12    Morbid obesity ([facility]) E66.01    Pulmonary emphysema ([facility]) J43.9    Venous stasis dermatitis of both lower extremities X91.0    Acute diastolic CHF (congestive heart failure) ([facility]) I50.31    Acute renal failure superimposed on stage 4 chronic kidney disease ([facility]) N17.9, N18.4    Antineutrophil cytoplasmic antibody (ANCA) positive R76.8    Vitamin D deficiency E55.9    Hypercalcemia E83.52    Acute respiratory failure, unsp w hypoxia or hypercapnia ([facility]) J96.00    Acute respiratory failure with hypoxia and hypercapnia ([facility]) J96.01, J96.02    Pneumonia due to infectious organism J18.9    RVF (right ventricular failure) ([facility]) I50.810    Hypervolemia E87.70 Junctional bradycardia R00.1    Syncope and collapse R55    Enterococcal bacteremia U06.06, U85.2    Complicated UTI (urinary tract infection) N39.0    Gram-negative bacteremia R78.81    Chronic heart failure with preserved ejection fraction (HFpEF) ([facility]) I50.32    Elevated troponin R77.8    Moderate tricuspid regurgitation I07.1    Moderate to severe pulmonary hypertension ([facility]) I27.20       Isolation/Infection:   Isolation            No Isolation          Patient Infection Status       Infection Onset Added Last Indicated Last Indicated By Review Planned Expiration Resolved Resolved By    None active    Resolved    COVID-19 (Rule Out) 22 COVID-19, Rapid (Ordered)   22 Rule-Out Test Resulted            Nurse Assessment:  Last Vital Signs: /65   Pulse 66   Temp 98.3 °F (36.8 °C) (Oral)   Resp 16   Ht 5' 6\" (1.676 m)   Wt 242 lb 15.2 oz (110.2 kg)   SpO2 90%   BMI 39.21 kg/m²     Last documented pain score (0-10 scale):    Last Weight:   Wt Readings from Last 1 Encounters:   22 242 lb 15.2 oz (110.2 kg)     Mental Status:  {IP PT MENTAL STATUS:}    IV Access:  { NICOLAS IV ACCESS:530299441}    Nursing Mobility/ADLs:  Walking   {CHP DME WTRW:901230358}  Transfer  {CHP DME XQZX:626107352}  Bathing  {P DME KFKD:602224234}  Dressing  {P DME DCYW:248753972}  Toileting  {P DME UZPH:750450348}  Feeding  {P DME FELL:740080132}  Med Admin  {P DME QSVP:156045565}  Med Delivery   { NICOLAS MED Delivery:379994790}    Wound Care Documentation and Therapy:        Elimination:  Continence: Bowel: {YES / BU:36321}  Bladder: {YES / UB:60115}  Urinary Catheter: {Urinary Catheter:590026306}   Colostomy/Ileostomy/Ileal Conduit: {YES / A}       Date of Last BM: ***    Intake/Output Summary (Last 24 hours) at 2022 1050  Last data filed at 2022 0410  Gross per 24 hour   Intake 650 ml   Output 3170 ml   Net -2520 ml     I/O last 3 completed shifts:   In: 1300 [P.O.:1200; IV Piggyback:100]  Out: 9163 [Urine:6420]    Safety Concerns:     [address] [employer] Safety Concerns:675934409}    Impairments/Disabilities:      [address] [employer] Impairments/Disabilities:209776552}    Nutrition Therapy:  Current Nutrition Therapy:   [address] [employer] Diet List:378360643}    Routes of Feeding: {CHP DME Other Feedings:187231707}  Liquids: {Slp liquid thickness:97506}  Daily Fluid Restriction: {CHP DME Yes amt example:702291263}  Last Modified Barium Swallow with Video (Video Swallowing Test): {Done Not Done RMXV:174582154}    Treatments at the Time of Hospital Discharge:   Respiratory Treatments: ***  Oxygen Therapy:  {Therapy; copd oxygen:83413}  Ventilator:    {MH CC Vent XYES:678618869}    Heart Failure Instructions for Daily Management  Patient was treated for acute on chronic diastolic heart failure. he  will require the following:    Please weigh daily on the same scale and approximately the same time of day. Report weight gain of 3 pounds/day or 5 pounds/week to : Leyla Kendall [phone] and [facility] [phone]. Please use hospital discharge weight as baseline reference. Please monitor for signs and symptoms of and report to MD:  Worsening Heart Failure: sudden weight gain, shortness of breath, lower extremity or general edema/swelling, abdominal bloating/swelling, inability to lie flat, intolerance to usual activity, or cough (especially at night). Report these finding even if no increase in weight. Dehydration:  having difficulty or a decrease in urination, dizziness, worsening fatigue, or new onset/worsening of generalized weakness. Please continue a LOW SODIUM diet and LIMIT fluid intake to 48 - 64 ounces ( 1.5 - 2 liters) per day. Call Leyla Kendall [phone] and [facility] [phone] with any questions or concerns. Please continue heart failure education to patient and family/support system.   See After Visit Summary for hospital follow up appointment details. Consider spiritual care referral for support and/or completion of advance directives . Consider: [place] [address] telehealth program if patient agreeable and able to participate and palliative care consult for ongoing goals of care, end of life, and/or chronic disease management discussions. Patient's primary cardiologist is Dr Nimisha Gaytan. Please draw BMP on  and fax results to PCP / [facility] for review with patient at follow up. Rehab Therapies: {THERAPEUTIC INTERVENTION:9004080776}  Weight Bearing Status/Restrictions: [employer] Weight Bearin}  Other Medical Equipment (for information only, NOT a DME order):  {EQUIPMENT:419714583}  Other Treatments: ***    Patient's personal belongings (please select all that are sent with patient):  {CHP DME Belongings:657854154}    RN SIGNATURE:  {Esignature:523943427}    CASE MANAGEMENT/SOCIAL WORK SECTION    Inpatient Status Date: 22    Readmission Risk Assessment Score:  Readmission Risk              Risk of Unplanned Readmission:  [facility]    Phone: Neydame Vonnie  Phone: [phone]    / signature: Electronically signed by Cosme Holter, MSW, LSW on 22 at 1:43 PM EST    PHYSICIAN SECTION    Prognosis: {Prognosis:1519471838}    Condition at Discharge: [place]razo Patient Condition:228442531}    Rehab Potential (if transferring to Rehab): {Prognosis:2970217154}    Recommended Labs or Other Treatments After Discharge: ***    Physician Certification: I certify the above information and transfer of Cony Salgado  is necessary for the continuing treatment of the diagnosis listed and that he requires {Admit to Appropriate Level of Care:93389} for {GREATER/LESS:459435039} 30 days.      Update Admission H&P: {CHP DME Changes in DMCGD:195224941}    PHYSICIAN SIGNATURE:  {Esignature:874970298}

## 2022-12-07 NOTE — DISCHARGE SUMMARY
INTERNAL MEDICINE DEPARTMENT AT [address] [place] [address]  DISCHARGE SUMMARY    Patient ID: Maki Gonzales                                             Discharge Date: 12/7/2022   Patient's PCP: Leyla Kendall MD                                          Discharge Physician: Torito Walton MD  Admit Date: 12/1/2022   Admitting Physician: Torito Walton MD    PROBLEMS DURING HOSPITALIZATION:  Acute hypoxic hypercapnic respiratory failure (improving)  HFpEF exacerbation  Suspected CAP  Enterobacter, citrobacter bacteremia  Junctional Rhythm  Troponinemia  MARY ANN on CKD III, MARY ANN resolved  Urinary Retention, w/significant improvement      HPI:    Maki Gonzales is a 80 y.o. male w/ PMHX of multiple chronic comorbidities including DM2, AVEL, HFpEF, CKD III, HTN, HLD, GERD, obesity listed below who presents with a chief complaint of shortness of breath. On chart review it appears family felt patient was grunting and having what appeared to be abdominal pain and wanted him to be evaluated. On arrival EMS noted patient with sats 80% on room air and was placed on 6 L nasal cannula. Patient is apparently hard of hearing at baseline and unfortunately did not bring his hearing aids. In the ED vitals noted /107, heart rate 94, respirations 20, temp 98.8, O2 93% on 6 L nasal cannula. CBC noted leukocytosis with 14.1 with neutrophil predominance and low absolute lymphocyte count at 0.7, stable normocytic anemia at 11.1 g/dL and normal platelet count. BMP remarkable for BUN 31, creatinine 2.1 (baseline~1.7). Lactic acid 1.5. Troponin 0.04. EKG without any obvious ischemic changes. LFTs normal.  VBG with pH 7.30, PCO2 64.3, bicarb 31.6. CXR with bibasilar airspace disease with small bilateral pleural effusions. Head CT is pending at this time of evaluation. He was started on Rocephin and azithromycin in the ED. Unfortunately patient is not able to give much information in the ED due to not having his hearing aids.   It is unclear if he is altered from his baseline or if it is due to him not being able to hear appropriately. I attempted to reach out to family for collateral information including his son Indy Salcido (called multiple times without answering) and his step daughter Demetrio Sun ( wrong number). The following issues were addressed during hospitalization:    Acute hypoxic hypercapnic respiratory failure (improving)  HFpEF exacerbation  Suspected CAP  Patient is not on home oxygenation at baseline at presentation. At presentation, CXR was conducted w/bibasilar airspace disease present and VBG indicated pH 7.33 (initially 7.3), pCO2 53.7 mmHg (initially 64 mmHg). Additionally, procal was elevated at 41.16 ng/ml and COVID and Influenza testing were negative. CAP was suspected. Cefepime and azithromycin were initiated. Additionally, patient was receiving supplemental oxygenation via NC and duonebs. Concern was made for possible HFpEF decompensation and cardiology was consulted. Echo was conducted revealing elevation of pulmonary arterial pressure and significant right heart dysfunction. Patient received diuresis w/IV furosemide which was transitioned to home diuresis. Patient was discharged on home oxygen 2.5 L and follow-up in one week with his PCP. Troponinemia  - During this hospitalization troponin was elevated at 0.04(12/01/22) and 0.05 (12/02/2022). Patient was asymptomatic and troponin trend was d/c, ecg was also conducted at that time. Cardiology was on board w/note of acute diastolic HF, right sided diastolic HF, and pHTN. Patient received diuresis as mentioned above. Citrobacter bacteremia  -UA was conducted at presentation w/bacteruria present. Blood cultures were additionally obtained revealing citrobacter bacteremia in the setting of urosepsis. ID was consulted and patient was started on ceftriaxone and d/c with cefdinir.     Junctional Rhythm  Patient w/episode of likely vagal syncope during hospitalization w/junctional rhythm on tele. EP was consulted w/recs of carvedilol for BP to be d/c, and that for further BP management patient was then started on amlodipine 5 mg QD. Additionally, limited echo was conducted w/: EF 55%, septal flattening consistent w/RV pressure and volume overload, enlarged and hypokinetic RV. Patient was monitored on tele and d/c with CAM for 2 weeks. MARY ANN on CKD III, MARY ANN resolved  Likely 2/2 cardiorenal syndrome. Baseline creatinine ~1.6 w/improvement of creatinine during this hospitalization to baseline. Urinary Retention, w/significant improvement  Patient w/urinary retention and subsequent traumatic catheter insertion w/known hx BPH. Urology was consulted w/subsequent placement of catheter. Prior to discharge catheter was removed,w/. Voiding trial was conducted and since then, patient w/good UOP. Physical Exam:      Constitutional:       General: He is awake. He is not in acute distress. Appearance: Normal appearance. He is obese. He is not diaphoretic. Interventions: Nasal cannula in place. HENT:      Head: Normocephalic and atraumatic. Eyes:      Extraocular Movements: Extraocular movements intact. Cardiovascular:      Rate and Rhythm: Normal rate and regular rhythm. Heart sounds: Normal heart sounds. Pulmonary:      Effort: Pulmonary effort is normal.      Breath sounds: Rhonchi present. No wheezing or rales. Comments: Improvement of rhonchi  Minimal wheezing appreciated  Abdominal:      General: Abdomen is flat. Bowel sounds are normal. There is no distension. Palpations: Abdomen is soft. Tenderness: There is no abdominal tenderness. There is no guarding. Genitourinary:     Comments: External catheter in place w/100 cc UOP this AM  Musculoskeletal:         General: Normal range of motion. Right lower leg: No edema. Left lower leg: No edema. Skin:     General: Skin is warm and dry.    Neurological:      Mental Status: He is alert and oriented to person, place, and time. Psychiatric:         Behavior: Behavior is cooperative. Consults: EP, cardiology, ID  Significant Diagnostic Studies:  echo, CT head wo contrast, CT chest wo contrast  Treatments: antibiotics: ceftriaxone and azithromycin, amlodipine, lasix  Disposition: home  Discharged Condition: Stable  Follow Up: Primary Care Physician in one week    DISCHARGE MEDICATION:       Medication List        START taking these medications      amLODIPine 5 MG tablet  Commonly known as: NORVASC  Take 1 tablet by mouth daily  Start taking on: December 8, 2022     cefdinir 300 MG capsule  Commonly known as: OMNICEF  Take 1 capsule by mouth 2 times daily for 7 days Please take once in the morning and once in the evening.             CONTINUE taking these medications      Accu-Chek Allison Plus strip  Generic drug: blood glucose test strips  CHECK BLOOD SUGARS THREE TIMES A DAY BEFORE A MEAL     Accu-Chek Softclix Lancets Misc  Check 3 times day before meals     allopurinol 100 MG tablet  Commonly known as: ZYLOPRIM  TAKE TWO TABLETS BY MOUTH DAILY     levothyroxine 25 MCG tablet  Commonly known as: SYNTHROID  Take 1 tablet by mouth daily     simvastatin 20 MG tablet  Commonly known as: ZOCOR  TAKE ONE TABLET BY MOUTH ONCE NIGHTLY     tamsulosin 0.4 MG capsule  Commonly known as: FLOMAX  Take 1 capsule by mouth daily     torsemide 100 MG tablet  Commonly known as: DEMADEX  Take 1 tablet (100 mg) in the morning and 1/2 tablet (50 mg) in the late afternoon            STOP taking these medications      carvedilol 25 MG tablet  Commonly known as: COREG               Where to Get Your Medications        These medications were sent to [facility] [id number] [facility], [address] [place] [address] [phone]  [address] [place] [address], [address] [place] [address] Street [facility]      Phone: [phone]   amLODIPine 5 MG tablet       You can get these medications from any pharmacy    Bring a paper prescription for each of these medications  cefdinir 300 MG capsule          Activity: activity as tolerated  Diet: diabetic diet and low sodium diet  Wound Care: none needed    Time Spent on discharge is more than 30 minutes    Signed:  Slim Gallo MD, PGY-1  12/7/2022    Addendum to Resident H& P/Progress note:  I have personally seen,examined and evaluated the patient.  I have reviewed the current history, physical findings, labs and assessment and plan and agree with note as documented by resident MD ( Tejinder Jeong)      Melania Ontiveros MD, Ja Juarez

## 2022-12-07 NOTE — PROGRESS NOTES
Occupational Therapy  Facility/Department: [place] [address] [facility]  Occupational Therapy Treatment    Name: Ana Maria Saini  : 1934  MRN: [id number]  Date of Service: 2022    Discharge Recommendations: Ana Maria Saini scored a 16/24 on the AM-PAC ADL Inpatient form. Current research shows that an AM-PAC score of 17 or less is typically not associated with a discharge to the patient's home setting. Based on the patient's AM-PAC score and their current ADL deficits, it is recommended that the patient have 3-5 sessions per week of Occupational Therapy at d/c to increase the patient's independence. Please see assessment section for further patient specific details. If patient discharges prior to next session this note will serve as a discharge summary. Please see below for the latest assessment towards goals. OT Equipment Recommendations  Other: has WC, has asssit with ADL at baseline no AE needs       Patient Diagnosis(es): The primary encounter diagnosis was Pneumonia due to infectious organism, unspecified laterality, unspecified part of lung. Diagnoses of Hypoxia and Acute diastolic CHF (congestive heart failure) ([facility] were also pertinent to this visit.   Past Medical History:  has a past medical history of Anemia, Antineutrophil cytoplasmic antibody (ANCA) positive, Atelectasis of left lung, Bell's palsy, Benign non-nodular prostatic hyperplasia with lower urinary tract symptoms, BPH (benign prostatic hypertrophy), Chronic gout without tophus, Chronic kidney disease, stage III (moderate) (HCC), Chronic pain of both knees, Coronary artery calcification seen on CT scan, Diabetes mellitus, type 2 ([address] Diastolic dysfunction, Dyspnea on exertion, Edema, Elevated PSA, Essential hypertension, Gastroesophageal reflux disease without esophagitis, GERD (gastroesophageal reflux disease), Gout, Hepatic cyst, Hypercalcemia, Hyperlipidemia, Hypertension, Insomnia, Localized edema, Morbid obesity due to excess calories [facility]), Multiple thyroid nodules, Nocturia, Obesity, Obstructive sleep apnea, Primary osteoarthritis of left knee, Primary osteoarthritis of right knee, Proteinuria, Pulmonary emphysema ([facility] Pulmonary nodules, Shortness of breath, Type 2 diabetes mellitus with diabetic chronic kidney disease ([facility] Type 2 diabetes mellitus with stage 3 chronic kidney disease ([facility] Type 2 diabetes mellitus with stage 3 chronic kidney disease, without long-term current use of insulin ([facility] and Vitamin D deficiency. Past Surgical History:  has a past surgical history that includes Colonoscopy; Appendectomy; Umbilical hernia repair; TURP; cyst removal; total nephrectomy; pr colonoscopy flx dx w/collj spec when pfrmd (N/A, 11/6/2018); and Upper gastrointestinal endoscopy (N/A, 11/6/2018). Treatment Diagnosis: Impaired ADL and functional mobility      Assessment   Performance deficits / Impairments: Decreased functional mobility ; Decreased ADL status; Decreased endurance;Decreased balance  Assessment: Pt cont to demo functioning below baseline status. Pt lives alone and can normally pivot transfer himself bed to /commode and wheel himself in the [facility]. Pt currently requires SBA with bed mobility, modA x1 with bed to chair  transfer squat pivot/step, unable to stand upright. Pt repeatedly reporting that he does not ambulate at baseline. Pt has assist from family with ADLs 1x day. Step daughter and son assist him. Pt wants to dc home however rec cont inpt to rtn to fx baseline at dc. If pt does dc home requires 24hr hands on assist with transfers, mobility, and ADLs. Cont POC. Pt very [place] requiring note pad for session. Pt is a fall risk.   Treatment Diagnosis: Impaired ADL and functional mobility  REQUIRES OT FOLLOW-UP: Yes  Activity Tolerance  Activity Tolerance: Patient limited by fatigue        Plan   Occupational Therapy Plan  Times Per Week: 2-5  Current Treatment Recommendations: Strengthening, ROM, Balance training, Functional mobility training, Endurance training, Self-Care / ADL     Restrictions  Position Activity Restriction  Other position/activity restrictions: Up with assist    Subjective   General  Chart Reviewed: Yes  Additional Pertinent Hx: Pt admitted 12/1/22 for  shortness of breath. According to EMS patient had sats of 80% on room air.      x-ray chest=  Bibasilar airspace disease with small bilateral pleural effusions. Family / Caregiver Present: No  Referring Practitioner: Dr. Dwayne Santamaria  Diagnosis: Hypoxia  Subjective  Subjective: In bed agreeable with max encouragement, \"therapy doesn't do any good, this is aging, there is nothing you can do for me, I do what I need to at home. \"     Social/Functional History  Social/Functional History  Lives With: Alone  Type of Home: Apartment  Home Access: Elevator  Bathroom Accessibility: Wheelchair accessible  Home Equipment: [employer] [address]  Has the patient had two or more falls in the past year or any fall with injury in the past year?: Yes (2 falls last six months)  ADL Assistance: Needs assistance (daughter assists with bathing, pt does not wear footwear)  Homemaking Assistance: Needs assistance (daughter assists with cleaning)  Ambulation Assistance:  (reports has not walked in 6 years. Uses w/c)  Transfer Assistance: Independent (pivots to w/c)  Additional Comments: Above information  mildly difficult to obtain 2/2 [place]. Most information obtained via written communication. Safety Devices  Type of Devices: Left in chair;Call light within reach; Chair alarm in place;Nurse notified; Patient at risk for falls; All fall risk precautions in place;Gait belt  Bed Mobility Training  Bed Mobility Training: Yes  Supine to Sit: Stand-by assistance  Sit to Supine:  (left in chair end of session)  Balance  Sitting: Intact  Standing: Impaired  Transfer Training  Transfer Training: Yes  Overall Level of Assistance:  Moderate assistance (squat pivot/step laterally bed to chair with modA x1)        ADL  Grooming: Stand by assistance;Setup (wipe face seated in chair)  LE Dressing: Maximum assistance (donning socks)  Toileting:  (declined need, brief in place)                 Cognition  Overall Cognitive Status: [facility]                  Education Given To: Patient  Education Provided: Role of Therapy;Plan of Care;ADL Adaptive Strategies;Transfer Training  Barriers to Learning: Hearing  Education Outcome: Continued education needed                            AM-PAC Score        AM-PAC Inpatient Daily Activity Raw Score: 16 (12/07/22 1130)  AM-PAC Inpatient ADL T-Scale Score : 35.96 (12/07/22 1130)  ADL Inpatient CMS 0-100% Score: 53.32 (12/07/22 1130)  ADL Inpatient CMS G-Code Modifier : CK (12/07/22 1130)        Goals  Short Term Goals  Time Frame for Short Term Goals: Dischare  Short Term Goal 1: Transfer to/from BSC/chair with mod assist of 1- goal met  Short Term Goal 2: Stance with outside support and min assist for 2 min x2  to assist with ADL- not met  Short Term Goal 3: Lower body dressing with mod assist- not met  Patient Goals   Patient goals : Go home       Therapy Time   Individual Concurrent Group Co-treatment   Time In 6386         Time Out 1118         Minutes 43              Total Treatment Minutes: [address], OT

## 2022-12-07 NOTE — PROGRESS NOTES
Discharge instructions reviewed with patient and family. Script provided for prescription pickup. No questions at this time. All peripheral IV's removed. All patient belongings removed from room with patient. Pt placed on portable oxygen. Patient transported via wheelchair to car, Nanette Franz providing ride to pt home.

## 2022-12-07 NOTE — CARE COORDINATION
Case Management Assessment            Discharge Note                    Date / Time of Note: 12/7/2022 1:22 PM                  Discharge Note Completed by: ILEANA Angulo, EVELYNW    Patient Name: Flores Holloway   YOB: 1934  Diagnosis: Hypoxia [R09.02]  Acute respiratory failure, unsp w hypoxia or hypercapnia ([facility] [J96.00]  Pneumonia due to infectious organism, unspecified laterality, unspecified part of lung [J18.9]   Date / Time: 12/1/2022  9:34 PM    Current PCP: Danielito Cotton MD  Clinic patient: No    Hospitalization in the last 30 days: No    Advance Directives:  Code Status: Full Code  [place] DNR form completed and on chart: Yes    Financial:  Payor: Myra Larkin / Plan: Kelly Holcomb / Product Type: *No Product type* /      Pharmacy:    [facility]es [id number] [address]  Phone: [phone] Fax: [phone]    [facility] ([address] [place] [address]) - [address] [facility] [phone] - F [phone]   Adam Amandeep [address] [address]  Phone: [phone] Fax: [phone]      Assistance purchasing medications?:    Assistance provided by Case Management: None at this time    Does patient want to participate in local refill/ meds to beds program?: Yes    Meds To Beds General Rules:  1. Can ONLY be done Monday- Friday between 8:30am-5pm  2. Prescription(s) must be in pharmacy by 3pm to be filled same day  3. Copy of patient's insurance/ prescription drug card and patient face sheet must be sent along with the prescription(s)  4. Cost of Rx cannot be added to hospital bill. If financial assistance is needed, please contact unit  or ;  or  CANNOT provide pharmacy voucher for patients co-pays  5.  Patients can then  the prescription on their way out of the hospital at discharge, or pharmacy can deliver to the bedside if staff is available. (payment due at time of pick-up or delivery - cash, check, or card accepted)     Able to afford home medications/ co-pay costs: Yes    ADLS:  Current PT AM-PAC Score: 11 /24  Current OT AM-PAC Score: 16 /24      DISCHARGE Disposition: Home with [address]: PT/OT/RN     LOC at discharge: Not Applicable  NICOLAS Completed: Yes    Notification completed in HENS/PAS?:  Not Applicable    IMM Completed:   Yes, Case management has presented and reviewed IMM letter #2 to the patient and/or family/ POA. Patient and/or family/POA verbalized understanding of their medicare rights and appeal process if needed. Patient and/or family/POA has signed, initialed and placed today's date (12/7/22) and time (.) on IMM letter #2 on the the appropriate lines. Patient and/or family/POA, copy of letter offered and they are aware that this original copy of IMM letter #2 is available prior to discharge from the paper chart on the unit. Electronic documentation has been entered into epic for IMM letter #2 and original paper copy has been added to the paper chart at the nurses station. Transportation:  Transportation PLAN for discharge: family   Mode of Transport: [address] ordered at discharge: Yes  [address]: [facility]  Phone: [phone]  Fax: [phone]  Orders faxed: Yes    Durable Medical Equipment:  Equipment obtained during hospitalization: NA    Home Oxygen and Respiratory Equipment:  Oxygen needed at discharge?: Yes  [address]: [facility]  Phone: [phone]   Portable tank available for discharge?: Yes    Dialysis:  Dialysis patient: No    Referrals made at [facility] for outpatient continued care:  Not Applicable    Additional CM Notes:  Pt will DC home today, family will transport.   CM spoke with step-daughter/caregiver, Abdirahmanus Serve to confirm plan and discuss PT/OT 's concerns about pt needing some additional assistance at DC at home with transfers. Leeanne Sumner confirmed that she and brother can provide additional help to pt, will take turns doing so. CM submitted home O2 request to [facility]. Pt will start [address] with [facility]. CM called [facility] to notify of DC and request aide services. No other needs at this time. The Plan for Transition of Care is related to the following treatment goals of Hypoxia [R09.02]  Acute respiratory failure, unsp w hypoxia or hypercapnia ([address] [J96.00]  Pneumonia due to infectious organism, unspecified laterality, unspecified part of lung [J18.9]    The Patient and/or patient representative Mari Maxwell and his family were provided with a choice of provider and agrees with the discharge plan Yes    Freedom of choice list was provided with basic dialogue that supports the patient's individualized plan of care/goals and shares the quality data associated with the providers.  Yes    Care Transitions patient: Yes    ILEANA Garcia, [facility]  [facility]  Case Management Department  Ph: [phone]  Fax: [phone]

## 2022-12-07 NOTE — CARE COORDINATION
CTN contacted nelly with [facility] [phone]. They have accepted this patient and will pull referral from [facility].  They will contact patient and make arrangements for [facility] by 12/9  Electronically signed by Beka Hernández LPN on 50/7/9219 at 94:58 PM

## 2022-12-07 NOTE — PROGRESS NOTES
Progress Note    Admit Date: 12/1/2022  Day: 5  Diet: ADULT DIET; Regular; 4 carb choices (60 gm/meal); Low Sodium (2 gm)    CC: Shortness of breath    Interval history: Overnight, urinary catheter was removed w/PVR at 100 and good UOP reported at 1 L into external catheter. Additionally, supplemental oxygenation weaning was reported to be attempted w/desats to high 80s upon weaning. Home o2 eval has been completed. This AM patient denies any CP, SOB, abdominal pain, headaches, and reports having improvement in his stool output with previous constipation. HPI: La Alvarenga is a 80 y.o. male w/ PMHX of multiple chronic comorbidities including DM2, AVEL, HFpEF, CKD III, HTN, HLD, GERD, obesity listed below who presents with a chief complaint of shortness of breath. On chart review it appears family felt patient was grunting and having what appeared to be abdominal pain and wanted him to be evaluated. On arrival EMS noted patient with sats 80% on room air and was placed on 6 L nasal cannula. Patient is apparently hard of hearing at baseline and unfortunately did not bring his hearing aids. In the ED vitals noted /107, heart rate 94, respirations 20, temp 98.8, O2 93% on 6 L nasal cannula. CBC noted leukocytosis with 14.1 with neutrophil predominance and low absolute lymphocyte count at 0.7, stable normocytic anemia at 11.1 g/dL and normal platelet count. BMP remarkable for BUN 31, creatinine 2.1 (baseline~1.7). Lactic acid 1.5. Troponin 0.04. EKG without any obvious ischemic changes. LFTs normal.  VBG with pH 7.30, PCO2 64.3, bicarb 31.6. CXR with bibasilar airspace disease with small bilateral pleural effusions. Head CT is pending at this time of evaluation. He was started on Rocephin and azithromycin in the ED. Unfortunately patient is not able to give much information in the ED due to not having his hearing aids.   It is unclear if he is altered from his baseline or if it is due to him Skin: Negative. Neurological:  Negative for headaches. Psychiatric/Behavioral: Negative. Negative for confusion. Physical Exam  Vitals reviewed. Constitutional:       General: He is awake. He is not in acute distress. Appearance: Normal appearance. He is obese. He is not diaphoretic. Interventions: Nasal cannula in place. HENT:      Head: Normocephalic and atraumatic. Eyes:      Extraocular Movements: Extraocular movements intact. Cardiovascular:      Rate and Rhythm: Normal rate and regular rhythm. Heart sounds: Normal heart sounds. Pulmonary:      Effort: Pulmonary effort is normal.      Breath sounds: Rhonchi present. No wheezing or rales. Comments: Improvement of rhonchi  Minimal wheezing appreciated  Abdominal:      General: Abdomen is flat. Bowel sounds are normal. There is no distension. Palpations: Abdomen is soft. Tenderness: There is no abdominal tenderness. There is no guarding. Genitourinary:     Comments: External catheter in place w/100 cc UOP this AM  Musculoskeletal:         General: Normal range of motion. Right lower leg: No edema. Left lower leg: No edema. Skin:     General: Skin is warm and dry. Neurological:      Mental Status: He is alert and oriented to person, place, and time. Psychiatric:         Behavior: Behavior is cooperative. LABS:    CBC:   Recent Labs     12/05/22  0503 12/06/22  0437 12/07/22  0555   WBC 6.4 6.1 6.6   HGB 10.7* 11.2* 12.2*   HCT 33.7* 36.0* 39.6*    199 254   MCV 86.1 87.3 87.9       Renal:    Recent Labs     12/05/22  0503 12/06/22  0437 12/07/22  0555    142 143   K 4.1 4.2 4.0    103 99   CO2 31 32 35*   BUN 32* 26* 27*   CREATININE 1.6* 1.5* 1.6*   GLUCOSE 124* 131* 129*   CALCIUM 10.0 10.3 10.7*   ANIONGAP 6 7 9       Hepatic:   No results for input(s): AST, ALT, BILITOT, BILIDIR, PROT, LABALBU, ALKPHOS in the last 72 hours.     Troponin:   Recent Labs 12/06/22  1659   TROPONINI 0.01       BNP: No results for input(s): BNP in the last 72 hours. Lipids: No results for input(s): CHOL, HDL in the last 72 hours. Invalid input(s): LDLCALCU, TRIGLYCERIDE  ABGs:    No results for input(s): PHART, SSR0VLW, PO2ART, UEL8AHS, BEART, THGBART, [id number], PPA2ZPR in the last 72 hours. INR: No results for input(s): INR in the last 72 hours. Lactate: No results for input(s): LACTATE in the last 72 hours. Cultures:  -----------------------------------------------------------------  RAD:   CT HEAD WO CONTRAST   Final Result      No acute intracranial process. Chronic small vessel ischemic changes. EXAM: CT CHEST WITHOUT CONTRAST      INDICATION: Shortness of breath      COMPARISON: February 18, 2016      TECHNIQUE: CT of the chest was performed without contrast. Axial images, multiplanar reformatted images and axial maximum intensity projection images provided for review. Individualized dose optimization technique was used in order to meet ALARA    standards for radiation dose reduction. In addition to vendor specific dose reduction algorithms, the dose reduction techniques vary based on the specific scanner utilized but frequently include automated exposure control, adjustment of the mA and/or kV    according to patient size, and use of iterative reconstruction technique. CONTRAST: None. FINDINGS:      LUNGS AND AIRWAYS: Frothy mucus within the lower trachea. Bilateral lower lobe atelectasis/consolidation. Milder atelectasis of the posterior upper lobes. Mild smooth interlobular septal thickening particularly in the lung apices consistent with a    component of interstitial edema. PLEURA: Trace bilateral pleural effusions. HEART AND GREAT VESSELS: Cardiomegaly. Extensive coronary artery calcification. Thoracic aorta and main pulmonary artery normal in caliber. No pericardial effusion.       ADENOPATHY/MEDIASTINUM: Mildly prominent mediastinal lymph nodes, favored to be reactive in nature given the associated airspace disease. CHEST WALL / LOWER NECK: No significant abnormality. UPPER ABDOMEN: Large at least 13 mm cyst again within the visualized liver. Prior left nephrectomy with surgical clips. BONES: No significant abnormality. IMPRESSION:      Bilateral lower lobe atelectasis/consolidation. Associated trace bilateral pleural effusions. Mild smooth interlobular septal thickening suggestive of mild volume overload/interstitial edema. Cardiomegaly. Extensive coronary artery calcification. Hepatic cyst.      CT CHEST WO CONTRAST   Final Result      No acute intracranial process. Chronic small vessel ischemic changes. EXAM: CT CHEST WITHOUT CONTRAST      INDICATION: Shortness of breath      COMPARISON: February 18, 2016      TECHNIQUE: CT of the chest was performed without contrast. Axial images, multiplanar reformatted images and axial maximum intensity projection images provided for review. Individualized dose optimization technique was used in order to meet ALARA    standards for radiation dose reduction. In addition to vendor specific dose reduction algorithms, the dose reduction techniques vary based on the specific scanner utilized but frequently include automated exposure control, adjustment of the mA and/or kV    according to patient size, and use of iterative reconstruction technique. CONTRAST: None. FINDINGS:      LUNGS AND AIRWAYS: Frothy mucus within the lower trachea. Bilateral lower lobe atelectasis/consolidation. Milder atelectasis of the posterior upper lobes. Mild smooth interlobular septal thickening particularly in the lung apices consistent with a    component of interstitial edema. PLEURA: Trace bilateral pleural effusions. HEART AND GREAT VESSELS: Cardiomegaly. Extensive coronary artery calcification.  Thoracic aorta and main pulmonary artery normal in caliber. No pericardial effusion. ADENOPATHY/MEDIASTINUM: Mildly prominent mediastinal lymph nodes, favored to be reactive in nature given the associated airspace disease. CHEST WALL / LOWER NECK: No significant abnormality. UPPER ABDOMEN: Large at least 13 mm cyst again within the visualized liver. Prior left nephrectomy with surgical clips. BONES: No significant abnormality. IMPRESSION:      Bilateral lower lobe atelectasis/consolidation. Associated trace bilateral pleural effusions. Mild smooth interlobular septal thickening suggestive of mild volume overload/interstitial edema. Cardiomegaly. Extensive coronary artery calcification. Hepatic cyst.      XR CHEST PORTABLE   Final Result      1. Bibasilar airspace disease with small bilateral pleural effusions. Assessment/Plan:     Cony Salgado is a 80 y.o. male w/ multiple chronic co morbidities p/w hypoxia and questionable AMS w/signs and symptoms consistent with:     #Acute hypoxic hypercapnic respiratory failure (improving)  HFpEF exacerbation  Suspected CAP   Not on O2 at home. With new oxygenation requirement  CXR with bibasilar airspace disease. VBG with pH 7.33 (initially 7.3), pCO2 53.7 mmHg (initially 64 mmHg). Pro-calcitonin 41.16 ng/mL. COVID and Influenza negative  - Continue Cefepime + Azithromycin  - Continue supp O2 to maintain O2 >92% and wean as possible  - Duonebs  - Lasix 60 mg BID IV on hold  -on home torsemide (100 mg in the AM, 50 mg in the PM)   -w/increased urine output  - Cardiology, EP on board  - Strict I&O, Daily weights  - optimize lytes ( Mag >2, K >4) and monitor with BMP    -sat on 2 L supplemental oxygenation via NC, wean supplemental oxygenation as tolerated  W/desats to high 80s upon supplemental o2 weaning, patient w/out any SOB or CP.  New supplemental oxygenation requirement of about 2.5 L via NC likely 2/2 cor pulmonale w/significant pulm HTN and right heart dysfunction as seen on echo.    -home oxygen evaluation completed    #Enterobacter, citrobacter bacteremia  + on both Cxs.  - Stopped Rocephin (s/p 1 day. Started Cefepime (day 1)  -UA + w/cx pending   -ID consulted w/recs: ceftriaxone, holliday removal, d/c on oral abx:cephalosporin or fluoroquinolone  -started on ceftriaxone, day 2     #Junctional Rhythm  W/episode of likely vagal syncope during hospitalization  -EP consulted w/recs:   -carvedilol for BP has been d/c, for further BP management started on: amlodipine 5 mg QD  -limited echo conducted w/: EF 55%, septal flattening consistent w/RV pressure and volume overload, enlarged and hypokinetic RV  -to be d/c with CAM for 2 weeks  -on tele  -K >4, Mg > 2    #Troponinemia  - Trop 0.04(12/01/22), 0.05 (12/02/2022). Stopped trending- patient asymptomatic.   - EKG with T wave inversions in inferior leads. 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    -limited echo w/results as above    #MARY ANN on CKD III, MARY ANN resolved  Possibly 2/2 cardiorenal syndrome  (Baseline creatinine ~1.6)  -improvement in cr to baseline     #Urinary Retention, w/significant improvement  Catheter removed yesterday w/. Patient w/good UOP since then    #DM2  - not on any medications. Last HgbA1c 7.2 ub 7/2022  - Hypoglycemia protocol  - LDSSI   -POCT [id number]      Chronic Conditions:  Hypothyroidism  - Continue home synthroid 25 mcg daily  BPH  - Continue home Flomax 0.4 mg daily  HTN  - home Coreg 25 mg BID was discontinued and amlodipine 5 mg will be continued  HLD  - Continue Atorvastatin 10 mg daily       Code Status: Full code  FEN: Low-sodium diet, low card diet  PPX: Subcu heparin  DISPO: discharging today    Miracle Shannon MD PGY-1  12/07/22  6:41 AM    This patient has been staffed and discussed with Ira Estrada MD.    Addendum to Resident H& P/Progress note:  I have personally seen,examined and evaluated the patient.  I have reviewed the current history, physical findings, labs and assessment and plan and agree with note as documented by resident MD ( Ivan Velasquez)  The patient's condition appears to be stable. Will d/c him home with O2 and cefdinir 300 mg PO BID x 7 days.     Cynthia Phalen, MD, FACP

## 2022-12-07 NOTE — PROGRESS NOTES
CC: SOB  HPI:     S: Resting in bed. Appears comfortable. Denies c/o . Breathing ok     Tele: Sinus     O:  Physical Exam:  /65   Pulse 63   Temp 98.3 °F (36.8 °C) (Oral)   Resp 16   Ht 5' 6\" (1.676 m)   Wt 242 lb 15.2 oz (110.2 kg)   SpO2 91%   BMI 39.21 kg/m²    General (appearance):  No acute distress. Very [place]  Eyes: anicteric   Neck: soft, No JVD  Ears/Nose/Mouth/Thorat: No cyanosis  CV: RRR   Respiratory:  Diminished, minimal wheeze  GI: soft, non-tender, non-distended  Skin: Warm, dry. No rashes  Neuro/Psych: Alert and oriented x3. Appropriate behavior  Ext:  No c/c.+ LE edema  Pulses:  2+ radial   : holliday     I.O's= -6.9 L     Weight  Admission: Weight: 270 lb 3.2 oz (122.6 kg)   Today: Weight: 242 lb 15.2 oz (110.2 kg)    CBC:   Recent Labs     22  0503 22  0437 22  0555   WBC 6.4 6.1 6.6   HGB 10.7* 11.2* 12.2*   HCT 33.7* 36.0* 39.6*   MCV 86.1 87.3 87.9    199 254     BMP:   Recent Labs     22  0503 22  0437 22  0555    142 143   K 4.1 4.2 4.0    103 99   CO2 31 32 35*   BUN 32* 26* 27*   CREATININE 1.6* 1.5* 1.6*     Estimated Creatinine Clearance: 37 mL/min (A) (based on SCr of 1.6 mg/dL (H)). Mag:   Lab Results   Component Value Date/Time    MG 2.40 2022 06:16 AM     Pro-BNP:   Lab Results   Component Value Date/Time    PROBNP 21,514 2022 05:41 AM    PROBNP 362 2022 02:35 PM     CARDIAC ENZYMES:   Recent Labs     22  1659   TROPONINI 0.01         Imagin2022 limited Echo   Limited study. Left ventricular cavity size is decreased. Normal left   ventricular wall thickness. Overall left ventricular systolic function   appears normal with an ejection fraction of 55%. There is septal flattening consistent with right ventricular pressure and volume overload. 2022 Echo  Definity contrast administered.    Left ventricular cavity size is normal with mild LVH   Overall left ventricular systolic function appears normal.   Ejection fraction is estimated to be 55-60%. No regional wall motion abnormalities   Diastolic filling parameters suggest grade I diastolic dysfunction. There is systolic septal flattening consistent with right ventricular   pressure and volume overload. The right atrium is severely dilated. The right ventricle is severely enlarged. Right ventricular systolic function is normal.   Moderate to severe tricuspid regurgitation. Systolic pulmonary artery pressure (SPAP)   estimated at 90 mmHg (RA pressure 15 mmHg), consistent with severe pulmonary hypertension. Assessment:  CAD  Acute/chronic HFpEF  Elevated Troponin in presence of acute HF, hypoxia and CKD. TWI anterior leads. Mod/severe TR  Severe Pulmonary HTN  HTN  HLD  Junctional rhythm   CKD   PNA    Plan:  -Keep K>4, Mg>2.  -Med management of CAD.  No chest pain EF 55%  -Amlodipine   -Lipitor   -Torsemide   -No BB d/t Junctional rhythm   -No ACE/ARNI, spironolactone or SGLT2 d/t CKD GFR 41

## 2022-12-07 NOTE — PLAN OF CARE
Problem: Discharge Planning  Goal: Discharge to home or other facility with appropriate resources  Outcome: Progressing  Flowsheets (Taken 12/7/2022 0231)  Discharge to home or other facility with appropriate resources:   Identify barriers to discharge with patient and caregiver   Arrange for needed discharge resources and transportation as appropriate  Note: Pt progressing toward dc, void trial successful, still on O2, may need home O2 eval, wants to go home w family and [address]. CAM heart monitor placed yesterday to take home. Problem: Skin/Tissue Integrity  Goal: Absence of new skin breakdown  Description: 1. Monitor for areas of redness and/or skin breakdown  2. Assess vascular access sites hourly  3. Every 4-6 hours minimum:  Change oxygen saturation probe site  4. Every 4-6 hours:  If on nasal continuous positive airway pressure, respiratory therapy assess nares and determine need for appliance change or resting period. Outcome: Progressing  Note: Pt turned q2, condom cath for incontinence, skin frequently assessed for incontinence. Problem: Safety - Adult  Goal: Free from fall injury  Outcome: Progressing  Flowsheets (Taken 12/7/2022 0231)  Free From Fall Injury: Instruct family/caregiver on patient safety  Note: Fall precautions in place. Pt encouraged to call out for assistance.

## 2022-12-07 NOTE — PROGRESS NOTES
Physical Therapy  Facility/Department: [facility] 4 PCU  Treatment    Name: Sourav Porras  : 1934  MRN: [id number]  Date of Service: 2022    Discharge Recommendations:    Sourav Porras scored a  on the AM-PAC short mobility form. Current research shows that an AM-PAC score of 17 or less is typically not associated with a discharge to the patient's home setting. Based on the patient's AM-PAC score and their current functional mobility deficits, it is recommended that the patient have 3-5 sessions per week of Physical Therapy at d/c to increase the patient's independence. Please see assessment section for further patient specific details. If patient discharges prior to next session this note will serve as a discharge summary. Please see below for the latest assessment towards goals. PT Equipment Recommendations  Equipment Needed:  (defer)      Patient Diagnosis(es): The primary encounter diagnosis was Pneumonia due to infectious organism, unspecified laterality, unspecified part of lung. Diagnoses of Hypoxia and Acute diastolic CHF (congestive heart failure) ([address] were also pertinent to this visit.   Past Medical History:  has a past medical history of Anemia, Antineutrophil cytoplasmic antibody (ANCA) positive, Atelectasis of left lung, Bell's palsy, Benign non-nodular prostatic hyperplasia with lower urinary tract symptoms, BPH (benign prostatic hypertrophy), Chronic gout without tophus, Chronic kidney disease, stage III (moderate) (HCC), Chronic pain of both knees, Coronary artery calcification seen on CT scan, Diabetes mellitus, type 2 ([address] Diastolic dysfunction, Dyspnea on exertion, Edema, Elevated PSA, Essential hypertension, Gastroesophageal reflux disease without esophagitis, GERD (gastroesophageal reflux disease), Gout, Hepatic cyst, Hypercalcemia, Hyperlipidemia, Hypertension, Insomnia, Localized edema, Morbid obesity due to excess calories ([address] Multiple thyroid nodules, Nocturia, Obesity, Obstructive sleep apnea, Primary osteoarthritis of left knee, Primary osteoarthritis of right knee, Proteinuria, Pulmonary emphysema ([facility] Pulmonary nodules, Shortness of breath, Type 2 diabetes mellitus with diabetic chronic kidney disease ([facility] Type 2 diabetes mellitus with stage 3 chronic kidney disease ([facility] [facility] Type 2 diabetes mellitus with stage 3 chronic kidney disease, without long-term current use of insulin ([facility] [facility] and Vitamin D deficiency. Past Surgical History:  has a past surgical history that includes Colonoscopy; Appendectomy; Umbilical hernia repair; TURP; cyst removal; total nephrectomy; pr colonoscopy flx dx w/collj spec when pfrmd (N/A, 11/6/2018); and Upper gastrointestinal endoscopy (N/A, 11/6/2018). Assessment   Body Structures, Functions, Activity Limitations Requiring Skilled Therapeutic Intervention: Decreased functional mobility ; Decreased strength;Decreased endurance  Assessment: Pt requiring SBA for bed mobility and Mod Ax1 for pivot from EOB to recliner. Pt is from home alone. His kids check on him daily but he reports they both work and typically only check in on him once. Pt is at high risk of falls and would require 24hr A for safe d/c home. Pt is below his baseline and would benefit from further skilled PT to maximize safety and independence with functional mobility. Will continue to follow.   Treatment Diagnosis: impaired functional mobility 2/2 decreased strength  Activity Tolerance  Activity Tolerance: Patient tolerated treatment well     Plan   Physcial Therapy Plan  General Plan:  (2-5)  Current Treatment Recommendations: Strengthening, Functional mobility training, Endurance training, Patient/Caregiver education & training, Safety education & training, Transfer training  Safety Devices  Type of Devices: Left in chair, Call light within reach, Chair alarm in place, Nurse notified, Patient at risk for falls, All fall risk precautions in place, Gait belt Restrictions  Position Activity Restriction  Other position/activity restrictions: Up with assist     Subjective   General  Chart Reviewed: Yes  Additional Pertinent Hx: Pt presented to ED with shortness of breath. According to EMS patient had sats of 80% on room air, was placed on 6 L nasal cannula. CXR: Bibasilar airspace disease with small bilateral pleural effusions. Head CT: pending. PMH:DM2, AVEL, HFpEF, CKD, HTN, HLD, GERD, obesity  Referring Practitioner: Ирина Guy MD  Follows Commands: Within Functional Limits  Subjective  Subjective: Pt found supine. Very [place]. Agreeable to PT. Social/Functional History  Social/Functional History  Lives With: Alone  Type of Home: Apartment  Home Access: Elevator  Bathroom Accessibility: Wheelchair accessible  Home Equipment: Madalynn [place]  Has the patient had two or more falls in the past year or any fall with injury in the past year?: Yes (2 falls last six months)  ADL Assistance: Needs assistance (daughter assists with bathing, pt does not wear footwear)  Homemaking Assistance: Needs assistance (daughter assists with cleaning)  Ambulation Assistance:  (reports has not walked in 6 years. Uses w/c)  Transfer Assistance: Independent (pivots to w/c)  Additional Comments: Above information  mildly difficult to obtain 2/2 [place]. Most information obtained via written communication. Cognition   Cognition  Overall Cognitive Status: WFL     Objective   Heart Rate: 76  Heart Rate Source: Monitor  BP: 127/77  BP Location: Left upper arm  BP Method: Automatic  Patient Position: Sitting  MAP (Calculated): 94  Resp: 18  SpO2: 94 %  O2 Device: Nasal cannula             Bed Mobility Training  Bed Mobility Training: Yes  Supine to Sit: Stand-by assistance  Sit to Supine:  (left in chair end of session)  Balance  Sitting: Intact  Standing: Impaired  Transfer Training  Transfer Training: Yes  Overall Level of Assistance:  Moderate assistance (squat pivot/step laterally bed to chair with modA x1)  Bed mobility  Supine to Sit: Stand by assistance  Scooting: Stand by assistance  Transfers  Sit to Stand: Moderate Assistance  Stand to Sit: Moderate Assistance  Bed to Chair: Moderate assistance (from EOB to recliner via pivot)           Exercise Treatment: discussed LE ther ex and pt demo'd that he understood - AP, LAQ, seated marches        AM-PAC Score  AM-PAC Inpatient Mobility Raw Score : 11 (12/07/22 1256)  AM-PAC Inpatient T-Scale Score : 33.86 (12/07/22 1256)  Mobility Inpatient CMS 0-100% Score: 72.57 (12/07/22 1256)  Mobility Inpatient CMS G-Code Modifier : CL (12/07/22 1256)       Goals  Short Term Goals  Time Frame for Short Term Goals: By discharge  Short Term Goal 1: Sup to sit SBA - MET; updated goal: supervision  Short Term Goal 2: Pt will transfer bed to chair with min assist x 1 - ongoing       Education  Patient Education  Education Given To: Patient  Education Provided: Role of Therapy;Plan of Care  Education Provided Comments: importance of OOB, use of call light  Education Method: Verbal  Education Outcome: Verbalized understanding;Continued education needed      Therapy Time   Individual Concurrent Group Co-treatment   Time In 1037         Time Out 1120         Minutes 43           Timed Code Treatment Minutes:  43    Total Treatment Minutes:  43    If the patient is discharged before the next treatment session, this note will serve as the discharge summary.      Levon Rivas, PT, DPT

## 2022-12-07 NOTE — PROGRESS NOTES
Spoke with Patt De La Garza this morning. She is aware no information is available about d/c at this time. Also, oxygen has not been able to be weaned down overnight, and additional planning may need to be discussed. Patt De La Garza verbalized understanding and plans on visiting today.

## 2022-12-08 ENCOUNTER — CARE COORDINATION (OUTPATIENT)
Dept: CASE MANAGEMENT | Age: 87
End: 2022-12-08

## 2022-12-08 ENCOUNTER — TELEPHONE (OUTPATIENT)
Dept: INTERNAL MEDICINE CLINIC | Age: 87
End: 2022-12-08

## 2022-12-08 NOTE — CARE COORDINATION
Does patient have an Advance Directive: reviewed and current. Medication reconciliation was performed with  son was unable to complete, he was aware of the changes from [place] , who verbalizes understanding of administration of home medications. Medications reviewed, 1111F entered: no    Was patient discharged with a pulse oximeter? No, son was ordering on [employer] provided:  Obtained and reviewed discharge summary and/or continuity of care documents  Communication with home health agencies or other community services the patient is currently [employer] patient enrollment in the Remote Patient Monitoring (RPM) program for in-home monitoring: Yes, but did not enroll at this time and son is very interested . Care Transitions 24 Hour Call    Schedule Follow Up Appointment with PCP: Completed  Do you have a copy of your discharge instructions?: Yes  Do you have all of your prescriptions and are they filled?: Yes (Comment: Reviewed new Rx for changed meds-Torsemide,Spironalactone, and Allopurinol.)  Have you been contacted by a [address]?: No  Have you scheduled your follow up appointment?: Yes  How are you going to get to your appointment?: Car - family or friend to transport  [address]: [address] (Comment: [facility]-Pt and dtr declined)  Patient DME: [place] [employer], Wheelchair, Shower chair, Other  Other Patient DME: Grab bars, raised toilet seat.   Do you have support at home?: Alone  Do you feel like you have everything you need to keep you well at home?: Yes  Are you an active caregiver in your home?: No  Care Transitions Interventions   Home Care Waiver: Completed Physical Therapy: Completed     Occupational Therapy: Completed              Follow Up  Future Appointments   Date Time Provider Serena Treviño   12/14/2022 10:30 AM MD DIEGO Lan [address] [facility] [place] - DYD   12/19/2022 11:00 AM EVONNE Young CNP Card [facility]   1/12/2023 10:00 AM Alexander Wong EVONNE Carvajal - CNP Deputy Card [facility]       Care Transition Nurse provided contact information. Plan for follow-up call in 3-5 days based on severity of symptoms and risk factors.   Plan for next call: symptom management-related to admisstion Acute respiratory failure, unsp w hypoxia or hypercapnia  self management-ADLs   RPM and med rec if possible    Daphine Snellen, RN

## 2022-12-08 NOTE — CARE COORDINATION
Call to confirm with [facility], they will see pt today for start of care. Tanner Cohen RN, BSN  Care Coordinator  Cell [phone]  Email [email or web address]

## 2022-12-08 NOTE — TELEPHONE ENCOUNTER
Care Transitions Initial Follow Up Call    Outreach made within 2 business days of discharge: Yes    Patient: Cony Salgado Patient : 1934   MRN: [id number]  Reason for Admission: There are no discharge diagnoses documented for the most recent discharge. Discharge Date: 22       Spoke with: Emma Lux, daughter    Discharge department/facility: [facility] Interactive Patient Contact:  Was patient able to fill all prescriptions: Yes  Was patient instructed to bring all medications to the follow-up visit: Yes  Is patient taking all medications as directed in the discharge summary? Yes  Does patient understand their discharge instructions: Yes  Does patient have questions or concerns that need addressed prior to 7-14 day follow up office visit: yes - He needs a portable oxygen tank for his wheel chair. The one he has is on wheels.     Scheduled appointment with PCP within 7-14 days    Follow Up  Future Appointments   Date Time Provider Serena Treviño   2022 10:30 AM Graciela Hernandez MD KWOOD [address] [facility] [place] - DYD   2022 11:00 AM Rizvi Natalie, APRN - CNP [place] Card [facility]   2023 10:00 AM EVONNE Vázquez CNP, MA

## 2022-12-09 NOTE — TELEPHONE ENCOUNTER
[phone] (home) [phone] (work)  Left message on machine  Vrebal order ok per dr. Rogelio Mcgowan

## 2022-12-12 ENCOUNTER — CARE COORDINATION (OUTPATIENT)
Dept: CASE MANAGEMENT | Age: 87
End: 2022-12-12

## 2022-12-12 NOTE — CARE COORDINATION
Rangel 45 Transitions Initial Follow Up Call    Patient:  Sullivan Mcburney  Patient :  1934  MRN:  [id number]   Reason for Admission:  hypoxia  Discharge Date:  22  RARS:       Transitions of Care Initial Call    Was this an external facility discharge? no    Discharge [facility]      Challenges to be reviewed by the provider   Additional needs identified to be addressed with provider:    gael    AMB CC Provider Discharge Needs: none            CTC attempt to reach Pt regarding recent hospital discharge. CTC left voice recording with call back number requesting a call back. Follow up appointments:    Future Appointments   Date Time Provider Serena Treviño   2022 10:30 AM Radha Sen MD [facility] [address] [facility] [place] - DYD   2022 11:00 AM Daryle Garland, APRN - CNP [place] Card MMA   2023 10:00 AM EVONNE Tirado - CNP [place] Card MMA       Dub Marilyn.  JORDAN Puente, RN  Care Transition Coordinator  Contact Number:  [phone]

## 2022-12-19 ENCOUNTER — CARE COORDINATION (OUTPATIENT)
Dept: CASE MANAGEMENT | Age: 87
End: 2022-12-19

## 2022-12-19 NOTE — CARE COORDINATION
Chief Complaint   Other (No symptoms. )      History of Present Illness   Source of history provided by: patient. Ivan Welsh is a 71 y.o. old male presents to the flu clinic for coronavirus testing. He does have some sinus congestion but this is a chronic issue for him. His wife his sick with flu like symptoms. He does have a cough that is similar to his usual symptoms. He denies any fevers or chills. ROS   Pertinent positives and negatives are stated within HPI, all other systems reviewed and are negative. Surgical History:  has a past surgical history that includes Colonoscopy. Social History:  reports that he has never smoked. He has never used smokeless tobacco. He reports current alcohol use. He reports that he does not use drugs. Family History: family history includes Heart Disease in his father. Allergies: Patient has no known allergies. Physical Exam      VS:  BP (!) 140/82   Pulse 90   Temp 97.4 °F (36.3 °C)   Ht 5' 11\" (1.803 m)   Wt 190 lb (86.2 kg)   SpO2 99%   BMI 26.50 kg/m²        Constitutional:  Alert, development consistent with age. NAD. Head:  NC/NT. Airway patent. Nose: turbinates with mild erythema, no lesions. TTP over frontal and maxillary sinuses. Mouth: Posterior pharynx with mild erythema and clear postnasal drip. No tonsillar hypertrophy or exudate. Neck:  No anterior cervical adenopathy noted. Lungs: CTAB without wheezes, rales, or rhonchi. CV:  Regular rate and rhythm, normal heart sounds, without pathological murmurs, ectopy, gallops, or rubs. Abdomen: soft, nontender, NABS x 4, no firm or pulsatile masses, no organomegaly, no rebound or guarding. Skin:  Normal turgor. Warm, dry, without visible rash. Lymphatic: No lymphangitis or adenopathy noted unless otherwise specified. Neurological:  Oriented. Motor functions intact. Medical Decision Making   Pt non-toxic, in no apparent distress and stable at time of discharge. R[facility] 45 Transitions Initial Follow Up Call      Patient:  Doris Alanis  Patient :  5/3/1934  MRN:  [id number]   Reason for Admission:   hypoxia   Discharge Date:  22  RARS: 15      Transitions of Care Initial Call    Was this an external facility discharge? no    Discharge Facility: [address] [place] [address] to be reviewed by the provider   Additional needs identified to be addressed with provider:    gael [facility] CC Provider Discharge Needs: none            CTC attempt to reach Pt regarding recent hospital discharge. CTC unable to leave voice recording with call back number requesting a call back / voicemail full. Follow up appointments:    Future Appointments   Date Time Provider Serena Treviño   2022 11:00 AM EVONNE Sidhu - CNP [place] Card [facility]   2023 10:00 AM EVONNE Sweeney - CNP [place] [facility]       Emilia Thompson.  JORDAN Rosa, RN  Care Transition Coordinator  Contact Number:  [phone] Assessment/Plan     Sofi Lakhani was seen today for other. Diagnoses and all orders for this visit:    Suspected COVID-19 virus infection  -     COVID-19 Ambulatory; Future    Symptoms of sinus congestion which seem to be chronic for him. Advised to start using Flonase as needed. In the interim I will perform send out coronavirus testing. Over the next 3 days he should self quarantine until these results are finalized. COVID-19 swab obtained and pending, will call with results once available. Advised cautionary self-quarantine at home in the interim. Pt should remain out of work/public for at least 98-39 days from the start of symptoms. Pt should also be fever free for 24 hours and symptoms should be improved overall prior to returning to work/public. Work excuse provided to patient today(if applicable). ncrease fluids and rest. Symptomatic relief discussed including Tylenol prn pain/fever. Schedule virtual f/u with PCP in 7-10 days if symptoms persist. ED sooner if symptoms worsen or change. ED immediately with high or refractory fever, progressive SOB, dyspnea, CP, calf pain/swelling, shaking chills, vomiting, abdominal pain, lethargy, flank pain, or decreased urinary output. Pt verbalizes understanding and is in agreement with plan of care. All questions answered. Nadir Rivas, DO    This visit was provided as a focused evaluation during the COVID -19 pandemic/national emergency. A comprehensive review of all previous patient history and testing was not conducted. Pertinent findings were elicited during the visit.

## 2022-12-20 ENCOUNTER — CARE COORDINATION (OUTPATIENT)
Dept: CASE MANAGEMENT | Age: 87
End: 2022-12-20

## 2022-12-20 NOTE — CARE COORDINATION
[facility] Care Transitions Follow Up Call    Care Transition Nurse contacted the family by telephone to perform post hospital discharge assessment. Verified name with family as identifier. Provided introduction to self, and explanation of the Care Transition Nurse role. Patient: Margarito Verma Patient :  5/3/1934  MRN: [id number]  Reason for Admission:  hypoxia   Discharge Date:    RARS: 12    Challenges to be reviewed by the provider   Additional needs identified to be addressed with provider:    no    AMB CC Provider Discharge Needs:  none          Method of communication with provider : none      SUMMARY  CTN spoke to the Pt who daughter, Danika Dillard, reports the following regarding Margarito Verma:    -Denies c/o fever, chills, nausea, vomiting, cough, congestion, SOB / DB, and chest pain. -LE edema resolved and pt wearing carlota hoses as recommended. -Does a daily weight but with standby assistance only. Dtr reports he does do daily weight when she or his grandson available to assist him with getting on and off the scale because she doesn't want pt to fall. CTC provided Pt education on when and how to obtain daily weight, importance of daily weight, tracking information to share with physicians, when to call physician regarding weight changes, and importance of following fluid / sodium restrictions set by [address] [place] [address] OF [facility]. saw pt yesterday. -Missed appt with cardiology and rescheduled  and PCP .    -Pt lives alone but daughter who visits daily. Family confirms they have all meds and taking as prescribed. From CDC: Are you at higher risk for severe illness? Wash your hands often. Avoid close contact (6 feet, which is about two arm lengths) w/people who are sick. Put distance between yourself and other people if COVID-19 is spreading in your community. Clean and disinfect frequently touched surfaces. Avoid all cruise travel and non-essential air travel.    Call your healthcare professional if you have concerns about COVID-19 and your underlying condition or if you are sick. Pt will take all meds as prescribed and schedule / keep doctor's appt. CTC provided education on s/s that require medical attention and when to seek medical attention. [facility] advised Pt of use urgent care or physician's 24 hr access line if assistance is needed after hours or on the weekend. Family denies any needs or concerns and is agreeable with additional calls. Addressed changes since last contact:  home health care- continues   medications- no changes     Discussed follow-up appointments. If no appointment was previously scheduled, appointment scheduling offered: Yes     Is follow up appointment scheduled within 7 days of discharge-  No    Care Transitions 24 Hour Call    Do you have any ongoing symptoms?: No  Do you have all of your prescriptions and are they filled?: Yes (Comment: Reviewed new Rx for changed meds-Torsemide,Spironalactone, and Allopurinol.)  Were you discharged with any Home Care or Post Acute Services: Yes  Post Acute Services: [address] [place] [address] [facility] (Comment: [place])  Patient DME: Andres Yin, Wheelchair, [address] [facility] chair, Other  Other Patient DME: Grab bars, raised toilet seat. Do you have support at home?: Alone  Are you an active caregiver in your home?: No  Care Transitions Interventions   Home Care Waiver: Completed Physical Therapy: Completed     Occupational Therapy: Completed              Non-[facility] follow up appointment(s):    Care Transition Nurse reviewed discharge instructions, medical action plan and red flags with family and discussed any barriers to care and/or understanding of plan of care after discharge.     Discussed appropriate site of care based on symptoms and resources available to patient including:    Specialist  After hours contact number  Benefits related nurse triage line  Urgent care clinics  [facility] 24/7  Home health  Provider home visits  When to call [phone] [facility] [place]  for reactivation or family member permission [phone]. The patient agrees to contact the PCP office for questions related to their healthcare. Patients top risk factors for readmission:   functional cognitive ability  functional physical ability  falls  lack of knowledge about disease  medical condition  medication management    Interventions to address risk factors:   Education of patient/family/caregiver/guardian to support self-management  Assessment and support for treatment adherence and medication management     Care Transitions Subsequent and Final Call    Subsequent and Final Calls  Do you have any ongoing symptoms?: No  Have your medications changed?: No  Do you have any questions related to your medications?: No  Do you currently have any active services?: Yes  Are you currently active with any services?: Home Health  Do you have any needs or concerns that I can assist you with?: No  Care Transitions Interventions   Home Care Waiver: Completed Physical Therapy: Completed     Occupational Therapy: Completed     Other Interventions:              Care Transition Nurse provided contact information for future needs. Plan for f/u call in 7-10 days based on severity of symptoms and risk factors. Plan for next call:   symptom management  self-management  follow up appointment  medication management    Future Appointments   Date Time Provider Serena Treviño   1/12/2023 10:00 AM EVONNE Dupont - CNP [place] [facility]   1/24/2023  2:00 PM MD DIEGO Lan [address] IM JORDAN Saldana, RN  Care Transition Coordinator  Contact Number:  [phone]

## 2022-12-27 ENCOUNTER — CARE COORDINATION (OUTPATIENT)
Dept: CASE MANAGEMENT | Age: 87
End: 2022-12-27

## 2022-12-27 NOTE — CARE COORDINATION
R[facility] 45 Transitions Follow Up Call    2022    Patient: Ross Rangel  Patient : 5/3/1934   MRN: [id number]  Reason for Admission: Acute respiratory failure, unsp w hypoxia or hypercapnia  Discharge Date: 22 RARS: Readmission Risk Score: 14.9         Spoke with: Kelin Medrano   Patients step daughter reports patient is doing well. Eating and drinking good. No bladder or bowel problems. Continues on O2 without any breathing issues. She denies abdominal pain, sob, cp fever or cough. Elevates legs to keep swelling down. No questions or needs at this time. Will continue to follow. Care Transitions Follow Up Call    Needs to be reviewed by the provider   Additional needs identified to be addressed with provider: No  none             Method of communication with provider : none      Care Transition Nurse (CTN) contacted the family by telephone to follow up after admission on 2022. Verified name and  with family as identifiers. Addressed changes since last contact: none  Discussed follow-up appointments. If no appointment was previously scheduled, appointment scheduling offered: na.   Is follow up appointment scheduled within 7 days of discharge? No.    Advance Care Planning:   Does patient have an Advance Directive:  unable to view . CTN reviewed discharge instructions, medical action plan and red flags with family and discussed any barriers to care and/or understanding of plan of care after discharge. Discussed appropriate site of care based on symptoms and resources available to patient including: PCP  Specialist  Urgent care clinics  When to call 911. The family agrees to contact the PCP office for questions related to their healthcare.      Patients top risk factors for readmission: ineffective coping  level of motivation  medical condition-CKD3, DM, COPD,  Anemia, CHF, Htn  Interventions to address risk factors: Education of patient/family/caregiver/guardian to support self-management-       Non-[facility] follow up appointment(s):     CTN provided contact information for future needs. Plan for follow-up call in 5-7 days based on severity of symptoms and risk factors. Plan for next call: symptom management-   self management-   follow up appointment-           Care Transitions Subsequent and Final Call    Subsequent and Final Calls  Do you have any ongoing symptoms?: No  Have your medications changed?: No  Do you have any questions related to your medications?: No  Do you currently have any active services?: Yes  Are you currently active with any services?: Home Health  Do you have any needs or concerns that I can assist you with?: No  Identified Barriers: None  Care Transitions Interventions   Home Care Waiver: Completed Physical Therapy: Completed     Occupational Therapy: Completed     Other Interventions:              Follow Up  Future Appointments   Date Time Provider Serena Treviño   1/12/2023 10:00 AM EVONNE Barnes - BRANDON Jones [facility]   1/24/2023  2:00 PM Marichuy Lance MD [address]       Greg Cobian LPN

## 2022-12-29 ENCOUNTER — TELEPHONE (OUTPATIENT)
Dept: INTERNAL MEDICINE CLINIC | Age: 87
End: 2022-12-29

## 2022-12-29 NOTE — TELEPHONE ENCOUNTER
Pt has a open wound on his left leg and has clear drainage     They would like an verbal okay from the Doctor to dress the wound

## 2023-01-02 DIAGNOSIS — R60.0 LOCALIZED EDEMA: Chronic | ICD-10-CM

## 2023-01-03 ENCOUNTER — CARE COORDINATION (OUTPATIENT)
Dept: CASE MANAGEMENT | Age: 88
End: 2023-01-03

## 2023-01-03 RX ORDER — TORSEMIDE 100 MG/1
TABLET ORAL
Qty: 135 TABLET | Refills: 1 | Status: SHIPPED | OUTPATIENT
Start: 2023-01-03

## 2023-01-03 NOTE — CARE COORDINATION
[facility] Care Transitions Follow Up Call    Care Transition Nurse contacted the family by telephone to perform post hospital discharge assessment. Verified name with family as identifiers. Provided introduction to self, and explanation of the Care Transition Nurse role. Patient: Karan Winston Patient :  5/3/1934  MRN: [id number]  Reason for Admission:  hypoxia  Discharge Date:    RARS: 15    Challenges to be reviewed by the provider   Additional needs identified to be addressed with provider:     no    AMB CC Provider Discharge Needs: none       Method of communication with provider : none      SUMMARY  CTN spoke to the Pt's daughter, Rachel Parikh, who reports the following:    -Reports swelling to LE and pt is elevating. Torsemide refilled. Dtr reports she went to pharmacy to pickup but med not ready yet so she is waiting for call and will pickup when ready. Pt will continue to elevate feet as needed and follow fluid and sodium restriction.  -Reports skin tear to LE. Dtr attempting to place carlota hose and pt \"skin came off\". [facility] who received approval to dress wound.    -Denies fever, chills, nausea, vomiting, cough, congestion, SOB / DB, and chest pain. -Denies issues with fluid intake (advised to follow fluid restriction recommended by physician d/t hx of HF), appetite, urination, and bowel habits.    -[facility] continues to follow and seeing pt twice a week. From CDC: Are you at higher risk for severe illness? Wash your hands often. Avoid close contact (6 feet, which is about two arm lengths) w/people who are sick. Put distance between yourself and other people if COVID-19 is spreading in your community. Clean and disinfect frequently touched surfaces. Avoid all cruise travel and non-essential air travel. Call your healthcare professional if you have concerns about COVID-19 and your underlying condition or if you are sick.     Pt will take all meds as prescribed and schedule / keep doctor's appt. CTC provided education on s/s that require medical attention and when to seek medical attention. CTC advised Pt of use urgent care or physician's 24 hr access line if assistance is needed after hours or on the weekend. Family denies any needs or concerns and is agreeable with additional calls. Addressed changes since last contact:  home health care-[facility]  medications-no changes    Discussed follow-up appointments. If no appointment was previously scheduled, appointment scheduling offered: Yes     Is follow up appointment scheduled within 7 days of discharge? Yes - PCP     Care Transitions 24 Hour Call    Do you have any ongoing symptoms?: Yes  Patient-reported symptoms: Other (Comment: LE edema - skin tear to left LE)  Interventions for patient-reported symptoms: Other  Do you have all of your prescriptions and are they filled?: Yes (Comment: Reviewed new Rx for changed meds-Torsemide,Spironalactone, and Allopurinol.)  Were you discharged with any Home Care or Post Acute Services: Yes  Post Acute Services: Home Health (Comment: [place])  Patient DME: Lynnda Leventhal, Wheelchair, [address] [facility] [address] chair, Other  Other Patient DME: Grab bars, raised toilet seat. Do you have support at home?: Alone  Are you an active caregiver in your home?: No  Care Transitions Interventions   Home Care Waiver: Completed Physical Therapy: Completed     Occupational Therapy: Completed              Non-[facility] follow up appointment(s):    Care Transition Nurse reviewed discharge instructions, medical action plan and red flags with family and discussed any barriers to care and/or understanding of plan of care after discharge.     Discussed appropriate site of care based on symptoms and resources available to patient including:    Specialist  After hours contact number  Benefits related nurse triage line  Urgent care clinics  [facility] health  When to call [facility] for reactivation or family member permission [phone]. The patient agrees to contact the PCP office for questions related to their healthcare. Patients top risk factors for readmission:   caregiver stress  functional cognitive ability  functional physical ability  falls  lack of knowledge about disease  medical condition  medication management    Interventions to address risk factors:   Education of patient/family/caregiver/guardian to support self-management  Assessment and support for treatment adherence and medication management     Care Transitions Subsequent and Final Call    Subsequent and Final Calls  Do you have any ongoing symptoms?: Yes  Patient-reported symptoms: Other  Interventions for patient-reported symptoms: Other  Have your medications changed?: Yes  Patient Reports: torsemide ordered  Do you have any questions related to your medications?: No  Do you currently have any active services?: Yes  Are you currently active with any services?: Home Health  Do you have any needs or concerns that I can assist you with?: No  Care Transitions Interventions   Home Care Waiver: Completed Physical Therapy: Completed     Occupational Therapy: Completed     Other Interventions:              Care Transition Nurse provided contact information for future needs. Plan for f/u call in 7-10 days based on severity of symptoms and risk factors. Plan for next call:   symptom management  self-management  follow up appointment  medication management       Future Appointments   Date Time Provider Serena Treviño   1/12/2023 10:00 AM EVONNE Art - CNP [place] [facility]   1/24/2023  2:00 PM Darryle Pancake, MD [facility] [address]  SHONA MckeonN, RN  Care Transition Coordinator  Contact Number:  [phone]

## 2023-01-06 ENCOUNTER — TELEPHONE (OUTPATIENT)
Dept: CARDIOLOGY CLINIC | Age: 88
End: 2023-01-06

## 2023-01-06 PROBLEM — R77.8 ELEVATED TROPONIN: Status: RESOLVED | Noted: 2022-12-07 | Resolved: 2023-01-06

## 2023-01-06 PROBLEM — R79.89 ELEVATED TROPONIN: Status: RESOLVED | Noted: 2022-12-07 | Resolved: 2023-01-06

## 2023-01-06 NOTE — TELEPHONE ENCOUNTER
Spoke with pt son. Informed him that pt needs to reschedule due to not having completed Monitor. Pt son states he will have sister call and and reschedule. Currently pt in feeling fine.

## 2023-01-10 ENCOUNTER — NURSE ONLY (OUTPATIENT)
Dept: CARDIOLOGY CLINIC | Age: 88
End: 2023-01-10

## 2023-01-10 DIAGNOSIS — R00.2 PALPITATION: Primary | ICD-10-CM

## 2023-01-10 PROCEDURE — 93246 EXT ECG>7D<15D RECORDING: CPT | Performed by: INTERNAL MEDICINE

## 2023-01-10 NOTE — PROGRESS NOTES
Pt wanted to know Why monitor was not placed in the hospital. 14 day monitor placed today. [employer] cam number is [id number].

## 2023-01-24 ENCOUNTER — OFFICE VISIT (OUTPATIENT)
Dept: INTERNAL MEDICINE CLINIC | Age: 88
End: 2023-01-24
Payer: MEDICARE

## 2023-01-24 VITALS
HEIGHT: 66 IN | SYSTOLIC BLOOD PRESSURE: 134 MMHG | BODY MASS INDEX: 42.11 KG/M2 | WEIGHT: 262 LBS | DIASTOLIC BLOOD PRESSURE: 80 MMHG | HEART RATE: 63 BPM | OXYGEN SATURATION: 93 % | TEMPERATURE: 97.4 F

## 2023-01-24 DIAGNOSIS — N18.31 STAGE 3A CHRONIC KIDNEY DISEASE (HCC): ICD-10-CM

## 2023-01-24 DIAGNOSIS — I50.32 CHRONIC HEART FAILURE WITH PRESERVED EJECTION FRACTION (HFPEF) (HCC): ICD-10-CM

## 2023-01-24 DIAGNOSIS — J43.9 PULMONARY EMPHYSEMA, UNSPECIFIED EMPHYSEMA TYPE (HCC): Chronic | ICD-10-CM

## 2023-01-24 DIAGNOSIS — Z12.5 PROSTATE CANCER SCREENING: ICD-10-CM

## 2023-01-24 DIAGNOSIS — E66.9 DIABETES MELLITUS TYPE 2 IN OBESE (HCC): ICD-10-CM

## 2023-01-24 DIAGNOSIS — I27.20 MODERATE TO SEVERE PULMONARY HYPERTENSION (HCC): ICD-10-CM

## 2023-01-24 DIAGNOSIS — R00.1 JUNCTIONAL BRADYCARDIA: ICD-10-CM

## 2023-01-24 DIAGNOSIS — E11.69 DIABETES MELLITUS TYPE 2 IN OBESE (HCC): ICD-10-CM

## 2023-01-24 DIAGNOSIS — M79.89 LEFT LEG SWELLING: Primary | ICD-10-CM

## 2023-01-24 DIAGNOSIS — J96.11 CHRONIC HYPOXEMIC RESPIRATORY FAILURE (HCC): ICD-10-CM

## 2023-01-24 DIAGNOSIS — E03.9 HYPOTHYROIDISM, UNSPECIFIED TYPE: ICD-10-CM

## 2023-01-24 DIAGNOSIS — M1A.9XX0 CHRONIC GOUT WITHOUT TOPHUS, UNSPECIFIED CAUSE, UNSPECIFIED SITE: Chronic | ICD-10-CM

## 2023-01-24 PROBLEM — R78.81 GRAM-NEGATIVE BACTEREMIA: Status: RESOLVED | Noted: 2022-12-05 | Resolved: 2023-01-24

## 2023-01-24 PROBLEM — N39.0 COMPLICATED UTI (URINARY TRACT INFECTION): Status: RESOLVED | Noted: 2022-12-05 | Resolved: 2023-01-24

## 2023-01-24 PROBLEM — E87.70 HYPERVOLEMIA: Status: RESOLVED | Noted: 2022-12-02 | Resolved: 2023-01-24

## 2023-01-24 PROBLEM — R78.81 ENTEROCOCCAL BACTEREMIA: Status: RESOLVED | Noted: 2022-12-04 | Resolved: 2023-01-24

## 2023-01-24 PROBLEM — J96.01 ACUTE RESPIRATORY FAILURE WITH HYPOXIA AND HYPERCAPNIA (HCC): Status: RESOLVED | Noted: 2022-12-02 | Resolved: 2023-01-24

## 2023-01-24 PROBLEM — J18.9 PNEUMONIA DUE TO INFECTIOUS ORGANISM: Status: RESOLVED | Noted: 2022-12-02 | Resolved: 2023-01-24

## 2023-01-24 PROBLEM — B95.2 ENTEROCOCCAL BACTEREMIA: Status: RESOLVED | Noted: 2022-12-04 | Resolved: 2023-01-24

## 2023-01-24 PROBLEM — R55 SYNCOPE AND COLLAPSE: Status: RESOLVED | Noted: 2022-12-03 | Resolved: 2023-01-24

## 2023-01-24 PROBLEM — E83.52 HYPERCALCEMIA: Chronic | Status: RESOLVED | Noted: 2017-08-24 | Resolved: 2023-01-24

## 2023-01-24 PROBLEM — J96.02 ACUTE RESPIRATORY FAILURE WITH HYPOXIA AND HYPERCAPNIA (HCC): Status: RESOLVED | Noted: 2022-12-02 | Resolved: 2023-01-24

## 2023-01-24 PROCEDURE — 1123F ACP DISCUSS/DSCN MKR DOCD: CPT | Performed by: INTERNAL MEDICINE

## 2023-01-24 PROCEDURE — 99215 OFFICE O/P EST HI 40 MIN: CPT | Performed by: INTERNAL MEDICINE

## 2023-01-24 SDOH — ECONOMIC STABILITY: FOOD INSECURITY: WITHIN THE PAST 12 MONTHS, THE FOOD YOU BOUGHT JUST DIDN'T LAST AND YOU DIDN'T HAVE MONEY TO GET MORE.: NEVER TRUE

## 2023-01-24 SDOH — ECONOMIC STABILITY: FOOD INSECURITY: WITHIN THE PAST 12 MONTHS, YOU WORRIED THAT YOUR FOOD WOULD RUN OUT BEFORE YOU GOT MONEY TO BUY MORE.: NEVER TRUE

## 2023-01-24 ASSESSMENT — PATIENT HEALTH QUESTIONNAIRE - PHQ9
2. FEELING DOWN, DEPRESSED OR HOPELESS: 0
SUM OF ALL RESPONSES TO PHQ QUESTIONS 1-9: 0
1. LITTLE INTEREST OR PLEASURE IN DOING THINGS: 0
SUM OF ALL RESPONSES TO PHQ QUESTIONS 1-9: 0
SUM OF ALL RESPONSES TO PHQ9 QUESTIONS 1 & 2: 0
SUM OF ALL RESPONSES TO PHQ QUESTIONS 1-9: 0
SUM OF ALL RESPONSES TO PHQ QUESTIONS 1-9: 0

## 2023-01-24 ASSESSMENT — SOCIAL DETERMINANTS OF HEALTH (SDOH): HOW HARD IS IT FOR YOU TO PAY FOR THE VERY BASICS LIKE FOOD, HOUSING, MEDICAL CARE, AND HEATING?: NOT HARD AT ALL

## 2023-01-24 ASSESSMENT — ENCOUNTER SYMPTOMS: SHORTNESS OF BREATH: 0

## 2023-01-24 NOTE — ASSESSMENT & PLAN NOTE
Carries this diagnosis but no emphysematous changes seen on last CT chest done in 2016 and again during recent admission. He did have PFTs that here which were unreliable for conclusion due to inadequate performance. Now on chronic o2 since admission. Will need repeat PFTs after recovering for recent illness.

## 2023-01-24 NOTE — ASSESSMENT & PLAN NOTE
-With recent admission for decompensated heart failure requiring IV diuresis  -Echo during admission 12/22- Mild LVH, EF 14-87%, grade 1 diastolic dysfunction, systolic septal flattening consistent with right ventricular pressure, RA and RV dilated, moderate to severe TR, elevated pulmonary artery pressure 90 mmHg  -Beta-blocker stopped due to junctional rhythm during admission  -Continue torsemide

## 2023-01-24 NOTE — ASSESSMENT & PLAN NOTE
-We started low-dose Synthroid 25 mcg for elevated TSH>8 a few months ago, due for repeat testing  -Continue Synthroid 25 mcg for now

## 2023-01-24 NOTE — ASSESSMENT & PLAN NOTE
-seen on echo last admission, ? Cardiac versus ? pulmonary. Will need repeat PFTs and echo after recovering from recent illness to establish baseline and etiology. Will also need to establish with cardiology.   For now continue with torsemide

## 2023-01-24 NOTE — ASSESSMENT & PLAN NOTE
-had been on supplemental oxygen since recent admission 12/22 with suspected pneumonia.   -Still uses 2.5 L O2 at home with rest and exertion, saturation without oxygen in the office today 93%  -Continue O2 supplements for now to maintain saturation>92%

## 2023-01-24 NOTE — PROGRESS NOTES
[facility] Internal Medicine  Follow up visit   2023    Berto Lujan (:  5/3/1934) is a 80 y.o. male, here for evaluation of the following medical concerns:    Chief Complaint   Patient presents with    Hypertension    Hyperlipidemia        ASSESSMENT/ PLAN:    Chronic hypoxemic respiratory failure ([facility]  -had been on supplemental oxygen since recent admission  with suspected pneumonia. -Still uses 2.5 L O2 at home with rest and exertion, saturation without oxygen in the office today 93%  -Continue O2 supplements for now to maintain saturation>92%    Chronic heart failure with preserved ejection fraction (HFpEF) ([facility]  -With recent admission for decompensated heart failure requiring IV diuresis  -Echo during admission - Mild LVH, EF 51-39%, grade 1 diastolic dysfunction, systolic septal flattening consistent with right ventricular pressure, RA and RV dilated, moderate to severe TR, elevated pulmonary artery pressure 90 mmHg  -Beta-blocker stopped due to junctional rhythm during admission  -Continue torsemide    Chronic kidney disease, stage III (moderate)  -baseline GFR 40-50s. -He is status post nephrectomy, unclear why  -established with dr Camilo Berg  -microalbuminuria 258 , repeat    Diabetes mellitus type 2 in obese ([facility]  A1c7.2% , Known CKD stage III. -Currently not on treatment and is managed with lifestyle. Was on Lantus, semaglutide and Januvia before, per patient and family stopped by prior PCP.    - Repeat HbA1c. AIC goal< 7    - BP within goal.  Not on ACE/ARB  - LDL within goal, continue simvastatin 20 mg   - Continue ASA   - low carb diet and regular exercise  - regular foot care  - repeat BMP, microalbuminuria 258   - uptodate w/ PSV23 vaccine. - Follow up in 3 months    Pulmonary emphysema ([facility]  Carries this diagnosis but no emphysematous changes seen on last CT chest done in 2016 and again during recent admission.   He did have PFTs that here which were unreliable for conclusion due to inadequate performance. Now on chronic o2 since admission. Will need repeat PFTs after recovering for recent illness. Moderate to severe pulmonary hypertension ([address]  -seen on echo last admission, ? Cardiac versus ? pulmonary. Will need repeat PFTs and echo after recovering from recent illness to establish baseline and etiology. Will also need to establish with cardiology. For now continue with torsemide    Hypothyroidism  -We started low-dose Synthroid 25 mcg for elevated TSH>8 a few months ago, due for repeat testing  -Continue Synthroid 25 mcg for now    Left leg swelling  - Noted left leg swelling and slight erythema, per family since recent admission  -Stat ultrasound Doppler to rule out DVT  -Appearance today less suggestive of infection, recently treated with broad antibiotics for bacteremia and would prefer to hold on another course of empiric antibiotics at this point    Junctional bradycardia  - On telemetry during recent admission, discharged with heart monitoring which she returned yesterday, will need to establish with EP    Chronic gout without tophus  - Unclear if he had been taking allopurinol, this was not prescribed since last PCP 5/21, family will check and let me know, was on 200 mg. No recent flares  -Uric acid 5.812/21      Orders Placed This Encounter   Procedures    VL DUP LOWER EXTREMITY VENOUS LEFT    TSH with Reflex to FT4    Basic Metabolic Panel    Hemoglobin A1C    MICROALBUMIN / CREATININE URINE RATIO    PSA Screening     No orders of the defined types were placed in this encounter. There are no discontinued medications. No follow-ups on file. HPI  Admitted 12/22 due to HF exacerbation and bacteremia from likely urosepsis   Completed heart monitoring until yesterday  Still using o2 at home likely 2. 5?   Home sats mostly low 90s with oxygen  Reports left leg swelling since admission  Completed a week of antibiotic course at home     HISTORIES Current Outpatient Medications on File Prior to Visit   Medication Sig Dispense Refill    torsemide (DEMADEX) 100 MG tablet TAKE ONE TABLET BY MOUTH EVERY MORNING AND 1/2 TABLET IN THE LATE AFTERNOON 135 tablet 1    amLODIPine (NORVASC) 5 MG tablet Take 1 tablet by mouth daily 30 tablet 3    simvastatin (ZOCOR) 20 MG tablet TAKE ONE TABLET BY MOUTH ONCE NIGHTLY 90 tablet 1    levothyroxine (SYNTHROID) 25 MCG tablet Take 1 tablet by mouth daily 90 tablet 1    ACCU-CHEK MEAGAN PLUS strip CHECK BLOOD SUGARS THREE TIMES A DAY BEFORE A MEAL 100 strip 1    tamsulosin (FLOMAX) 0.4 MG capsule Take 1 capsule by mouth daily 90 capsule 3    Accu-Chek Softclix Lancets MISC Check 3 times day before meals 100 each 3    allopurinol (ZYLOPRIM) 100 MG tablet TAKE TWO TABLETS BY MOUTH DAILY 180 tablet 3     No current facility-administered medications on file prior to visit.        No Known Allergies  Past Medical History:   Diagnosis Date    Anemia 4/9/2012    Antineutrophil cytoplasmic antibody (ANCA) positive 8/21/2017    Atelectasis of left lung 2/18/2016    Bell's palsy 7/24/2015    Left sided - July 2015    Benign non-nodular prostatic hyperplasia with lower urinary tract symptoms 11/3/2015    BPH (benign prostatic hypertrophy) 7/5/2011    Chronic gout without tophus 11/3/2015    Chronic kidney disease, stage III (moderate) (HCC)     Chronic pain of both knees 11/8/2016    Coronary artery calcification seen on CT scan 2/18/2016    Diabetes mellitus, type 2 (HCC)     Diastolic dysfunction 5/6/8671    Dyspnea on exertion 2/7/2014    Edema 1/8/2013    Elevated PSA 5/7/2015    Essential hypertension 11/3/2015    Gastroesophageal reflux disease without esophagitis 11/3/2015    GERD (gastroesophageal reflux disease) 7/5/2011    Gout 9/22/2011    Hepatic cyst 2/18/2016    Hypercalcemia 8/24/2017    Hyperlipidemia     Hypertension     Insomnia 6/29/2010    Localized edema 9/20/2016    Morbid obesity due to excess calories ([facility] 11/8/2016    Multiple thyroid nodules 2/18/2016    Nocturia 5/7/2015    Obesity     Obstructive sleep apnea     Primary osteoarthritis of left knee 11/8/2016    Primary osteoarthritis of right knee 11/8/2016    Proteinuria 9/27/2010    Pulmonary emphysema ([address] 2/8/2017    Pulmonary nodules 2/4/2016    Shortness of breath 2/4/2016    Type 2 diabetes mellitus with diabetic chronic kidney disease ([address] 11/3/2015    Type 2 diabetes mellitus with stage 3 chronic kidney disease ([address] 2/4/2016    Type 2 diabetes mellitus with stage 3 chronic kidney disease, without long-term current use of insulin ([address] 8/5/2016    Vitamin D deficiency 8/21/2017     Patient Active Problem List   Diagnosis    Obstructive sleep apnea    Chronic kidney disease, stage III (moderate) (HCC)    Hyperlipidemia    Insomnia    Proteinuria    Anemia    Bilateral leg weakness    Osteoarthritis    Diastolic dysfunction    Bell's palsy    Decreased hearing    Chronic gout without tophus    Primary hypertension    Benign non-nodular prostatic hyperplasia with lower urinary tract symptoms    Gastroesophageal reflux disease without esophagitis    Pulmonary nodules    Hepatic cyst    CAD in native artery    Diabetes mellitus type 2 in obese (HCC)    Goiter, nontoxic, multinodular    Morbid obesity (HCC)    Pulmonary emphysema (HCC)    Venous stasis dermatitis of both lower extremities    Antineutrophil cytoplasmic antibody (ANCA) positive    Vitamin D deficiency    Chronic hypoxemic respiratory failure (HCC)    RVF (right ventricular failure) (HCC)    Junctional bradycardia    Chronic heart failure with preserved ejection fraction (HFpEF) (HCC)    Moderate tricuspid regurgitation    Moderate to severe pulmonary hypertension ([address]    Hypothyroidism    Left leg swelling     Past Surgical History:   Procedure Laterality Date    APPENDECTOMY      COLONOSCOPY      Dr. Oscar Cisneros COLONOSCOPY FLX DX W/COLLJ SPEC WHEN PFRMD N/A 2018    COLONOSCOPY WITH MAC performed by William Ross MD at [address] [facility] [address]      left sided - 2000    TURP      8571    UMBILICAL HERNIA REPAIR      2000    UPPER GASTROINTESTINAL ENDOSCOPY N/A 2018    EGD BIOPSY Forcep biopsy of gastric R/O H. 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    Occupation: retired - age 72     Comment: as of May 10, 2013   Tobacco Use    Smoking status: Former     Types: Cigarettes    Smokeless tobacco: Never    Tobacco comments:     quit smoking in    Substance and Sexual Activity    Alcohol use: No    Drug use: No    Sexual activity: Never     Comment:  -    Other Topics Concern    Not on file   Social History Narrative    Past Surgical History     Appendectomy    Hernia Surgery: umbilical hernia repair - 2000    TURP: 2000    Cervical Cyst Removed    left nephrectomy - 2000                                                     Last updated by Richie Krabbe MD on 10/16/2008              Social History       Marital Status:  ()    Children: 1     Employment Status: retired -     Occupation:     Caffeine per Day: one cup of coffee     Alcohol Use: none    Drug Use: none    Tobacco Usage: prior smoker    Cigarettes - Year Quit:                                                 Last updated by Mayda Santiago MD on 2014                      Family History     mother -  - age 55 - 56 - heart attack    father -  - age 67 -  in his sleep        brother -  - age 61 - 5 - head injury with need for a plate, seizures    brother - Yuli Rogel - abnormal weight loss        sister - Edgar Lind - throat cancer (smoker) (age 62)        son - Leonardo Montez - obesity                                 Last updated by Roxy Malone MD on 01/15/2009      Social Determinants of Health     Financial Resource Strain: Low Risk     Difficulty of Paying Living Expenses: Not hard at all   Food Insecurity: No Food Insecurity    Worried About Running Out of Food in the Last Year: Never true    Ran Out of Food in the Last Year: Never true   Transportation Needs: Not on file   Physical Activity: Not on file   Stress: Not on file   Social Connections: Not on file   Intimate Partner Violence: Not on file   Housing Stability: Not on file      Family History   Problem Relation Age of Onset    Cancer Sister         throat    Substance Abuse Sister         smoker    Heart Disease Mother     Heart Attack Mother     Stroke Brother     Seizures Brother        ROS  Review of Systems   Respiratory:  Negative for shortness of breath. Cardiovascular:  Positive for leg swelling. Negative for chest pain. PE  Vitals:    01/24/23 1409   BP: 134/80   Site: Left Upper Arm   Position: Sitting   Cuff Size: Large Adult   Pulse: 63   Temp: 97.4 °F (36.3 °C)   TempSrc: Temporal   SpO2: 93%   Weight: 262 lb (118.8 kg)   Height: 5' 6\" (1.676 m)     Estimated body mass index is 42.29 kg/m² as calculated from the following:    Height as of this encounter: 5' 6\" (1.676 m). Weight as of this encounter: 262 lb (118.8 kg). Physical Exam  Vitals reviewed. Constitutional:       General: He is not in acute distress. Appearance: Normal appearance. He is not ill-appearing, toxic-appearing or diaphoretic. HENT:      Head: Normocephalic and atraumatic. Eyes:      Conjunctiva/sclera: Conjunctivae normal.      Pupils: Pupils are equal, round, and reactive to light. Cardiovascular:      Rate and Rhythm: Normal rate and regular rhythm. Heart sounds: Murmur heard. Pulmonary:      Effort: Pulmonary effort is normal. No respiratory distress. Breath sounds: Normal breath sounds. Musculoskeletal:      Cervical back: Normal range of motion and neck supple. Right lower leg: Edema present. Left lower leg: Edema present. Comments: L>R   Skin:     General: Skin is warm and dry. Neurological:      Mental Status: He is alert and oriented to person, place, and time. Read MD Lauro    At least 45 mins spent reviewing patient chart, examining, evaluating, discussing patient's conditions, counseling and educating patient and family/caregivers and care coordination. This dictation was generated by voice recognition computer software. Although all attempts are made to edit the dictation for accuracy, there may be errors in the transcription that are not intended.

## 2023-01-24 NOTE — ASSESSMENT & PLAN NOTE
- Unclear if he had been taking allopurinol, this was not prescribed since last PCP 5/21, family will check and let me know, was on 200 mg.   No recent flares  -Uric acid 5.812/21

## 2023-01-24 NOTE — ASSESSMENT & PLAN NOTE
-baseline GFR 40-50s.    -He is status post nephrectomy, unclear why  -established with dr Charly Plunkett  -microalbuminuria 258 9/21, repeat

## 2023-01-24 NOTE — ASSESSMENT & PLAN NOTE
- Noted left leg swelling and slight erythema, per family since recent admission  -Stat ultrasound Doppler to rule out DVT  -Appearance today less suggestive of infection, recently treated with broad antibiotics for bacteremia and would prefer to hold on another course of empiric antibiotics at this point

## 2023-01-24 NOTE — ASSESSMENT & PLAN NOTE
- On telemetry during recent admission, discharged with heart monitoring which she returned yesterday, will need to establish with EP

## 2023-01-24 NOTE — ASSESSMENT & PLAN NOTE
A1c7.2% 7/22, Known CKD stage III. -Currently not on treatment and is managed with lifestyle. Was on Lantus, semaglutide and Januvia before, per patient and family stopped by prior PCP.    - Repeat HbA1c. AIC goal< 7    - BP within goal.  Not on ACE/ARB  - LDL within goal, continue simvastatin 20 mg   - Continue ASA   - low carb diet and regular exercise  - regular foot care  - repeat BMP, microalbuminuria 258 9/21  - uptodate w/ PSV23 vaccine.   - Follow up in 3 months

## 2023-01-25 DIAGNOSIS — E03.9 HYPOTHYROIDISM, UNSPECIFIED TYPE: Primary | ICD-10-CM

## 2023-01-25 LAB
ANION GAP SERPL CALCULATED.3IONS-SCNC: 9 MMOL/L (ref 3–16)
BUN BLDV-MCNC: 20 MG/DL (ref 7–20)
CALCIUM SERPL-MCNC: 11 MG/DL (ref 8.3–10.6)
CHLORIDE BLD-SCNC: 99 MMOL/L (ref 99–110)
CO2: 33 MMOL/L (ref 21–32)
CREAT SERPL-MCNC: 1.2 MG/DL (ref 0.8–1.3)
ESTIMATED AVERAGE GLUCOSE: 139.9 MG/DL
GFR SERPL CREATININE-BSD FRML MDRD: 58 ML/MIN/{1.73_M2}
GLUCOSE BLD-MCNC: 126 MG/DL (ref 70–99)
HBA1C MFR BLD: 6.5 %
POTASSIUM SERPL-SCNC: 4.3 MMOL/L (ref 3.5–5.1)
PROSTATE SPECIFIC ANTIGEN: 10.07 NG/ML (ref 0–4)
SODIUM BLD-SCNC: 141 MMOL/L (ref 136–145)
T4 FREE: 0.9 NG/DL (ref 0.9–1.8)
TSH REFLEX FT4: 5.8 UIU/ML (ref 0.27–4.2)

## 2023-01-25 RX ORDER — LEVOTHYROXINE SODIUM 0.05 MG/1
50 TABLET ORAL DAILY
Qty: 90 TABLET | Refills: 1 | Status: SHIPPED | OUTPATIENT
Start: 2023-01-25

## 2023-01-26 DIAGNOSIS — E83.52 SERUM CALCIUM ELEVATED: Primary | ICD-10-CM

## 2023-01-26 DIAGNOSIS — E83.52 SERUM CALCIUM ELEVATED: ICD-10-CM

## 2023-01-26 LAB — ALBUMIN SERPL-MCNC: 4.5 G/DL (ref 3.4–5)

## 2023-02-02 ENCOUNTER — TELEPHONE (OUTPATIENT)
Dept: CARDIOLOGY CLINIC | Age: 88
End: 2023-02-02

## 2023-02-02 PROCEDURE — 93248 EXT ECG>7D<15D REV&INTERPJ: CPT | Performed by: INTERNAL MEDICINE

## 2023-02-02 NOTE — TELEPHONE ENCOUNTER
CAM results:  Called 1st number for DEPARTMENT [facility] and was the wrong number,     Left  message with Haresh to call back with results of monitor,   Called 2nd number for DEPARTMENT [facility] and was able to speak to her with results for monitor, no need to follow up unless  Symptomatic. [facility] verbalized understanding.

## 2023-02-06 DIAGNOSIS — R00.2 PALPITATION: Primary | ICD-10-CM

## 2023-02-21 DIAGNOSIS — N40.1 BENIGN NON-NODULAR PROSTATIC HYPERPLASIA WITH LOWER URINARY TRACT SYMPTOMS: Chronic | ICD-10-CM

## 2023-02-21 DIAGNOSIS — Z79.4 TYPE 2 DIABETES MELLITUS WITH STAGE 3A CHRONIC KIDNEY DISEASE, WITH LONG-TERM CURRENT USE OF INSULIN (HCC): ICD-10-CM

## 2023-02-21 DIAGNOSIS — E11.22 TYPE 2 DIABETES MELLITUS WITH STAGE 3A CHRONIC KIDNEY DISEASE, WITH LONG-TERM CURRENT USE OF INSULIN (HCC): ICD-10-CM

## 2023-02-21 DIAGNOSIS — N18.31 TYPE 2 DIABETES MELLITUS WITH STAGE 3A CHRONIC KIDNEY DISEASE, WITH LONG-TERM CURRENT USE OF INSULIN (HCC): ICD-10-CM

## 2023-02-21 RX ORDER — BLOOD SUGAR DIAGNOSTIC
STRIP MISCELLANEOUS
Qty: 100 STRIP | Refills: 1 | Status: SHIPPED | OUTPATIENT
Start: 2023-02-21

## 2023-02-21 RX ORDER — TAMSULOSIN HYDROCHLORIDE 0.4 MG/1
CAPSULE ORAL
Qty: 90 CAPSULE | Refills: 3 | Status: SHIPPED | OUTPATIENT
Start: 2023-02-21

## 2023-02-21 RX ORDER — FAMOTIDINE 20 MG/1
TABLET, FILM COATED ORAL
Qty: 90 TABLET | Refills: 3 | OUTPATIENT
Start: 2023-02-21

## 2023-03-13 RX ORDER — FAMOTIDINE 20 MG/1
TABLET, FILM COATED ORAL
Qty: 90 TABLET | Refills: 0 | OUTPATIENT
Start: 2023-03-13

## 2023-04-03 RX ORDER — AMLODIPINE BESYLATE 5 MG/1
TABLET ORAL
Qty: 30 TABLET | Refills: 3 | OUTPATIENT
Start: 2023-04-03

## 2023-05-01 ENCOUNTER — OFFICE VISIT (OUTPATIENT)
Dept: INTERNAL MEDICINE CLINIC | Age: 88
End: 2023-05-01
Payer: MEDICARE

## 2023-05-01 VITALS
HEIGHT: 66 IN | BODY MASS INDEX: 44.84 KG/M2 | DIASTOLIC BLOOD PRESSURE: 80 MMHG | TEMPERATURE: 97.1 F | SYSTOLIC BLOOD PRESSURE: 138 MMHG | WEIGHT: 279 LBS

## 2023-05-01 DIAGNOSIS — I25.10 CAD IN NATIVE ARTERY: ICD-10-CM

## 2023-05-01 DIAGNOSIS — E03.9 HYPOTHYROIDISM, UNSPECIFIED TYPE: ICD-10-CM

## 2023-05-01 DIAGNOSIS — M1A.9XX0 CHRONIC GOUT WITHOUT TOPHUS, UNSPECIFIED CAUSE, UNSPECIFIED SITE: Chronic | ICD-10-CM

## 2023-05-01 DIAGNOSIS — R00.1 JUNCTIONAL BRADYCARDIA: ICD-10-CM

## 2023-05-01 DIAGNOSIS — I10 PRIMARY HYPERTENSION: ICD-10-CM

## 2023-05-01 DIAGNOSIS — N40.1 BENIGN NON-NODULAR PROSTATIC HYPERPLASIA WITH LOWER URINARY TRACT SYMPTOMS: Chronic | ICD-10-CM

## 2023-05-01 DIAGNOSIS — I50.32 CHRONIC HEART FAILURE WITH PRESERVED EJECTION FRACTION (HFPEF) (HCC): ICD-10-CM

## 2023-05-01 DIAGNOSIS — I50.32 CHRONIC HEART FAILURE WITH PRESERVED EJECTION FRACTION (HFPEF) (HCC): Primary | ICD-10-CM

## 2023-05-01 DIAGNOSIS — J96.11 CHRONIC HYPOXEMIC RESPIRATORY FAILURE (HCC): ICD-10-CM

## 2023-05-01 PROCEDURE — 99214 OFFICE O/P EST MOD 30 MIN: CPT | Performed by: INTERNAL MEDICINE

## 2023-05-01 PROCEDURE — 1123F ACP DISCUSS/DSCN MKR DOCD: CPT | Performed by: INTERNAL MEDICINE

## 2023-05-01 RX ORDER — TORSEMIDE 100 MG/1
100 TABLET ORAL
COMMUNITY

## 2023-05-01 RX ORDER — AMLODIPINE BESYLATE 5 MG/1
5 TABLET ORAL DAILY
Qty: 90 TABLET | Refills: 1 | Status: SHIPPED | OUTPATIENT
Start: 2023-05-01

## 2023-05-01 SDOH — ECONOMIC STABILITY: FOOD INSECURITY: WITHIN THE PAST 12 MONTHS, THE FOOD YOU BOUGHT JUST DIDN'T LAST AND YOU DIDN'T HAVE MONEY TO GET MORE.: NEVER TRUE

## 2023-05-01 SDOH — ECONOMIC STABILITY: HOUSING INSECURITY
IN THE LAST 12 MONTHS, WAS THERE A TIME WHEN YOU DID NOT HAVE A STEADY PLACE TO SLEEP OR SLEPT IN A SHELTER (INCLUDING NOW)?: NO

## 2023-05-01 SDOH — ECONOMIC STABILITY: FOOD INSECURITY: WITHIN THE PAST 12 MONTHS, YOU WORRIED THAT YOUR FOOD WOULD RUN OUT BEFORE YOU GOT MONEY TO BUY MORE.: NEVER TRUE

## 2023-05-01 SDOH — ECONOMIC STABILITY: INCOME INSECURITY: HOW HARD IS IT FOR YOU TO PAY FOR THE VERY BASICS LIKE FOOD, HOUSING, MEDICAL CARE, AND HEATING?: NOT HARD AT ALL

## 2023-05-01 ASSESSMENT — PATIENT HEALTH QUESTIONNAIRE - PHQ9
SUM OF ALL RESPONSES TO PHQ QUESTIONS 1-9: 0
2. FEELING DOWN, DEPRESSED OR HOPELESS: 0
SUM OF ALL RESPONSES TO PHQ QUESTIONS 1-9: 0
SUM OF ALL RESPONSES TO PHQ9 QUESTIONS 1 & 2: 0
1. LITTLE INTEREST OR PLEASURE IN DOING THINGS: 0
SUM OF ALL RESPONSES TO PHQ QUESTIONS 1-9: 0
SUM OF ALL RESPONSES TO PHQ QUESTIONS 1-9: 0

## 2023-05-01 ASSESSMENT — ENCOUNTER SYMPTOMS: SHORTNESS OF BREATH: 0

## 2023-05-01 NOTE — ASSESSMENT & PLAN NOTE
-We started low-dose Synthroid 25 mcg for elevated TSH>8 a few months ago, increased dose to 50 01/23  -due for repeat TSH now  -Continue Synthroid 50 mcg for now

## 2023-05-01 NOTE — ASSESSMENT & PLAN NOTE
Last PSA 10 from 4 before.  Used to see urology, explained he needs to go back for eval, might need biopsy

## 2023-05-01 NOTE — PROGRESS NOTES
[facility] Internal Medicine  Follow up visit   2023    Yissel Negrete (:  5/3/1934) is a 80 y.o. male, here for evaluation of the following medical concerns:    Chief Complaint   Patient presents with    Diabetes     3 month follow-up     Hypertension    Hyperlipidemia               ASSESSMENT/ PLAN:    Hypothyroidism  -We started low-dose Synthroid 25 mcg for elevated TSH>8 a few months ago, increased dose to 50   -due for repeat TSH  -Continue Synthroid 50 mcg for now    Benign non-nodular prostatic hyperplasia with lower urinary tract symptoms  Last PSA 10 from 4 before. Used to see urology, explained he needs to go back for eval, might need biopsy    Primary hypertension  -stoped amlodipine 2 weeks ago after her ran out, resume amlodipine 5 mg now    Chronic heart failure with preserved ejection fraction (HFpEF) ([facility] [address]  -With recent admission for decompensated heart failure requiring IV diuresis  -Echo during admission - Mild LVH, EF 60-93%, grade 1 diastolic dysfunction, systolic septal flattening consistent with right ventricular pressure, RA and RV dilated, moderate to severe TR, elevated pulmonary artery pressure 90 mmHg  -Beta-blocker stopped due to junctional rhythm during admission  -Continue torsemide  -needs to establish with cards, will refer to dr. Raheem Oseguera     Chronic hypoxemic respiratory failure ([facility] [address]  -had been on supplemental oxygen since admission , saturation in the office today 85-92% without oxygen. Still uses 2.5 L O2 at home with rest and exertion.  -Continue O2 supplements for now to maintain saturation>92%, I think he will benefit from portable O2, will send request.     Chronic gout without tophus  -had likely been off allopurinol for >1y (last prescription by prior PCP 2021). No flareups. Reports 1 flare up only before. Does  Have CKD.     -will not resume allopurinol yet, repeat uric acid       Orders Placed This Encounter   Procedures    TSH with Reflex to FT4

## 2023-05-02 LAB
ALBUMIN SERPL-MCNC: 4.3 G/DL (ref 3.4–5)
ALBUMIN/GLOB SERPL: 1.3 {RATIO} (ref 1.1–2.2)
ALP SERPL-CCNC: 81 U/L (ref 40–129)
ALT SERPL-CCNC: 9 U/L (ref 10–40)
ANION GAP SERPL CALCULATED.3IONS-SCNC: 10 MMOL/L (ref 3–16)
AST SERPL-CCNC: 14 U/L (ref 15–37)
BILIRUB SERPL-MCNC: 0.3 MG/DL (ref 0–1)
BUN SERPL-MCNC: 21 MG/DL (ref 7–20)
CALCIUM SERPL-MCNC: 10.3 MG/DL (ref 8.3–10.6)
CHLORIDE SERPL-SCNC: 102 MMOL/L (ref 99–110)
CO2 SERPL-SCNC: 32 MMOL/L (ref 21–32)
CREAT SERPL-MCNC: 1.5 MG/DL (ref 0.8–1.3)
GFR SERPLBLD CREATININE-BSD FMLA CKD-EPI: 44 ML/MIN/{1.73_M2}
GLUCOSE SERPL-MCNC: 122 MG/DL (ref 70–99)
POTASSIUM SERPL-SCNC: 3.8 MMOL/L (ref 3.5–5.1)
PROT SERPL-MCNC: 7.6 G/DL (ref 6.4–8.2)
SODIUM SERPL-SCNC: 144 MMOL/L (ref 136–145)
T4 FREE SERPL-MCNC: 1.7 NG/DL (ref 0.9–1.8)
TSH SERPL DL<=0.005 MIU/L-ACNC: 0.03 UIU/ML (ref 0.27–4.2)
URATE SERPL-MCNC: 10.5 MG/DL (ref 3.5–7.2)

## 2023-05-02 NOTE — ASSESSMENT & PLAN NOTE
-With recent admission for decompensated heart failure requiring IV diuresis  -Echo during admission 12/22- Mild LVH, EF 83-15%, grade 1 diastolic dysfunction, systolic septal flattening consistent with right ventricular pressure, RA and RV dilated, moderate to severe TR, elevated pulmonary artery pressure 90 mmHg  -Beta-blocker stopped due to junctional rhythm during admission  -Continue torsemide  -needs to establish with cards, will refer to dr. Ubaldo Mishra

## 2023-05-02 NOTE — ASSESSMENT & PLAN NOTE
-had likely been off allopurinol for >1y (last prescription by prior PCP 05/2021). No flareups. Reports 1 flare up only before. Does  Have CKD.     -will not resume allopurinol yet, repeat uric acid

## 2023-05-02 NOTE — ASSESSMENT & PLAN NOTE
-had been on supplemental oxygen since admission 12/22, saturation in the office today 85-92% without oxygen.  Still uses 2.5 L O2 at home with rest and exertion.  -Continue O2 supplements for now to maintain saturation>92%, I think he will benefit from portable O2, will send request.

## 2023-05-03 DIAGNOSIS — M1A.9XX0 CHRONIC GOUT WITHOUT TOPHUS, UNSPECIFIED CAUSE, UNSPECIFIED SITE: Chronic | ICD-10-CM

## 2023-05-03 RX ORDER — LEVOTHYROXINE SODIUM 0.03 MG/1
25 TABLET ORAL DAILY
Qty: 90 TABLET | Refills: 1 | Status: SHIPPED | OUTPATIENT
Start: 2023-05-03

## 2023-05-05 DIAGNOSIS — E03.9 HYPOTHYROIDISM, UNSPECIFIED TYPE: Primary | ICD-10-CM

## 2023-05-08 RX ORDER — ALLOPURINOL 100 MG/1
50 TABLET ORAL DAILY
Qty: 45 TABLET | Refills: 1 | Status: SHIPPED | OUTPATIENT
Start: 2023-05-08 | End: 2023-08-06

## 2023-05-31 RX ORDER — SIMVASTATIN 20 MG
20 TABLET ORAL NIGHTLY
Qty: 90 TABLET | Refills: 1 | Status: SHIPPED | OUTPATIENT
Start: 2023-05-31

## 2023-05-31 NOTE — PLAN OF CARE
Plan:     Symptom management  - Continue pt/ot/speech    Secondary prevention    Risk Factors   Elevated blood Pressure  BP: 111/75  - Goal <130/80  - Continue preventive therapy: Amlodipine and lisinopril as prescribed     Elevated cholesterol levels   LDL: 142  - Goal < 70 mg/dl  - Continue preventive therapy: Atorvastatin as prescribed     Diabetes   A1c: 6.2%  - Goal: < 7.0%  - Continue preventive therapy: Lifestyle modifications.     --- Plan to follow-up with your primary care provider to manage your vascular risk factors  --- Plan on follow up in the Neurology Clinic in 6 months.  --- Please feel free to reach out if you have any further questions or concerns.  --- Seek immediate medical attention if an emergency arises or if your health becomes progressively worse.     For any acute neurologic deficits she was advised to  go directly to the hospital rather than call the clinic.    It was a pleasure to meet you today!    Problem: Skin/Tissue Integrity  Goal: Absence of new skin breakdown  Description: 1. Monitor for areas of redness and/or skin breakdown  2. Assess vascular access sites hourly  3. Every 4-6 hours minimum:  Change oxygen saturation probe site  4. Every 4-6 hours:  If on nasal continuous positive airway pressure, respiratory therapy assess nares and determine need for appliance change or resting period. Outcome: Progressing  Note: Pt turned q2, incontinence care as needed, skin frequently assessed. Problem: Safety - Adult  Goal: Free from fall injury  Outcome: Progressing  Note: Fall precautions in place. Pt encouraged to use call light. Pt free from falls and injury. Problem: Chronic Conditions and Co-morbidities  Goal: Patient's chronic conditions and co-morbidity symptoms are monitored and maintained or improved  Outcome: Progressing  Flowsheets (Taken 12/6/2022 0333)  Care Plan - Patient's Chronic Conditions and Co-Morbidity Symptoms are Monitored and Maintained or Improved: Monitor and assess patient's chronic conditions and comorbid symptoms for stability, deterioration, or improvement  Note: Pt resp status stable. On 3L O2. Suctioned and encouraged to deep breathe.

## 2023-07-17 RX ORDER — TORSEMIDE 100 MG/1
100 TABLET ORAL
Qty: 90 TABLET | Refills: 1 | Status: SHIPPED | OUTPATIENT
Start: 2023-07-17

## 2023-08-22 ENCOUNTER — OFFICE VISIT (OUTPATIENT)
Dept: INTERNAL MEDICINE CLINIC | Age: 88
End: 2023-08-22
Payer: MEDICARE

## 2023-08-22 VITALS
HEIGHT: 66 IN | BODY MASS INDEX: 45.03 KG/M2 | HEART RATE: 89 BPM | OXYGEN SATURATION: 92 % | DIASTOLIC BLOOD PRESSURE: 68 MMHG | SYSTOLIC BLOOD PRESSURE: 132 MMHG

## 2023-08-22 DIAGNOSIS — E11.69 DIABETES MELLITUS TYPE 2 IN OBESE (HCC): Chronic | ICD-10-CM

## 2023-08-22 DIAGNOSIS — E66.9 DIABETES MELLITUS TYPE 2 IN OBESE (HCC): Chronic | ICD-10-CM

## 2023-08-22 DIAGNOSIS — E03.9 HYPOTHYROIDISM, UNSPECIFIED TYPE: ICD-10-CM

## 2023-08-22 DIAGNOSIS — N18.31 STAGE 3A CHRONIC KIDNEY DISEASE (HCC): Chronic | ICD-10-CM

## 2023-08-22 DIAGNOSIS — I50.32 CHRONIC HEART FAILURE WITH PRESERVED EJECTION FRACTION (HFPEF) (HCC): ICD-10-CM

## 2023-08-22 DIAGNOSIS — M1A.9XX0 CHRONIC GOUT WITHOUT TOPHUS, UNSPECIFIED CAUSE, UNSPECIFIED SITE: Chronic | ICD-10-CM

## 2023-08-22 DIAGNOSIS — Z00.00 MEDICARE ANNUAL WELLNESS VISIT, SUBSEQUENT: Primary | ICD-10-CM

## 2023-08-22 LAB
CHOLEST SERPL-MCNC: 216 MG/DL (ref 0–199)
CREAT UR-MCNC: 80.4 MG/DL (ref 39–259)
HDLC SERPL-MCNC: 44 MG/DL (ref 40–60)
LDLC SERPL CALC-MCNC: 131 MG/DL
MICROALBUMIN UR DL<=1MG/L-MCNC: 23.3 MG/DL
MICROALBUMIN/CREAT UR: 289.8 MG/G (ref 0–30)
TRIGL SERPL-MCNC: 207 MG/DL (ref 0–150)
TSH SERPL DL<=0.005 MIU/L-ACNC: 1.31 UIU/ML (ref 0.27–4.2)
VLDLC SERPL CALC-MCNC: 41 MG/DL

## 2023-08-22 PROCEDURE — 3044F HG A1C LEVEL LT 7.0%: CPT | Performed by: INTERNAL MEDICINE

## 2023-08-22 PROCEDURE — 99214 OFFICE O/P EST MOD 30 MIN: CPT | Performed by: INTERNAL MEDICINE

## 2023-08-22 PROCEDURE — G0439 PPPS, SUBSEQ VISIT: HCPCS | Performed by: INTERNAL MEDICINE

## 2023-08-22 PROCEDURE — 1123F ACP DISCUSS/DSCN MKR DOCD: CPT | Performed by: INTERNAL MEDICINE

## 2023-08-22 RX ORDER — ALLOPURINOL 100 MG/1
50 TABLET ORAL DAILY
Qty: 45 TABLET | Refills: 1 | Status: SHIPPED | OUTPATIENT
Start: 2023-08-22

## 2023-08-22 ASSESSMENT — PATIENT HEALTH QUESTIONNAIRE - PHQ9
2. FEELING DOWN, DEPRESSED OR HOPELESS: 0
SUM OF ALL RESPONSES TO PHQ QUESTIONS 1-9: 0
SUM OF ALL RESPONSES TO PHQ9 QUESTIONS 1 & 2: 0
1. LITTLE INTEREST OR PLEASURE IN DOING THINGS: 0

## 2023-08-22 ASSESSMENT — LIFESTYLE VARIABLES
HOW OFTEN DO YOU HAVE A DRINK CONTAINING ALCOHOL: NEVER
HOW MANY STANDARD DRINKS CONTAINING ALCOHOL DO YOU HAVE ON A TYPICAL DAY: PATIENT DOES NOT DRINK

## 2023-08-22 NOTE — ASSESSMENT & PLAN NOTE
Uncontrolled, we restarted allopurinol renally dosed 50 mg 05/23 due to uric acid 10 and history of CKD. -he ran out ~1 month ago and stopped, explained should never stop chronic medication even if runs out of refills (though he should still have 1 refill waiting in pharmacy), call us to let us know if you need a refill. Resume allopurinol 50 mg now.

## 2023-08-22 NOTE — ASSESSMENT & PLAN NOTE
-baseline GFR 40-50s.    -He is status post nephrectomy, unclear why  -established with dr Nara Gil  -microalbuminuria 258 9/21, repeat

## 2023-08-22 NOTE — PROGRESS NOTES
Medicare Annual Wellness Visit    Liliam Gamez is here for Medicare AWV    Assessment & Plan     Chronic heart failure with preserved ejection fraction (HFpEF) ([address])  stable  -Echo during admission 12/22- Mild LVH, EF 60-78%, grade 1 diastolic dysfunction, systolic septal flattening consistent with right ventricular pressure, RA and RV dilated, moderate to severe TR, elevated pulmonary artery pressure 90 mmHg  -Beta-blocker stopped due to junctional rhythm during admission  -Continue torsemide  -needs to establish with cards, refered to dr. Patricia Davis     Chronic kidney disease, stage III (moderate)  -baseline GFR 40-50s. -He is status post nephrectomy, unclear why  -established with dr Ester Garcia  -microalbuminuria 258 9/21, repeat    Diabetes mellitus type 2 in obese ([address])  A1c.5% 1/23, Known CKD stage III. -Currently not on treatment and is managed with lifestyle. Was on Lantus, semaglutide and Januvia before  - Repeat HbA1c. AIC goal< 7    - BP within goal.  Not on ACE/ARB  - LDL within goal, continue simvastatin 20 mg   - Continue ASA   - low carb diet and regular exercise  - regular foot care  - repeat BMP, microalbuminuria 258 9/21  - uptodate w/ PSV23 vaccine. - Follow up in 3 months    Hypothyroidism  Uncontrolled, last TSH indicates overtreatment and we lowered his dose to 25 mcg  -Repeat TSH now  -Continue 25 mcg for now, further dose adjustments per results    Chronic gout without tophus  Uncontrolled, we restarted allopurinol renally dosed 50 mg 05/23 due to uric acid 10 and history of CKD. -he ran out ~1 month ago and stopped, explained should never stop chronic medication even if runs out of refills (though he should still have 1 refill waiting in pharmacy), call us to let us know if you need a refill. Resume allopurinol 50 mg now.      Orders Placed This Encounter   Procedures    LIPID PANEL    MICROALBUMIN / CREATININE URINE RATIO    TSH with Reflex to FT4      Orders Placed This Encounter

## 2023-08-22 NOTE — ASSESSMENT & PLAN NOTE
Uncontrolled, last TSH indicates overtreatment and we lowered his dose to 25 mcg  -Repeat TSH now  -Continue 25 mcg for now, further dose adjustments per results

## 2023-08-22 NOTE — ASSESSMENT & PLAN NOTE
A1c.5% 1/23, Known CKD stage III. -Currently not on treatment and is managed with lifestyle. Was on Lantus, semaglutide and Januvia before  - Repeat HbA1c. AIC goal< 7    - BP within goal.  Not on ACE/ARB  - LDL within goal, continue simvastatin 20 mg   - Continue ASA   - low carb diet and regular exercise  - regular foot care  - repeat BMP, microalbuminuria 258 9/21  - uptodate w/ PSV23 vaccine.   - Follow up in 3 months

## 2023-08-22 NOTE — ASSESSMENT & PLAN NOTE
stable  -Echo during admission 12/22- Mild LVH, EF 72-62%, grade 1 diastolic dysfunction, systolic septal flattening consistent with right ventricular pressure, RA and RV dilated, moderate to severe TR, elevated pulmonary artery pressure 90 mmHg  -Beta-blocker stopped due to junctional rhythm during admission  -Continue torsemide  -needs to establish with cards, refered to dr. Margaret Silva

## 2023-08-29 ENCOUNTER — TELEPHONE (OUTPATIENT)
Dept: INTERNAL MEDICINE CLINIC | Age: 88
End: 2023-08-29

## 2023-08-29 DIAGNOSIS — E11.69 DIABETES MELLITUS TYPE 2 IN OBESE (HCC): Primary | Chronic | ICD-10-CM

## 2023-08-29 DIAGNOSIS — E66.9 DIABETES MELLITUS TYPE 2 IN OBESE (HCC): Primary | Chronic | ICD-10-CM

## 2023-08-29 DIAGNOSIS — I10 PRIMARY HYPERTENSION: ICD-10-CM

## 2023-08-29 DIAGNOSIS — E78.5 HYPERLIPIDEMIA, UNSPECIFIED HYPERLIPIDEMIA TYPE: Chronic | ICD-10-CM

## 2023-08-29 DIAGNOSIS — M1A.9XX0 CHRONIC GOUT WITHOUT TOPHUS, UNSPECIFIED CAUSE, UNSPECIFIED SITE: Chronic | ICD-10-CM

## 2023-08-29 RX ORDER — ROSUVASTATIN CALCIUM 10 MG/1
10 TABLET, COATED ORAL NIGHTLY
Qty: 90 TABLET | Refills: 1 | Status: SHIPPED | OUTPATIENT
Start: 2023-08-29

## 2023-08-29 NOTE — TELEPHONE ENCOUNTER
Results discussed with Salvador Centeno (daughter). Would like new script for Crestor sent to the pharmacy. New blood work orders placed.

## 2023-09-26 DIAGNOSIS — E11.22 TYPE 2 DIABETES MELLITUS WITH STAGE 3A CHRONIC KIDNEY DISEASE, WITH LONG-TERM CURRENT USE OF INSULIN (HCC): ICD-10-CM

## 2023-09-26 DIAGNOSIS — Z79.4 TYPE 2 DIABETES MELLITUS WITH STAGE 3A CHRONIC KIDNEY DISEASE, WITH LONG-TERM CURRENT USE OF INSULIN (HCC): ICD-10-CM

## 2023-09-26 DIAGNOSIS — N18.31 TYPE 2 DIABETES MELLITUS WITH STAGE 3A CHRONIC KIDNEY DISEASE, WITH LONG-TERM CURRENT USE OF INSULIN (HCC): ICD-10-CM

## 2023-09-26 RX ORDER — BLOOD SUGAR DIAGNOSTIC
STRIP MISCELLANEOUS
Qty: 100 STRIP | Refills: 3 | Status: SHIPPED | OUTPATIENT
Start: 2023-09-26

## 2023-09-26 NOTE — TELEPHONE ENCOUNTER
REFILL:    ACCU-CHEK MEAGAN PLUS strip    [facility] [id number] Roderick Mora, [address]

## 2023-10-25 RX ORDER — AMLODIPINE BESYLATE 5 MG/1
5 TABLET ORAL DAILY
Qty: 90 TABLET | Refills: 1 | Status: SHIPPED | OUTPATIENT
Start: 2023-10-25

## 2023-10-26 ENCOUNTER — TELEPHONE (OUTPATIENT)
Dept: INTERNAL MEDICINE CLINIC | Age: 88
End: 2023-10-26

## 2023-10-26 NOTE — TELEPHONE ENCOUNTER
----- Message from Marguerite Eula sent at 10/26/2023  2:56 PM EDT -----  Subject: Medication Problem    Medication: Other - Free Style Yana  Dosage: as directed  Ordering Provider: undefined    Question/Problem: Suki South from [facility] is looking for the Prio   Autorization paperwork to be signed and sent back regarding the [employer]. [phone]      Pharmacy: [address] [facility] ([address] [facility], [address] [facility] [phone] [place] Peers [phone]    ---------------------------------------------------------------------------  --------------  [facility] INFO  [phone]; OK to leave message on voicemail  ---------------------------------------------------------------------------  --------------    SCRIPT ANSWERS  Relationship to Patient: Covered Entity  Covered Entity Type: Pharmacy?   Representative Name: Suki South

## 2023-10-31 RX ORDER — LEVOTHYROXINE SODIUM 0.03 MG/1
25 TABLET ORAL DAILY
Qty: 90 TABLET | Refills: 1 | Status: SHIPPED | OUTPATIENT
Start: 2023-10-31

## 2023-10-31 NOTE — TELEPHONE ENCOUNTER
Medication:   Requested Prescriptions     Pending Prescriptions Disp Refills    levothyroxine (SYNTHROID) 25 MCG tablet [Pharmacy Med Name: LEVOTHYROXINE 25 MCG TABLET] 90 tablet 1     Sig: TAKE ONE TABLET BY MOUTH DAILY     Last Filled:  # 90 with 1 refill on 5/3/2023    Last appt: 8/22/2023   Next appt: 12/26/2023    Last OARRS:        No data to display                 8/22/23 1342 5/1/23 1346 1/24/23 1522 9/6/22 1435 9/2/21 1544     TSH Reflex FT4 0.27 - 4.20 uIU/mL 1.31  0.03 Low   5.80 High   5.36 High   3.34

## 2023-11-27 RX ORDER — SIMVASTATIN 20 MG
20 TABLET ORAL NIGHTLY
Qty: 90 TABLET | Refills: 1 | OUTPATIENT
Start: 2023-11-27

## 2023-12-26 ENCOUNTER — OFFICE VISIT (OUTPATIENT)
Dept: INTERNAL MEDICINE CLINIC | Age: 88
End: 2023-12-26
Payer: MEDICARE

## 2023-12-26 VITALS
DIASTOLIC BLOOD PRESSURE: 80 MMHG | BODY MASS INDEX: 42.75 KG/M2 | OXYGEN SATURATION: 91 % | HEIGHT: 66 IN | WEIGHT: 266 LBS | HEART RATE: 69 BPM | SYSTOLIC BLOOD PRESSURE: 134 MMHG | TEMPERATURE: 97.5 F

## 2023-12-26 DIAGNOSIS — E11.69 DIABETES MELLITUS TYPE 2 IN OBESE (HCC): Chronic | ICD-10-CM

## 2023-12-26 DIAGNOSIS — E78.5 HYPERLIPIDEMIA, UNSPECIFIED HYPERLIPIDEMIA TYPE: Chronic | ICD-10-CM

## 2023-12-26 DIAGNOSIS — N40.1 BENIGN NON-NODULAR PROSTATIC HYPERPLASIA WITH LOWER URINARY TRACT SYMPTOMS: Chronic | ICD-10-CM

## 2023-12-26 DIAGNOSIS — I10 PRIMARY HYPERTENSION: ICD-10-CM

## 2023-12-26 DIAGNOSIS — I50.32 CHRONIC HEART FAILURE WITH PRESERVED EJECTION FRACTION (HFPEF) (HCC): ICD-10-CM

## 2023-12-26 DIAGNOSIS — E03.9 HYPOTHYROIDISM, UNSPECIFIED TYPE: ICD-10-CM

## 2023-12-26 DIAGNOSIS — E66.9 DIABETES MELLITUS TYPE 2 IN OBESE (HCC): Chronic | ICD-10-CM

## 2023-12-26 DIAGNOSIS — M1A.9XX0 CHRONIC GOUT WITHOUT TOPHUS, UNSPECIFIED CAUSE, UNSPECIFIED SITE: Chronic | ICD-10-CM

## 2023-12-26 DIAGNOSIS — Z23 NEED FOR INFLUENZA VACCINATION: Primary | ICD-10-CM

## 2023-12-26 PROBLEM — M79.89 LEFT LEG SWELLING: Status: RESOLVED | Noted: 2023-01-24 | Resolved: 2023-12-26

## 2023-12-26 LAB
ALBUMIN SERPL-MCNC: 4.3 G/DL (ref 3.4–5)
ALBUMIN/GLOB SERPL: 1.3 {RATIO} (ref 1.1–2.2)
ALP SERPL-CCNC: 79 U/L (ref 40–129)
ALT SERPL-CCNC: 7 U/L (ref 10–40)
ANION GAP SERPL CALCULATED.3IONS-SCNC: 8 MMOL/L (ref 3–16)
AST SERPL-CCNC: 13 U/L (ref 15–37)
BASOPHILS # BLD: 0 K/UL (ref 0–0.2)
BASOPHILS NFR BLD: 0.6 %
BILIRUB SERPL-MCNC: 0.3 MG/DL (ref 0–1)
BUN SERPL-MCNC: 17 MG/DL (ref 7–20)
CALCIUM SERPL-MCNC: 10 MG/DL (ref 8.3–10.6)
CHLORIDE SERPL-SCNC: 106 MMOL/L (ref 99–110)
CHOLEST SERPL-MCNC: 304 MG/DL (ref 0–199)
CO2 SERPL-SCNC: 33 MMOL/L (ref 21–32)
CREAT SERPL-MCNC: 1.4 MG/DL (ref 0.8–1.3)
DEPRECATED RDW RBC AUTO: 14.9 % (ref 12.4–15.4)
EOSINOPHIL # BLD: 0.3 K/UL (ref 0–0.6)
EOSINOPHIL NFR BLD: 5.8 %
GFR SERPLBLD CREATININE-BSD FMLA CKD-EPI: 48 ML/MIN/{1.73_M2}
GLUCOSE SERPL-MCNC: 117 MG/DL (ref 70–99)
HCT VFR BLD AUTO: 40.8 % (ref 40.5–52.5)
HDLC SERPL-MCNC: 41 MG/DL (ref 40–60)
HGB BLD-MCNC: 13.3 G/DL (ref 13.5–17.5)
LDLC SERPL CALC-MCNC: 234 MG/DL
LYMPHOCYTES # BLD: 1.3 K/UL (ref 1–5.1)
LYMPHOCYTES NFR BLD: 30 %
MCH RBC QN AUTO: 29.7 PG (ref 26–34)
MCHC RBC AUTO-ENTMCNC: 32.6 G/DL (ref 31–36)
MCV RBC AUTO: 91 FL (ref 80–100)
MONOCYTES # BLD: 0.4 K/UL (ref 0–1.3)
MONOCYTES NFR BLD: 8.1 %
NEUTROPHILS # BLD: 2.5 K/UL (ref 1.7–7.7)
NEUTROPHILS NFR BLD: 55.5 %
PLATELET # BLD AUTO: 185 K/UL (ref 135–450)
PMV BLD AUTO: 9.2 FL (ref 5–10.5)
POTASSIUM SERPL-SCNC: 4.1 MMOL/L (ref 3.5–5.1)
PROT SERPL-MCNC: 7.5 G/DL (ref 6.4–8.2)
RBC # BLD AUTO: 4.48 M/UL (ref 4.2–5.9)
SODIUM SERPL-SCNC: 147 MMOL/L (ref 136–145)
TRIGL SERPL-MCNC: 143 MG/DL (ref 0–150)
URATE SERPL-MCNC: 12 MG/DL (ref 3.5–7.2)
VLDLC SERPL CALC-MCNC: 29 MG/DL
WBC # BLD AUTO: 4.4 K/UL (ref 4–11)

## 2023-12-26 PROCEDURE — 3044F HG A1C LEVEL LT 7.0%: CPT | Performed by: INTERNAL MEDICINE

## 2023-12-26 PROCEDURE — 1123F ACP DISCUSS/DSCN MKR DOCD: CPT | Performed by: INTERNAL MEDICINE

## 2023-12-26 PROCEDURE — 99214 OFFICE O/P EST MOD 30 MIN: CPT | Performed by: INTERNAL MEDICINE

## 2023-12-26 PROCEDURE — 90694 VACC AIIV4 NO PRSRV 0.5ML IM: CPT | Performed by: INTERNAL MEDICINE

## 2023-12-26 PROCEDURE — G0008 ADMIN INFLUENZA VIRUS VAC: HCPCS | Performed by: INTERNAL MEDICINE

## 2023-12-26 RX ORDER — TORSEMIDE 100 MG/1
100 TABLET ORAL
Qty: 90 TABLET | Refills: 1 | Status: SHIPPED | OUTPATIENT
Start: 2023-12-26

## 2023-12-26 RX ORDER — TAMSULOSIN HYDROCHLORIDE 0.4 MG/1
0.4 CAPSULE ORAL DAILY
Qty: 90 CAPSULE | Refills: 1 | Status: SHIPPED | OUTPATIENT
Start: 2023-12-26

## 2023-12-26 RX ORDER — ROSUVASTATIN CALCIUM 10 MG/1
10 TABLET, COATED ORAL NIGHTLY
Qty: 90 TABLET | Refills: 1 | Status: SHIPPED | OUTPATIENT
Start: 2023-12-26

## 2023-12-26 NOTE — ASSESSMENT & PLAN NOTE
Uncontrolled, we restarted allopurinol renally dosed 50 mg 05/23 due to uric acid 10 and history of CKD. -continue allopurinol 50 mg for now.  Repeat uric acid now for further dose adjustment

## 2023-12-26 NOTE — ASSESSMENT & PLAN NOTE
stable  -Echo during admission 12/22- Mild LVH, EF 64-76%, grade 1 diastolic dysfunction, systolic septal flattening consistent with right ventricular pressure, RA and RV dilated, moderate to severe TR, elevated pulmonary artery pressure 90 mmHg  -Beta-blocker stopped due to junctional rhythm during admission  -Continue torsemide  -needs to establish with cards, refered to dr. Jaspreet Lockhart

## 2023-12-26 NOTE — PROGRESS NOTES
[facility] Internal Medicine  Follow up visit   2023    Doron Connell (:  5/3/1934) is a 80 y.o. male, here for evaluation of the following medical concerns:    Chief Complaint   Patient presents with    Hypertension    Hyperlipidemia    Hypothyroidism        ASSESSMENT/ PLAN:    Diabetes mellitus type 2 in obese ([address])  A1c 6.5% , Known CKD stage III. -Currently not on treatment and is managed with lifestyle. Was on Lantus, semaglutide and Januvia before  - Repeat HbA1c. AIC goal< 7    - BP within goal.  Not on ACE/ARB  - LDL above goal, I changed simvastatin 20 mg to crestor 10 mg but due to his daughter passing he did not get out msg. Will change now   - he self stopped ASA   - low carb diet and regular exercise  - regular foot care  - repeat BMP, microalbuminuria 286 2023  - uptodate w/ PSV23 vaccine. - Follow up in 3 months    Hypothyroidism  Now within goal TSH   -Continue 25 mcg    Primary hypertension  Well controlled  -continue amlodipine 5 mg     Chronic gout without tophus  Uncontrolled, we restarted allopurinol renally dosed 50 mg  due to uric acid 10 and history of CKD. -continue allopurinol 50 mg for now.  Repeat uric acid now for further dose adjustment     Chronic heart failure with preserved ejection fraction (HFpEF) ([facility])  stable  -Echo during admission - Mild LVH, EF 82-19%, grade 1 diastolic dysfunction, systolic septal flattening consistent with right ventricular pressure, RA and RV dilated, moderate to severe TR, elevated pulmonary artery pressure 90 mmHg  -Beta-blocker stopped due to junctional rhythm during admission  -Continue torsemide  -needs to establish with cards, refered to dr. Lashanda Summers This Encounter   Procedures    Hemoglobin A1C    Lipid Panel    Comprehensive Metabolic Panel    Uric Acid    CBC with Auto Differential     Orders Placed This Encounter   Medications    tamsulosin (FLOMAX) 0.4 MG capsule     Sig: Take 1 capsule by

## 2023-12-26 NOTE — ASSESSMENT & PLAN NOTE
A1c 6.5% 1/23, Known CKD stage III. -Currently not on treatment and is managed with lifestyle. Was on Lantus, semaglutide and Januvia before  - Repeat HbA1c. AIC goal< 7    - BP within goal.  Not on ACE/ARB  - LDL above goal, I changed simvastatin 20 mg to crestor 10 mg but due to his daughter passing he did not get out msg. Will change now   - he self stopped ASA   - low carb diet and regular exercise  - regular foot care  - repeat BMP, microalbuminuria 286 08/2023  - uptodate w/ PSV23 vaccine.   - Follow up in 3 months

## 2023-12-27 DIAGNOSIS — M1A.9XX0 CHRONIC GOUT WITHOUT TOPHUS, UNSPECIFIED CAUSE, UNSPECIFIED SITE: Primary | Chronic | ICD-10-CM

## 2023-12-27 DIAGNOSIS — E78.5 HYPERLIPIDEMIA, UNSPECIFIED HYPERLIPIDEMIA TYPE: Chronic | ICD-10-CM

## 2023-12-27 LAB
EST. AVERAGE GLUCOSE BLD GHB EST-MCNC: 165.7 MG/DL
HBA1C MFR BLD: 7.4 %

## 2023-12-27 RX ORDER — ALLOPURINOL 100 MG/1
100 TABLET ORAL DAILY
Qty: 90 TABLET | Refills: 1 | Status: SHIPPED | OUTPATIENT
Start: 2023-12-27

## 2024-04-22 RX ORDER — AMLODIPINE BESYLATE 5 MG/1
5 TABLET ORAL DAILY
Qty: 90 TABLET | Refills: 0 | Status: SHIPPED | OUTPATIENT
Start: 2024-04-22

## 2024-05-01 RX ORDER — LEVOTHYROXINE SODIUM 0.03 MG/1
25 TABLET ORAL DAILY
Qty: 90 TABLET | Refills: 1 | Status: SHIPPED | OUTPATIENT
Start: 2024-05-01

## 2024-05-15 ENCOUNTER — OFFICE VISIT (OUTPATIENT)
Dept: INTERNAL MEDICINE CLINIC | Age: 89
End: 2024-05-15

## 2024-05-15 VITALS
WEIGHT: 250 LBS | HEART RATE: 75 BPM | SYSTOLIC BLOOD PRESSURE: 138 MMHG | BODY MASS INDEX: 40.35 KG/M2 | OXYGEN SATURATION: 98 % | DIASTOLIC BLOOD PRESSURE: 76 MMHG

## 2024-05-15 DIAGNOSIS — I50.32 CHRONIC HEART FAILURE WITH PRESERVED EJECTION FRACTION (HFPEF) (HCC): ICD-10-CM

## 2024-05-15 DIAGNOSIS — N40.1 BENIGN NON-NODULAR PROSTATIC HYPERPLASIA WITH LOWER URINARY TRACT SYMPTOMS: Primary | Chronic | ICD-10-CM

## 2024-05-15 DIAGNOSIS — J96.11 CHRONIC HYPOXEMIC RESPIRATORY FAILURE (HCC): ICD-10-CM

## 2024-05-15 DIAGNOSIS — N39.41 URGE INCONTINENCE: ICD-10-CM

## 2024-05-15 DIAGNOSIS — R80.1 PERSISTENT PROTEINURIA: Chronic | ICD-10-CM

## 2024-05-15 DIAGNOSIS — E78.5 HYPERLIPIDEMIA, UNSPECIFIED HYPERLIPIDEMIA TYPE: Chronic | ICD-10-CM

## 2024-05-15 DIAGNOSIS — N18.31 STAGE 3A CHRONIC KIDNEY DISEASE (HCC): ICD-10-CM

## 2024-05-15 DIAGNOSIS — M1A.9XX0 CHRONIC GOUT WITHOUT TOPHUS, UNSPECIFIED CAUSE, UNSPECIFIED SITE: Chronic | ICD-10-CM

## 2024-05-15 DIAGNOSIS — J43.9 PULMONARY EMPHYSEMA, UNSPECIFIED EMPHYSEMA TYPE (HCC): ICD-10-CM

## 2024-05-15 DIAGNOSIS — E11.69 TYPE 2 DIABETES MELLITUS WITH OBESITY (HCC): ICD-10-CM

## 2024-05-15 DIAGNOSIS — E66.9 TYPE 2 DIABETES MELLITUS WITH OBESITY (HCC): ICD-10-CM

## 2024-05-15 PROBLEM — I87.2 VENOUS STASIS DERMATITIS OF BOTH LOWER EXTREMITIES: Status: RESOLVED | Noted: 2017-08-09 | Resolved: 2024-05-15

## 2024-05-15 RX ORDER — ROSUVASTATIN CALCIUM 10 MG/1
10 TABLET, COATED ORAL NIGHTLY
Qty: 90 TABLET | Refills: 1 | Status: SHIPPED | OUTPATIENT
Start: 2024-05-15

## 2024-05-15 ASSESSMENT — PATIENT HEALTH QUESTIONNAIRE - PHQ9
SUM OF ALL RESPONSES TO PHQ9 QUESTIONS 1 & 2: 0
SUM OF ALL RESPONSES TO PHQ QUESTIONS 1-9: 0
1. LITTLE INTEREST OR PLEASURE IN DOING THINGS: NOT AT ALL
2. FEELING DOWN, DEPRESSED OR HOPELESS: NOT AT ALL
SUM OF ALL RESPONSES TO PHQ QUESTIONS 1-9: 0

## 2024-05-15 ASSESSMENT — ENCOUNTER SYMPTOMS: SHORTNESS OF BREATH: 0

## 2024-05-15 NOTE — PROGRESS NOTES
[facility] Internal Medicine  Follow up visit   5/15/2024    Rakan Larson (:  5/3/1934) is a 89 y.o. male, here for evaluation of the following medical concerns:    Chief Complaint   Patient presents with    Urinary Frequency     Over active bladder, would like medication for this.    Follow-up        ASSESSMENT/ PLAN:  Benign non-nodular prostatic hyperplasia with lower urinary tract symptoms  Last PSA 10 from 4 before. Used to see urology, explained he needs to go back for eval. He is on tamsulosin for LUTS symp in the past. Today reports urge incontinence. Would like him to be reevaluated by urology given those.     Chronic heart failure with preserved ejection fraction (HFpEF) ([facility])  Stable, euvolemic today  -Echo - Mild LVH, EF 55-60%, grade 1 diastolic dysfunction, systolic septal flattening consistent with right ventricular pressure, RA and RV dilated, moderate to severe TR, elevated pulmonary artery pressure 90 mmHg  -Beta-blocker stopped due to junctional rhythm  -Continue torsemide  -starting jardiance for DM, HF and proteinuria   -we started statin for LDL >200 last visit, did not repeat labs for today yet, will repeat now. If remains above goal plan to increase dose   -needs to establish with cards, refered to dr. Tracey last visit     Chronic hypoxemic respiratory failure (HCC)  Multifactorial   -had been on supplemental oxygen since admission , saturation well today without oxygen. Still uses 2.5 L O2 at home with rest and exertion, but never ot of the house despite having portable.    Chronic kidney disease, stage III (moderate)  -baseline GFR 40-50s.   -He is status post nephrectomy, unclear why  -established with dr Sandhu  -microalbuminuria 289     Type 2 diabetes mellitus with obesity ([facility])  A1c 7.4% , Known CKD stage III with proteinuria.   -Currently not on treatment and is managed with lifestyle.  Was on Lantus, semaglutide and Januvia before. We did discussed addign

## 2024-05-15 NOTE — ASSESSMENT & PLAN NOTE
-baseline GFR 40-50s.   -He is status post nephrectomy, unclear why  -established with dr Sandhu  -microalbuminuria 289 08/23

## 2024-05-15 NOTE — ASSESSMENT & PLAN NOTE
Carries this diagnosis but no emphysematous changes seen on last CT chest done in 2016 and 2022.  He did have PFTs that were unreliable for conclusion due to inadequate performance. Now on chronic o2 since 2022. Clinically no respiratory complaints, but need repeat PFTs if new symp arise.

## 2024-05-15 NOTE — ASSESSMENT & PLAN NOTE
Multifactorial   -had been on supplemental oxygen since admission 12/22, saturation well today without oxygen. Still uses 2.5 L O2 at home with rest and exertion, but never ot of the house despite having portable.

## 2024-05-15 NOTE — ASSESSMENT & PLAN NOTE
Last PSA 10 from 4 before. Used to see urology, explained he needs to go back for eval. He is on tamsulosin for LUTS symp in the past. Today reports urge incontinence. Would like him to be reevaluated by urology given those.

## 2024-05-15 NOTE — ASSESSMENT & PLAN NOTE
Uncontrolled, we restarted allopurinol renally dosed 50 mg 05/23 due to uric acid 10 and history of CKD and increased dose to 100 mg 12/23 for uric acid 12.   -continue allopurinol 100 mg for now. Repeat uric acid now for further dose adjustment

## 2024-05-15 NOTE — ASSESSMENT & PLAN NOTE
Stable, euvolemic today  -Echo 12/22- Mild LVH, EF 55-60%, grade 1 diastolic dysfunction, systolic septal flattening consistent with right ventricular pressure, RA and RV dilated, moderate to severe TR, elevated pulmonary artery pressure 90 mmHg  -Beta-blocker stopped due to junctional rhythm  -Continue torsemide  -starting jardiance for DM, HF and proteinuria   -we started statin for LDL >200 last visit, did not repeat labs for today yet, will repeat now. If remains above goal plan to increase dose   -needs to establish with cards, refered to dr. Tracey last visit

## 2024-05-15 NOTE — ASSESSMENT & PLAN NOTE
A1c 7.4% 08/23, Known CKD stage III with proteinuria.   -Currently not on treatment and is managed with lifestyle.  Was on Lantus, semaglutide and Januvia before. We did discussed addign SGLP2 due to proteinuria and HF. Will add jardiance 10 mg   - BP fair.  Not on ACE/ARB  - LDL above goal, I changed simvastatin 20 mg to crestor 10 mg last visit, repeat labs now  - he self stopped ASA   - low carb diet and regular exercise  - regular foot care  - repeat BMP, microalbuminuria 286 08/2023  - uptodate w/ PSV23 vaccine.  - Follow up in 3 months

## 2024-05-16 LAB
ALBUMIN SERPL-MCNC: 4.3 G/DL (ref 3.4–5)
ALBUMIN/GLOB SERPL: 1.2 {RATIO} (ref 1.1–2.2)
ALP SERPL-CCNC: 88 U/L (ref 40–129)
ALT SERPL-CCNC: 6 U/L (ref 10–40)
ANION GAP SERPL CALCULATED.3IONS-SCNC: 10 MMOL/L (ref 3–16)
AST SERPL-CCNC: 11 U/L (ref 15–37)
BILIRUB SERPL-MCNC: 0.4 MG/DL (ref 0–1)
BUN SERPL-MCNC: 16 MG/DL (ref 7–20)
CALCIUM SERPL-MCNC: 10.6 MG/DL (ref 8.3–10.6)
CHLORIDE SERPL-SCNC: 104 MMOL/L (ref 99–110)
CHOLEST SERPL-MCNC: 186 MG/DL (ref 0–199)
CO2 SERPL-SCNC: 31 MMOL/L (ref 21–32)
CREAT SERPL-MCNC: 1.6 MG/DL (ref 0.8–1.3)
GFR SERPLBLD CREATININE-BSD FMLA CKD-EPI: 41 ML/MIN/{1.73_M2}
GLUCOSE SERPL-MCNC: 104 MG/DL (ref 70–99)
HDLC SERPL-MCNC: 47 MG/DL (ref 40–60)
LDLC SERPL CALC-MCNC: 111 MG/DL
POTASSIUM SERPL-SCNC: 3.7 MMOL/L (ref 3.5–5.1)
PROT SERPL-MCNC: 7.8 G/DL (ref 6.4–8.2)
SODIUM SERPL-SCNC: 145 MMOL/L (ref 136–145)
TRIGL SERPL-MCNC: 142 MG/DL (ref 0–150)
URATE SERPL-MCNC: 9 MG/DL (ref 3.5–7.2)
VLDLC SERPL CALC-MCNC: 28 MG/DL

## 2024-05-22 DIAGNOSIS — M1A.9XX0 CHRONIC GOUT WITHOUT TOPHUS, UNSPECIFIED CAUSE, UNSPECIFIED SITE: Chronic | ICD-10-CM

## 2024-05-22 RX ORDER — ALLOPURINOL 100 MG/1
150 TABLET ORAL DAILY
Qty: 135 TABLET | Refills: 1 | Status: SHIPPED | OUTPATIENT
Start: 2024-05-22

## 2024-05-23 ENCOUNTER — TELEPHONE (OUTPATIENT)
Dept: INTERNAL MEDICINE CLINIC | Age: 89
End: 2024-05-23

## 2024-05-23 NOTE — TELEPHONE ENCOUNTER
Pts son returned the call from Niyah. Advised Haresh of the Lab results and the increase in the allopurinol to 1.5 pills.  They will let Rakan know

## 2024-06-21 RX ORDER — TORSEMIDE 100 MG/1
100 TABLET ORAL
Qty: 90 TABLET | Refills: 1 | Status: SHIPPED | OUTPATIENT
Start: 2024-06-21

## 2024-08-09 DIAGNOSIS — N40.1 BENIGN NON-NODULAR PROSTATIC HYPERPLASIA WITH LOWER URINARY TRACT SYMPTOMS: Chronic | ICD-10-CM

## 2024-08-09 RX ORDER — TAMSULOSIN HYDROCHLORIDE 0.4 MG/1
0.4 CAPSULE ORAL DAILY
Qty: 90 CAPSULE | Refills: 1 | Status: SHIPPED | OUTPATIENT
Start: 2024-08-09

## 2024-08-15 ENCOUNTER — OFFICE VISIT (OUTPATIENT)
Dept: INTERNAL MEDICINE CLINIC | Age: 89
End: 2024-08-15

## 2024-08-15 VITALS
WEIGHT: 249 LBS | HEART RATE: 71 BPM | OXYGEN SATURATION: 92 % | TEMPERATURE: 98 F | BODY MASS INDEX: 40.19 KG/M2 | SYSTOLIC BLOOD PRESSURE: 134 MMHG | DIASTOLIC BLOOD PRESSURE: 80 MMHG

## 2024-08-15 DIAGNOSIS — I10 PRIMARY HYPERTENSION: ICD-10-CM

## 2024-08-15 DIAGNOSIS — E66.9 TYPE 2 DIABETES MELLITUS WITH OBESITY (HCC): Primary | ICD-10-CM

## 2024-08-15 DIAGNOSIS — I50.32 CHRONIC HEART FAILURE WITH PRESERVED EJECTION FRACTION (HFPEF) (HCC): ICD-10-CM

## 2024-08-15 DIAGNOSIS — E11.69 TYPE 2 DIABETES MELLITUS WITH OBESITY (HCC): Primary | ICD-10-CM

## 2024-08-15 DIAGNOSIS — E03.9 HYPOTHYROIDISM, UNSPECIFIED TYPE: ICD-10-CM

## 2024-08-15 DIAGNOSIS — E66.9 TYPE 2 DIABETES MELLITUS WITH OBESITY (HCC): ICD-10-CM

## 2024-08-15 DIAGNOSIS — E11.69 TYPE 2 DIABETES MELLITUS WITH OBESITY (HCC): ICD-10-CM

## 2024-08-15 DIAGNOSIS — M1A.9XX0 CHRONIC GOUT WITHOUT TOPHUS, UNSPECIFIED CAUSE, UNSPECIFIED SITE: Chronic | ICD-10-CM

## 2024-08-15 LAB
ALBUMIN SERPL-MCNC: 4.1 G/DL (ref 3.4–5)
ALBUMIN/GLOB SERPL: 1.2 {RATIO} (ref 1.1–2.2)
ALP SERPL-CCNC: 84 U/L (ref 40–129)
ALT SERPL-CCNC: 6 U/L (ref 10–40)
ANION GAP SERPL CALCULATED.3IONS-SCNC: 10 MMOL/L (ref 3–16)
AST SERPL-CCNC: 15 U/L (ref 15–37)
BASOPHILS # BLD: 0 K/UL (ref 0–0.2)
BASOPHILS NFR BLD: 0.5 %
BILIRUB SERPL-MCNC: 0.3 MG/DL (ref 0–1)
BUN SERPL-MCNC: 20 MG/DL (ref 7–20)
CALCIUM SERPL-MCNC: 10.7 MG/DL (ref 8.3–10.6)
CHLORIDE SERPL-SCNC: 105 MMOL/L (ref 99–110)
CO2 SERPL-SCNC: 30 MMOL/L (ref 21–32)
CREAT SERPL-MCNC: 1.6 MG/DL (ref 0.8–1.3)
CREAT UR-MCNC: 86.5 MG/DL (ref 39–259)
DEPRECATED RDW RBC AUTO: 17 % (ref 12.4–15.4)
EOSINOPHIL # BLD: 0.3 K/UL (ref 0–0.6)
EOSINOPHIL NFR BLD: 6.1 %
EST. AVERAGE GLUCOSE BLD GHB EST-MCNC: 148.5 MG/DL
GFR SERPLBLD CREATININE-BSD FMLA CKD-EPI: 41 ML/MIN/{1.73_M2}
GLUCOSE SERPL-MCNC: 103 MG/DL (ref 70–99)
HBA1C MFR BLD: 6.8 %
HCT VFR BLD AUTO: 39.1 % (ref 40.5–52.5)
HGB BLD-MCNC: 12.5 G/DL (ref 13.5–17.5)
LYMPHOCYTES # BLD: 1.5 K/UL (ref 1–5.1)
LYMPHOCYTES NFR BLD: 31.6 %
MCH RBC QN AUTO: 29.8 PG (ref 26–34)
MCHC RBC AUTO-ENTMCNC: 32 G/DL (ref 31–36)
MCV RBC AUTO: 93.2 FL (ref 80–100)
MICROALBUMIN UR DL<=1MG/L-MCNC: 70 MG/DL
MICROALBUMIN/CREAT UR: 809.2 MG/G (ref 0–30)
MONOCYTES # BLD: 0.4 K/UL (ref 0–1.3)
MONOCYTES NFR BLD: 7.6 %
NEUTROPHILS # BLD: 2.6 K/UL (ref 1.7–7.7)
NEUTROPHILS NFR BLD: 54.2 %
PLATELET # BLD AUTO: 186 K/UL (ref 135–450)
PMV BLD AUTO: 9.4 FL (ref 5–10.5)
POTASSIUM SERPL-SCNC: 3.8 MMOL/L (ref 3.5–5.1)
PROT SERPL-MCNC: 7.5 G/DL (ref 6.4–8.2)
RBC # BLD AUTO: 4.19 M/UL (ref 4.2–5.9)
SODIUM SERPL-SCNC: 145 MMOL/L (ref 136–145)
TSH SERPL DL<=0.005 MIU/L-ACNC: 4.85 UIU/ML (ref 0.27–4.2)
URATE SERPL-MCNC: 6 MG/DL (ref 3.5–7.2)
WBC # BLD AUTO: 4.8 K/UL (ref 4–11)

## 2024-08-15 SDOH — ECONOMIC STABILITY: FOOD INSECURITY: WITHIN THE PAST 12 MONTHS, YOU WORRIED THAT YOUR FOOD WOULD RUN OUT BEFORE YOU GOT MONEY TO BUY MORE.: NEVER TRUE

## 2024-08-15 SDOH — ECONOMIC STABILITY: FOOD INSECURITY: WITHIN THE PAST 12 MONTHS, THE FOOD YOU BOUGHT JUST DIDN'T LAST AND YOU DIDN'T HAVE MONEY TO GET MORE.: NEVER TRUE

## 2024-08-15 SDOH — ECONOMIC STABILITY: INCOME INSECURITY: HOW HARD IS IT FOR YOU TO PAY FOR THE VERY BASICS LIKE FOOD, HOUSING, MEDICAL CARE, AND HEATING?: NOT HARD AT ALL

## 2024-08-15 ASSESSMENT — ENCOUNTER SYMPTOMS: SHORTNESS OF BREATH: 0

## 2024-08-15 NOTE — PROGRESS NOTES
[place] Internal Medicine  Follow up visit   8/15/2024    Rakan Larson (:  5/3/1934) is a 89 y.o. male, here for evaluation of the following medical concerns:    Chief Complaint   Patient presents with    Diabetes    Hypothyroidism           Hypertension        ASSESSMENT/ PLAN:  Type 2 diabetes mellitus with obesity (HCC)  A1c 7.4% , Known CKD stage III with proteinuria.   -doing well with jardiance 10 mg which was added last visit, plan to repeat A1c before next visit   - BP fair.  Not on ACE/ARB  - LDL improved sig after changing simvastatin 20 mg to crestor 10 mg last visit  - he self stopped ASA   - low carb diet and regular exercise, regular foot care  - repeat BMP, microalbuminuria 286 2023  - uptodate w/ PSV23 vaccine.  - Follow up in 4 months    Primary hypertension  Well controlled  -continue amlodipine 5 mg     Hypothyroidism  Last TSH within goal   -Continue 25 mcg  -repeat TSH    Chronic gout without tophus  Uncontrolled, Uric acid above gaol but improving. We increased allopurinol to 1.5 pills last visit .   -continue allopurinol 150 mg for now. Repeat uric acid now for further dose adjustment     Chronic heart failure with preserved ejection fraction (HFpEF) ([facility])  Stable, euvolemic today  -Echo - Mild LVH, EF 55-60%, grade 1 diastolic dysfunction, systolic septal flattening consistent with right ventricular pressure, RA and RV dilated, moderate to severe TR, elevated pulmonary artery pressure 90 mmHg  -Beta-blocker stopped due to junctional rhythm  -Continue torsemide, tolerating jardiance (for DM, HF and proteinuria) well    -continue crestor    -needs to establish with cards, refered to dr. Tracey last visit       Orders Placed This Encounter   Procedures    Uric Acid    Hemoglobin A1C    Comprehensive Metabolic Panel    TSH with Reflex to FT4 ([place] Only)    CBC with Auto Differential    MICROALBUMIN / CREATININE URINE RATIO     No orders of the defined types were

## 2024-08-15 NOTE — ASSESSMENT & PLAN NOTE
Stable, euvolemic today  -Echo 12/22- Mild LVH, EF 55-60%, grade 1 diastolic dysfunction, systolic septal flattening consistent with right ventricular pressure, RA and RV dilated, moderate to severe TR, elevated pulmonary artery pressure 90 mmHg  -Beta-blocker stopped due to junctional rhythm  -Continue torsemide, tolerating jardiance (for DM, HF and proteinuria) well    -continue crestor    -needs to establish with cards, refered to dr. Tracey last visit

## 2024-08-15 NOTE — ASSESSMENT & PLAN NOTE
Uncontrolled, Uric acid above gaol but improving. We increased allopurinol to 1.5 pills last visit .   -continue allopurinol 150 mg for now. Repeat uric acid now for further dose adjustment

## 2024-08-15 NOTE — ASSESSMENT & PLAN NOTE
A1c 7.4% 08/23, Known CKD stage III with proteinuria.   -doing well with jardiance 10 mg which was added last visit, plan to repeat A1c before next visit   - BP fair.  Not on ACE/ARB  - LDL improved sig after changing simvastatin 20 mg to crestor 10 mg last visit  - he self stopped ASA   - low carb diet and regular exercise, regular foot care  - repeat BMP, microalbuminuria 286 08/2023  - uptodate w/ PSV23 vaccine.  - Follow up in 4 months

## 2024-08-16 LAB — T4 FREE SERPL-MCNC: 1.1 NG/DL (ref 0.9–1.8)

## 2024-08-29 RX ORDER — AMLODIPINE BESYLATE 5 MG/1
5 TABLET ORAL DAILY
Qty: 90 TABLET | Refills: 0 | Status: SHIPPED | OUTPATIENT
Start: 2024-08-29

## 2024-08-29 NOTE — TELEPHONE ENCOUNTER
Pt son is calling for refill for pt medication below    [phone] (Haresh)      amLODIPine (NORVASC) 5 MG tablet     [facility] [id number] - [place] [address] - P [phone] - F [phone]

## 2024-10-17 RX ORDER — LEVOTHYROXINE SODIUM 25 UG/1
25 TABLET ORAL DAILY
Qty: 90 TABLET | Refills: 1 | Status: SHIPPED | OUTPATIENT
Start: 2024-10-17

## 2024-11-14 DIAGNOSIS — Z79.4 TYPE 2 DIABETES MELLITUS WITH STAGE 3A CHRONIC KIDNEY DISEASE, WITH LONG-TERM CURRENT USE OF INSULIN (HCC): ICD-10-CM

## 2024-11-14 DIAGNOSIS — E11.22 TYPE 2 DIABETES MELLITUS WITH STAGE 3A CHRONIC KIDNEY DISEASE, WITH LONG-TERM CURRENT USE OF INSULIN (HCC): ICD-10-CM

## 2024-11-14 DIAGNOSIS — N18.31 TYPE 2 DIABETES MELLITUS WITH STAGE 3A CHRONIC KIDNEY DISEASE, WITH LONG-TERM CURRENT USE OF INSULIN (HCC): ICD-10-CM

## 2024-11-18 RX ORDER — AMLODIPINE BESYLATE 5 MG/1
5 TABLET ORAL DAILY
Qty: 90 TABLET | Refills: 1 | Status: SHIPPED | OUTPATIENT
Start: 2024-11-18

## 2024-11-18 RX ORDER — BLOOD SUGAR DIAGNOSTIC
STRIP MISCELLANEOUS
Qty: 100 STRIP | Refills: 3 | Status: SHIPPED | OUTPATIENT
Start: 2024-11-18

## 2024-12-14 DIAGNOSIS — E78.5 HYPERLIPIDEMIA, UNSPECIFIED HYPERLIPIDEMIA TYPE: Chronic | ICD-10-CM

## 2024-12-16 RX ORDER — ROSUVASTATIN CALCIUM 10 MG/1
10 TABLET, COATED ORAL NIGHTLY
Qty: 90 TABLET | Refills: 1 | Status: SHIPPED | OUTPATIENT
Start: 2024-12-16

## 2024-12-16 NOTE — TELEPHONE ENCOUNTER
Last appointment: 8/15/2024  Next appointment: 1/2/2025  Last refill:  7 months ago (5/15/2024) by Pardeep Cervantes MD   Requested Prescriptions     Pending Prescriptions Disp Refills    rosuvastatin (CRESTOR) 10 MG tablet [Pharmacy Med Name: ROSUVASTATIN CALCIUM 10 MG TAB] 90 tablet 1     Sig: TAKE ONE TABLET BY MOUTH ONCE NIGHTLY

## 2024-12-24 RX ORDER — TORSEMIDE 100 MG/1
100 TABLET ORAL
Qty: 90 TABLET | Refills: 1 | Status: SHIPPED | OUTPATIENT
Start: 2024-12-24

## 2025-01-02 ENCOUNTER — OFFICE VISIT (OUTPATIENT)
Dept: INTERNAL MEDICINE CLINIC | Age: 89
End: 2025-01-02

## 2025-01-02 VITALS
DIASTOLIC BLOOD PRESSURE: 78 MMHG | WEIGHT: 250 LBS | HEIGHT: 66 IN | HEART RATE: 74 BPM | BODY MASS INDEX: 40.18 KG/M2 | SYSTOLIC BLOOD PRESSURE: 132 MMHG | OXYGEN SATURATION: 94 % | TEMPERATURE: 97.4 F

## 2025-01-02 DIAGNOSIS — E66.9 TYPE 2 DIABETES MELLITUS WITH OBESITY (HCC): ICD-10-CM

## 2025-01-02 DIAGNOSIS — E11.69 TYPE 2 DIABETES MELLITUS WITH OBESITY (HCC): ICD-10-CM

## 2025-01-02 DIAGNOSIS — I50.32 CHRONIC HEART FAILURE WITH PRESERVED EJECTION FRACTION (HFPEF) (HCC): ICD-10-CM

## 2025-01-02 DIAGNOSIS — J96.11 CHRONIC HYPOXEMIC RESPIRATORY FAILURE: ICD-10-CM

## 2025-01-02 DIAGNOSIS — R80.1 PERSISTENT PROTEINURIA: Chronic | ICD-10-CM

## 2025-01-02 DIAGNOSIS — Z23 NEED FOR INFLUENZA VACCINATION: ICD-10-CM

## 2025-01-02 DIAGNOSIS — I27.20 MODERATE TO SEVERE PULMONARY HYPERTENSION (HCC): ICD-10-CM

## 2025-01-02 DIAGNOSIS — J43.9 PULMONARY EMPHYSEMA, UNSPECIFIED EMPHYSEMA TYPE (HCC): ICD-10-CM

## 2025-01-02 DIAGNOSIS — Z00.00 MEDICARE ANNUAL WELLNESS VISIT, SUBSEQUENT: Primary | ICD-10-CM

## 2025-01-02 DIAGNOSIS — N18.31 STAGE 3A CHRONIC KIDNEY DISEASE (HCC): ICD-10-CM

## 2025-01-02 DIAGNOSIS — E03.9 HYPOTHYROIDISM, UNSPECIFIED TYPE: ICD-10-CM

## 2025-01-02 DIAGNOSIS — M1A.9XX0 CHRONIC GOUT WITHOUT TOPHUS, UNSPECIFIED CAUSE, UNSPECIFIED SITE: Chronic | ICD-10-CM

## 2025-01-02 RX ORDER — ALLOPURINOL 100 MG/1
150 TABLET ORAL DAILY
Qty: 135 TABLET | Refills: 1 | Status: SHIPPED | OUTPATIENT
Start: 2025-01-02

## 2025-01-02 ASSESSMENT — PATIENT HEALTH QUESTIONNAIRE - PHQ9
SUM OF ALL RESPONSES TO PHQ QUESTIONS 1-9: 0
SUM OF ALL RESPONSES TO PHQ QUESTIONS 1-9: 0
2. FEELING DOWN, DEPRESSED OR HOPELESS: NOT AT ALL
SUM OF ALL RESPONSES TO PHQ QUESTIONS 1-9: 0
1. LITTLE INTEREST OR PLEASURE IN DOING THINGS: NOT AT ALL
SUM OF ALL RESPONSES TO PHQ9 QUESTIONS 1 & 2: 0
SUM OF ALL RESPONSES TO PHQ QUESTIONS 1-9: 0

## 2025-01-02 NOTE — ASSESSMENT & PLAN NOTE
A1c down to 6.8% 08/24, Known CKD stage III with proteinuria.   -doing well with jardiance 10 mg, but given worsening microalbuminuria will repeat urine protein today, if persistent will increase dose to 25 mg  - BP fair.  Not on ACE/ARB  - LDL improved sig after changing simvastatin 20 mg to crestor 10 mg  - he self stopped ASA   - low carb diet and regular exercise, regular foot care  - microalbuminuria 806 08/2024  - uptodate w/ PSV23 vaccine.  - Follow up in 4 months

## 2025-01-02 NOTE — PROGRESS NOTES
Medicare Annual Wellness Visit    Rakan Larson is here for Medicare AWV    Assessment & Plan   Medicare annual wellness visit, subsequent  Assessment & Plan:  Here for annual wellness visit, overall doing well from a clinical standpoint, other for below.  As for health maintenance:  -colon, prostate cancer screen: no getting anymore  -BP as below  -negative depression screen  -Independent of ADLs and IADLs, declines sig memory change or further w/u at this point  -Advised to eat a healthy, well-balanced diet  -Advised to exercise at least 30min/day 5x/week for heart and bone health  -Check skin regularly for new moles or changes in moles. wear sunscreen at least SPF 30 and don't forget to reapply every 3-4 hours or if you are in the water  -Follow up with dentist at least twice/year.  -Follow up with eye doctor at least once/year.  -has a living will  Type 2 diabetes mellitus with obesity (HCC)  Assessment & Plan:  A1c down to 6.8% 08/24, Known CKD stage III with proteinuria.   -doing well with jardiance 10 mg, but given worsening microalbuminuria will repeat urine protein today, if persistent will increase dose to 25 mg  - BP fair.  Not on ACE/ARB  - LDL improved sig after changing simvastatin 20 mg to crestor 10 mg  - he self stopped ASA   - low carb diet and regular exercise, regular foot care  - microalbuminuria 806 08/2024  - uptodate w/ PSV23 vaccine.  - Follow up in 4 months  Orders:  -     Hemoglobin A1C; Future  -     Comprehensive Metabolic Panel; Future  -     CBC with Auto Differential; Future  -     empagliflozin (JARDIANCE) 10 MG tablet; Take 1 tablet by mouth daily, Disp-90 tablet, R-1Normal  -     Albumin/Creatinine Ratio, Urine  Chronic hypoxemic respiratory failure  Assessment & Plan:  Multifactorial   -had been on supplemental oxygen since admission 12/22, saturation well today without oxygen. Still uses 2.5 L O2 at home with rest and exertion, but never out of the house despite having

## 2025-01-02 NOTE — ASSESSMENT & PLAN NOTE
Stable  -Echo 12/22- Mild LVH, EF 55-60%, grade 1 diastolic dysfunction, systolic septal flattening consistent with right ventricular pressure, RA and RV dilated, moderate to severe TR, elevated pulmonary artery pressure 90 mmHg  -Beta-blocker stopped due to junctional rhythm  -Continue torsemide, tolerating jardiance (for DM, HF and proteinuria) well    -continue crestor    -still had not established with cards, refered to dr. Tracey last visit

## 2025-01-02 NOTE — ASSESSMENT & PLAN NOTE
Here for annual wellness visit, overall doing well from a clinical standpoint, other for below.  As for health maintenance:  -colon, prostate cancer screen: no getting anymore  -BP as below  -negative depression screen  -Independent of ADLs and IADLs, declines sig memory change or further w/u at this point  -Advised to eat a healthy, well-balanced diet  -Advised to exercise at least 30min/day 5x/week for heart and bone health  -Check skin regularly for new moles or changes in moles. wear sunscreen at least SPF 30 and don't forget to reapply every 3-4 hours or if you are in the water  -Follow up with dentist at least twice/year.  -Follow up with eye doctor at least once/year.  -has a living will

## 2025-01-02 NOTE — ASSESSMENT & PLAN NOTE
Carries this diagnosis but no emphysematous changes seen on CT chest done in 2016 and 2022.  He did have PFTs that were unreliable for conclusion due to inadequate performance. Now on chronic o2 since 2022. Clinically no respiratory complaints, but need repeat PFTs if new symp arise.

## 2025-01-02 NOTE — ASSESSMENT & PLAN NOTE
Multifactorial   -had been on supplemental oxygen since admission 12/22, saturation well today without oxygen. Still uses 2.5 L O2 at home with rest and exertion, but never out of the house despite having portable.

## 2025-01-02 NOTE — ASSESSMENT & PLAN NOTE
-seen on echo, ?Cardiac versus ?pulmonary. Had been stable clinically and declined repeat testing and seeing cardiology before

## 2025-01-02 NOTE — PATIENT INSTRUCTIONS
information.           A Healthy Heart: Care Instructions  Overview     Coronary artery disease, also called heart disease, occurs when a substance called plaque builds up in the vessels that supply oxygen-rich blood to your heart muscle. This can narrow the blood vessels and reduce blood flow. A heart attack happens when blood flow is completely blocked. A high-fat diet, smoking, and other factors increase the risk of heart disease.  Your doctor has found that you have a chance of having heart disease. A heart-healthy lifestyle can help keep your heart healthy and prevent heart disease. This lifestyle includes eating healthy, being active, staying at a weight that's healthy for you, and not smoking or using tobacco. It also includes taking medicines as directed, managing other health conditions, and trying to get a healthy amount of sleep.  Follow-up care is a key part of your treatment and safety. Be sure to make and go to all appointments, and call your doctor if you are having problems. It's also a good idea to know your test results and keep a list of the medicines you take.  How can you care for yourself at home?  Diet    Use less salt when you cook and eat. This helps lower your blood pressure. Taste food before salting. Add only a little salt when you think you need it. With time, your taste buds will adjust to less salt.     Eat fewer snack items, fast foods, canned soups, and other high-salt, high-fat, processed foods.     Read food labels and try to avoid saturated and trans fats. They increase your risk of heart disease by raising cholesterol levels.     Limit the amount of solid fat--butter, margarine, and shortening--you eat. Use olive, peanut, or canola oil when you cook. Bake, broil, and steam foods instead of frying them.     Eat a variety of fruit and vegetables every day. Dark green, deep orange, red, or yellow fruits and vegetables are especially good for you. Examples include spinach, carrots,

## 2025-01-02 NOTE — ASSESSMENT & PLAN NOTE
-baseline GFR 40-50s.   -He is status post nephrectomy, unclear why  -established with dr Sandhu  -microalbuminuria 809 08/2024, repeat today. On jardiance

## 2025-01-03 LAB
CREAT UR-MCNC: 67.5 MG/DL (ref 39–259)
MICROALBUMIN UR DL<=1MG/L-MCNC: 112 MG/DL
MICROALBUMIN/CREAT UR: 1659.3 MG/G (ref 0–30)

## 2025-01-06 DIAGNOSIS — R80.1 PERSISTENT PROTEINURIA: Chronic | ICD-10-CM

## 2025-01-06 DIAGNOSIS — N18.31 STAGE 3A CHRONIC KIDNEY DISEASE (HCC): ICD-10-CM

## 2025-01-06 DIAGNOSIS — I50.32 CHRONIC HEART FAILURE WITH PRESERVED EJECTION FRACTION (HFPEF) (HCC): ICD-10-CM

## 2025-01-06 DIAGNOSIS — E66.9 TYPE 2 DIABETES MELLITUS WITH OBESITY (HCC): ICD-10-CM

## 2025-01-06 DIAGNOSIS — E11.69 TYPE 2 DIABETES MELLITUS WITH OBESITY (HCC): ICD-10-CM

## 2025-03-10 DIAGNOSIS — N40.1 BENIGN NON-NODULAR PROSTATIC HYPERPLASIA WITH LOWER URINARY TRACT SYMPTOMS: Chronic | ICD-10-CM

## 2025-03-10 NOTE — TELEPHONE ENCOUNTER
Refill req from Pharm- [facility] [id number] - [place], [place] - [address] - P [phone] - F [phone]       tamsulosin (FLOMAX) 0.4 MG capsule [[id number]]

## 2025-03-11 RX ORDER — TAMSULOSIN HYDROCHLORIDE 0.4 MG/1
0.4 CAPSULE ORAL DAILY
Qty: 90 CAPSULE | Refills: 1 | Status: SHIPPED | OUTPATIENT
Start: 2025-03-11

## 2025-04-24 RX ORDER — LEVOTHYROXINE SODIUM 25 UG/1
25 TABLET ORAL DAILY
Qty: 90 TABLET | Refills: 1 | Status: SHIPPED | OUTPATIENT
Start: 2025-04-24

## 2025-05-22 ENCOUNTER — TELEPHONE (OUTPATIENT)
Dept: INTERNAL MEDICINE CLINIC | Age: 89
End: 2025-05-22

## 2025-05-22 NOTE — TELEPHONE ENCOUNTER
Pts son Haresh is asking if the pt needs to do any blood work before his upcoming appointment on 5/29/25?    [phone]

## 2025-05-22 NOTE — TELEPHONE ENCOUNTER
----- Message from Aroldo SMITH sent at 5/21/2025  3:45 PM EDT -----  Regarding: ECC Message to Provider  ECC Message to Provider    Relationship to Patient: Covered Entity, Son, Haresh     Additional Information, called in because they would like to know if the patient needs to do blood work before his appt on 5/29/2025  -----------------------------------------------------------------------------------------------------------------------    Call Back Information: OK to leave message on voicemail  Preferred Call Back Number: Phone    [phone]

## 2025-05-27 DIAGNOSIS — E03.9 HYPOTHYROIDISM, UNSPECIFIED TYPE: ICD-10-CM

## 2025-05-27 DIAGNOSIS — E11.69 TYPE 2 DIABETES MELLITUS WITH OBESITY (HCC): ICD-10-CM

## 2025-05-27 DIAGNOSIS — E66.9 TYPE 2 DIABETES MELLITUS WITH OBESITY (HCC): ICD-10-CM

## 2025-05-28 LAB
ALBUMIN SERPL-MCNC: 4.3 G/DL (ref 3.4–5)
ALBUMIN/GLOB SERPL: 1.2 {RATIO} (ref 1.1–2.2)
ALP SERPL-CCNC: 80 U/L (ref 40–129)
ALT SERPL-CCNC: 15 U/L (ref 10–40)
ANION GAP SERPL CALCULATED.3IONS-SCNC: 10 MMOL/L (ref 3–16)
AST SERPL-CCNC: 17 U/L (ref 15–37)
BASOPHILS # BLD: 0.1 K/UL (ref 0–0.2)
BASOPHILS NFR BLD: 1.2 %
BILIRUB SERPL-MCNC: 0.4 MG/DL (ref 0–1)
BUN SERPL-MCNC: 23 MG/DL (ref 7–20)
CALCIUM SERPL-MCNC: 10.7 MG/DL (ref 8.3–10.6)
CHLORIDE SERPL-SCNC: 104 MMOL/L (ref 99–110)
CO2 SERPL-SCNC: 30 MMOL/L (ref 21–32)
CREAT SERPL-MCNC: 1.8 MG/DL (ref 0.8–1.3)
DEPRECATED RDW RBC AUTO: 16.3 % (ref 12.4–15.4)
EOSINOPHIL # BLD: 0.2 K/UL (ref 0–0.6)
EOSINOPHIL NFR BLD: 4.7 %
EST. AVERAGE GLUCOSE BLD GHB EST-MCNC: 142.7 MG/DL
GFR SERPLBLD CREATININE-BSD FMLA CKD-EPI: 35 ML/MIN/{1.73_M2}
GLUCOSE SERPL-MCNC: 98 MG/DL (ref 70–99)
HBA1C MFR BLD: 6.6 %
HCT VFR BLD AUTO: 45.9 % (ref 40.5–52.5)
HGB BLD-MCNC: 14.6 G/DL (ref 13.5–17.5)
LYMPHOCYTES # BLD: 1.5 K/UL (ref 1–5.1)
LYMPHOCYTES NFR BLD: 31.7 %
MCH RBC QN AUTO: 29.5 PG (ref 26–34)
MCHC RBC AUTO-ENTMCNC: 31.8 G/DL (ref 31–36)
MCV RBC AUTO: 92.7 FL (ref 80–100)
MONOCYTES # BLD: 0.2 K/UL (ref 0–1.3)
MONOCYTES NFR BLD: 4.7 %
NEUTROPHILS # BLD: 2.7 K/UL (ref 1.7–7.7)
NEUTROPHILS NFR BLD: 57.7 %
PLATELET # BLD AUTO: 208 K/UL (ref 135–450)
PMV BLD AUTO: 9.7 FL (ref 5–10.5)
POTASSIUM SERPL-SCNC: 4.4 MMOL/L (ref 3.5–5.1)
PROT SERPL-MCNC: 7.9 G/DL (ref 6.4–8.2)
RBC # BLD AUTO: 4.95 M/UL (ref 4.2–5.9)
SODIUM SERPL-SCNC: 144 MMOL/L (ref 136–145)
TSH SERPL DL<=0.005 MIU/L-ACNC: 3.43 UIU/ML (ref 0.27–4.2)
WBC # BLD AUTO: 4.6 K/UL (ref 4–11)

## 2025-05-29 ENCOUNTER — OFFICE VISIT (OUTPATIENT)
Dept: INTERNAL MEDICINE CLINIC | Age: 89
End: 2025-05-29

## 2025-05-29 VITALS
BODY MASS INDEX: 38.29 KG/M2 | SYSTOLIC BLOOD PRESSURE: 130 MMHG | TEMPERATURE: 97.5 F | DIASTOLIC BLOOD PRESSURE: 80 MMHG | WEIGHT: 237.2 LBS

## 2025-05-29 DIAGNOSIS — R80.1 PERSISTENT PROTEINURIA: Chronic | ICD-10-CM

## 2025-05-29 DIAGNOSIS — E66.9 TYPE 2 DIABETES MELLITUS WITH OBESITY (HCC): ICD-10-CM

## 2025-05-29 DIAGNOSIS — I10 PRIMARY HYPERTENSION: ICD-10-CM

## 2025-05-29 DIAGNOSIS — E11.69 TYPE 2 DIABETES MELLITUS WITH OBESITY (HCC): ICD-10-CM

## 2025-05-29 DIAGNOSIS — N18.31 STAGE 3A CHRONIC KIDNEY DISEASE (HCC): Primary | Chronic | ICD-10-CM

## 2025-05-29 DIAGNOSIS — I50.32 CHRONIC HEART FAILURE WITH PRESERVED EJECTION FRACTION (HFPEF) (HCC): ICD-10-CM

## 2025-05-29 SDOH — ECONOMIC STABILITY: FOOD INSECURITY: WITHIN THE PAST 12 MONTHS, YOU WORRIED THAT YOUR FOOD WOULD RUN OUT BEFORE YOU GOT MONEY TO BUY MORE.: NEVER TRUE

## 2025-05-29 SDOH — ECONOMIC STABILITY: FOOD INSECURITY: WITHIN THE PAST 12 MONTHS, THE FOOD YOU BOUGHT JUST DIDN'T LAST AND YOU DIDN'T HAVE MONEY TO GET MORE.: NEVER TRUE

## 2025-05-29 ASSESSMENT — ENCOUNTER SYMPTOMS: SHORTNESS OF BREATH: 0

## 2025-05-29 NOTE — ASSESSMENT & PLAN NOTE
-baseline GFR down to 38 from prior base 40-50s.   -He is status post nephrectomy, unclear why  - Will send him to dr Sandhu  -microalbuminuria 1.6g 01/2025, repeat today. On jardiance

## 2025-05-29 NOTE — ASSESSMENT & PLAN NOTE
-Significant proteinuria 1.6 g 1/25, we increase Jardiance dose to 25 mg, repeat urine protein now  -Given kidney function decline and proteinuria will refer to nephrology

## 2025-05-29 NOTE — ASSESSMENT & PLAN NOTE
A1c down to 6.6% 05/25, Known CKD stage III with proteinuria.   -continue Jardiance 25 mg (dose increased last visit, will repeat urine protein)  - BP fair.  Not on ACE/ARB  - LDL improved sig after changing simvastatin 20 mg to crestor 10 mg  - he self stopped ASA   - low carb diet and regular exercise, regular foot care  - microalbuminuria 1.6g 01/2025, repeat now, will send to nephrology  - uptodate w/ PSV23 vaccine.  - Follow up in 4 months

## 2025-05-29 NOTE — ASSESSMENT & PLAN NOTE
Stable  -Echo 12/22- Mild LVH, EF 55-60%, grade 1 diastolic dysfunction, systolic septal flattening consistent with right ventricular pressure, RA and RV dilated, moderate to severe TR, elevated pulmonary artery pressure 90 mmHg  -Beta-blocker stopped due to junctional rhythm  -Continue torsemide, tolerating jardiance (for DM, HF and proteinuria) well    -continue crestor    -still had not established with cards

## 2025-05-29 NOTE — PROGRESS NOTES
[facility]  Follow up visit   2025    Rakan Larson (:  5/3/1934) is a 89 y.o. 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   -continue Jardiance 25 mg (dose increased last visit, will repeat urine protein)  - BP fair.  Not on ACE/ARB  - LDL improved sig after changing simvastatin 20 mg to crestor 10 mg  - he self stopped ASA   - low carb diet and regular exercise, regular foot care  - microalbuminuria 1.6g 2025, repeat now, will send to nephrology  - uptodate w/ PSV23 vaccine.  - Follow up in 4 months    Proteinuria  -Significant proteinuria 1.6 g , we increase Jardiance dose to 25 mg, repeat urine protein now  -Given kidney function decline and proteinuria will refer to nephrology    Primary hypertension  Well controlled  -continue amlodipine 5 mg     Chronic heart failure with preserved ejection fraction (HFpEF) ([facility])  Stable  -Echo - Mild LVH, EF 55-60%, grade 1 diastolic dysfunction, systolic septal flattening consistent with right ventricular pressure, RA and RV dilated, moderate to severe TR, elevated pulmonary artery pressure 90 mmHg  -Beta-blocker stopped due to junctional rhythm  -Continue torsemide, tolerating jardiance (for DM, HF and proteinuria) well    -continue crestor    -still had not established with cards    Chronic kidney disease, stage III (moderate)  -baseline GFR down to 38 from prior base 40-50s.   -He is status post nephrectomy, unclear why  - Will send him to dr Sandhu  -microalbuminuria 1.6g 2025, repeat today. On jardiance      Orders Placed This Encounter   Procedures    Albumin/Creatinine Ratio, Urine    Lipid Panel    Hemoglobin A1C    Comprehensive Metabolic Panel    CBC with Auto Differential

## 2025-05-30 LAB
CREAT UR-MCNC: 70.7 MG/DL (ref 39–259)
MICROALBUMIN UR DL<=1MG/L-MCNC: 61.4 MG/DL
MICROALBUMIN/CREAT UR: 868.5 MG/G (ref 0–30)

## 2025-06-18 DIAGNOSIS — E78.5 HYPERLIPIDEMIA, UNSPECIFIED HYPERLIPIDEMIA TYPE: Chronic | ICD-10-CM

## 2025-06-18 RX ORDER — ROSUVASTATIN CALCIUM 10 MG/1
10 TABLET, COATED ORAL NIGHTLY
Qty: 90 TABLET | Refills: 2 | Status: SHIPPED | OUTPATIENT
Start: 2025-06-18

## 2025-06-18 NOTE — TELEPHONE ENCOUNTER
Last appointment: 5/29/2025  Next appointment: 1/7/2026        Last refill: (12/16/2024) by Pardeep Cervantes MD

## 2025-06-24 RX ORDER — TORSEMIDE 100 MG/1
100 TABLET ORAL
Qty: 90 TABLET | Refills: 1 | Status: SHIPPED | OUTPATIENT
Start: 2025-06-24

## 2025-06-24 NOTE — TELEPHONE ENCOUNTER
Last appointment: 5/29/2025  Next appointment: 1/7/2026      Last refill: o (12/24/2024) by Pardeep Cervantes MD

## 2025-07-02 DIAGNOSIS — I50.32 CHRONIC HEART FAILURE WITH PRESERVED EJECTION FRACTION (HFPEF) (HCC): ICD-10-CM

## 2025-07-02 DIAGNOSIS — N18.31 STAGE 3A CHRONIC KIDNEY DISEASE (HCC): ICD-10-CM

## 2025-07-02 DIAGNOSIS — E66.9 TYPE 2 DIABETES MELLITUS WITH OBESITY (HCC): ICD-10-CM

## 2025-07-02 DIAGNOSIS — E11.69 TYPE 2 DIABETES MELLITUS WITH OBESITY (HCC): ICD-10-CM

## 2025-07-02 DIAGNOSIS — R80.1 PERSISTENT PROTEINURIA: Chronic | ICD-10-CM

## 2025-07-02 DIAGNOSIS — M1A.9XX0 CHRONIC GOUT WITHOUT TOPHUS, UNSPECIFIED CAUSE, UNSPECIFIED SITE: Chronic | ICD-10-CM

## 2025-07-02 RX ORDER — ALLOPURINOL 100 MG/1
TABLET ORAL
Qty: 135 TABLET | Refills: 1 | Status: SHIPPED | OUTPATIENT
Start: 2025-07-02

## 2025-07-02 RX ORDER — EMPAGLIFLOZIN 25 MG/1
25 TABLET, FILM COATED ORAL DAILY
Qty: 90 TABLET | Refills: 1 | Status: SHIPPED | OUTPATIENT
Start: 2025-07-02

## 2025-07-09 NOTE — TELEPHONE ENCOUNTER
Pts son is requesting a refill on medication below for the pt:      amLODIPine (NORVASC) 5 MG tablet [[id number]]     [facility] [id number] - [place] [address] - P [phone] - F [phone]

## 2025-07-10 RX ORDER — AMLODIPINE BESYLATE 5 MG/1
5 TABLET ORAL DAILY
Qty: 90 TABLET | Refills: 1 | Status: SHIPPED | OUTPATIENT
Start: 2025-07-10

## (undated) DEVICE — FORCEPS BX L240CM DIA2.4MM L NDL RAD JAW 4 133340